# Patient Record
Sex: FEMALE | Race: BLACK OR AFRICAN AMERICAN | NOT HISPANIC OR LATINO
[De-identification: names, ages, dates, MRNs, and addresses within clinical notes are randomized per-mention and may not be internally consistent; named-entity substitution may affect disease eponyms.]

---

## 2017-01-05 ENCOUNTER — APPOINTMENT (OUTPATIENT)
Dept: GASTROENTEROLOGY | Facility: CLINIC | Age: 32
End: 2017-01-05

## 2017-01-05 VITALS
RESPIRATION RATE: 15 BRPM | BODY MASS INDEX: 25.49 KG/M2 | HEART RATE: 77 BPM | TEMPERATURE: 98 F | OXYGEN SATURATION: 98 % | WEIGHT: 135 LBS | HEIGHT: 61 IN | DIASTOLIC BLOOD PRESSURE: 68 MMHG | SYSTOLIC BLOOD PRESSURE: 110 MMHG

## 2017-01-05 DIAGNOSIS — Z80.0 FAMILY HISTORY OF MALIGNANT NEOPLASM OF DIGESTIVE ORGANS: ICD-10-CM

## 2017-01-08 PROBLEM — Z80.0 FAMILY HISTORY OF MALIGNANT NEOPLASM OF STOMACH: Status: ACTIVE | Noted: 2017-01-08

## 2017-02-08 ENCOUNTER — APPOINTMENT (OUTPATIENT)
Dept: GASTROENTEROLOGY | Facility: HOSPITAL | Age: 32
End: 2017-02-08

## 2017-03-09 ENCOUNTER — APPOINTMENT (OUTPATIENT)
Dept: FAMILY MEDICINE | Facility: CLINIC | Age: 32
End: 2017-03-09

## 2017-07-24 ENCOUNTER — APPOINTMENT (OUTPATIENT)
Dept: FAMILY MEDICINE | Facility: CLINIC | Age: 32
End: 2017-07-24

## 2017-09-11 ENCOUNTER — APPOINTMENT (OUTPATIENT)
Dept: ULTRASOUND IMAGING | Facility: HOSPITAL | Age: 32
End: 2017-09-11
Payer: COMMERCIAL

## 2017-09-11 ENCOUNTER — OUTPATIENT (OUTPATIENT)
Dept: OUTPATIENT SERVICES | Facility: HOSPITAL | Age: 32
LOS: 1 days | End: 2017-09-11
Payer: COMMERCIAL

## 2017-09-11 PROCEDURE — 76536 US EXAM OF HEAD AND NECK: CPT

## 2017-09-11 PROCEDURE — 76536 US EXAM OF HEAD AND NECK: CPT | Mod: 26

## 2017-09-11 PROCEDURE — 76700 US EXAM ABDOM COMPLETE: CPT | Mod: 26

## 2017-09-11 PROCEDURE — 76700 US EXAM ABDOM COMPLETE: CPT

## 2017-10-20 ENCOUNTER — EMERGENCY (EMERGENCY)
Facility: HOSPITAL | Age: 32
LOS: 1 days | Discharge: HOME | End: 2017-10-20
Admitting: EMERGENCY MEDICINE
Payer: COMMERCIAL

## 2017-10-20 PROCEDURE — 73562 X-RAY EXAM OF KNEE 3: CPT | Mod: 26,RT

## 2017-10-20 PROCEDURE — 99284 EMERGENCY DEPT VISIT MOD MDM: CPT

## 2017-10-21 PROCEDURE — 96375 TX/PRO/DX INJ NEW DRUG ADDON: CPT

## 2017-10-21 PROCEDURE — 81025 URINE PREGNANCY TEST: CPT

## 2017-10-21 PROCEDURE — 85027 COMPLETE CBC AUTOMATED: CPT

## 2017-10-21 PROCEDURE — 81003 URINALYSIS AUTO W/O SCOPE: CPT

## 2017-10-21 PROCEDURE — 96374 THER/PROPH/DIAG INJ IV PUSH: CPT

## 2017-10-21 PROCEDURE — 80053 COMPREHEN METABOLIC PANEL: CPT

## 2017-10-21 PROCEDURE — 99284 EMERGENCY DEPT VISIT MOD MDM: CPT | Mod: 25

## 2017-10-21 PROCEDURE — 83690 ASSAY OF LIPASE: CPT

## 2017-10-21 PROCEDURE — 73562 X-RAY EXAM OF KNEE 3: CPT

## 2017-11-17 ENCOUNTER — APPOINTMENT (OUTPATIENT)
Dept: FAMILY MEDICINE | Facility: CLINIC | Age: 32
End: 2017-11-17

## 2017-11-18 ENCOUNTER — EMERGENCY (EMERGENCY)
Facility: HOSPITAL | Age: 32
LOS: 1 days | Discharge: HOME | End: 2017-11-18
Admitting: EMERGENCY MEDICINE
Payer: COMMERCIAL

## 2017-11-18 PROCEDURE — 99283 EMERGENCY DEPT VISIT LOW MDM: CPT | Mod: 25

## 2017-11-18 PROCEDURE — 96372 THER/PROPH/DIAG INJ SC/IM: CPT | Mod: XU

## 2017-11-18 PROCEDURE — 56420 I&D BARTHOLINS GLAND ABSCESS: CPT

## 2017-11-18 PROCEDURE — 99284 EMERGENCY DEPT VISIT MOD MDM: CPT | Mod: 25

## 2017-11-18 PROCEDURE — 56420 I&D BARTHOLINS GLAND ABSCESS: CPT | Mod: RT

## 2017-12-20 ENCOUNTER — EMERGENCY (EMERGENCY)
Facility: HOSPITAL | Age: 32
LOS: 1 days | Discharge: HOME | End: 2017-12-20
Attending: EMERGENCY MEDICINE | Admitting: EMERGENCY MEDICINE
Payer: COMMERCIAL

## 2017-12-20 VITALS
DIASTOLIC BLOOD PRESSURE: 77 MMHG | RESPIRATION RATE: 16 BRPM | TEMPERATURE: 100 F | HEART RATE: 78 BPM | SYSTOLIC BLOOD PRESSURE: 113 MMHG | OXYGEN SATURATION: 97 %

## 2017-12-20 VITALS
OXYGEN SATURATION: 99 % | DIASTOLIC BLOOD PRESSURE: 42 MMHG | HEART RATE: 87 BPM | TEMPERATURE: 98 F | SYSTOLIC BLOOD PRESSURE: 126 MMHG | RESPIRATION RATE: 16 BRPM | HEIGHT: 61 IN | WEIGHT: 130.07 LBS

## 2017-12-20 DIAGNOSIS — N75.0 CYST OF BARTHOLIN'S GLAND: Chronic | ICD-10-CM

## 2017-12-20 PROCEDURE — 99283 EMERGENCY DEPT VISIT LOW MDM: CPT | Mod: 25

## 2017-12-20 PROCEDURE — 56420 I&D BARTHOLINS GLAND ABSCESS: CPT

## 2017-12-20 PROCEDURE — 87070 CULTURE OTHR SPECIMN AEROBIC: CPT

## 2017-12-20 PROCEDURE — 87205 SMEAR GRAM STAIN: CPT

## 2017-12-20 RX ORDER — CEPHALEXIN 500 MG
1 CAPSULE ORAL
Qty: 12 | Refills: 0 | OUTPATIENT
Start: 2017-12-20 | End: 2017-12-25

## 2017-12-20 RX ORDER — ACETAMINOPHEN 500 MG
650 TABLET ORAL ONCE
Qty: 0 | Refills: 0 | Status: COMPLETED | OUTPATIENT
Start: 2017-12-20 | End: 2017-12-20

## 2017-12-20 RX ORDER — OXYCODONE HYDROCHLORIDE 5 MG/1
5 TABLET ORAL ONCE
Qty: 0 | Refills: 0 | Status: DISCONTINUED | OUTPATIENT
Start: 2017-12-20 | End: 2017-12-20

## 2017-12-20 RX ORDER — CEPHALEXIN 500 MG
500 CAPSULE ORAL ONCE
Qty: 0 | Refills: 0 | Status: COMPLETED | OUTPATIENT
Start: 2017-12-20 | End: 2017-12-20

## 2017-12-20 RX ADMIN — OXYCODONE HYDROCHLORIDE 5 MILLIGRAM(S): 5 TABLET ORAL at 11:15

## 2017-12-20 RX ADMIN — Medication 650 MILLIGRAM(S): at 11:16

## 2017-12-20 RX ADMIN — Medication 650 MILLIGRAM(S): at 11:45

## 2017-12-20 RX ADMIN — Medication 500 MILLIGRAM(S): at 12:43

## 2017-12-20 RX ADMIN — OXYCODONE HYDROCHLORIDE 5 MILLIGRAM(S): 5 TABLET ORAL at 11:45

## 2017-12-20 NOTE — ED PROVIDER NOTE - PMH
Anxiety    PCOS (polycystic ovarian syndrome) Anxiety    H. pylori infection    PCOS (polycystic ovarian syndrome)

## 2017-12-20 NOTE — ED PROVIDER NOTE - CHPI ED SYMPTOMS NEG
no pain/no abdominal pain/no back pain/no vaginal discharge/no vomiting/no nausea/no fever/no chills/no discharge/no dysuria

## 2017-12-20 NOTE — ED PROVIDER NOTE - PROGRESS NOTE DETAILS
Copious purulent fluid expressed from cyst; pt with near-immediate relief of pain; packing placed, pt recommended to return to ED in 48 hr or see her OB in same time period.  Abx Rx provided.  Strict return precautions provided.  --BMM

## 2017-12-20 NOTE — ED PROVIDER NOTE - MEDICAL DECISION MAKING DETAILS
Patient with Bartholin cyst was drained in ED, no complications. needs to undergo marsupialisation bytaisha LARKIN. discharged on antibiotics. Patient with Bartholin cyst was drained in ED, no complications. needs to undergo outpt marsupialization by OBGYN for definitive mgmt. discharged on antibiotics.  Will return in 48 hr for packing removal/wound check, wound care instructions provided.  --BMM

## 2017-12-20 NOTE — ED ADULT NURSE REASSESSMENT NOTE - NS ED NURSE REASSESS COMMENT FT1
Pt reports minor relief of pain s/p bedside lancing of abscess. Female RN bedside for procedure performed by Julián GREENE.

## 2017-12-20 NOTE — ED PROVIDER NOTE - CHIEF COMPLAINT
The patient is a 32y Female complaining of The patient is a 32y Female complaining of right labial pain

## 2017-12-20 NOTE — ED ADULT NURSE NOTE - OBJECTIVE STATEMENT
33 y/o female A&Ox4, PMH anxiety, PCOS, presents to ED with c/o labial cyst accompanied by burning pain rated 10/10. As per pt, area was lanced last month. Pt had had tge cyst for 8 years but pain only recently began prior to first lacing last month. Pt states she took out packing and fluid reaccumulated immediately. Denies vaginal discharge/bleeding, denies burning with urination. Pt is otherwise alert, ambulatory, and well appearing. No other complaints at this time.

## 2017-12-20 NOTE — ED ADULT NURSE REASSESSMENT NOTE - NS ED NURSE REASSESS COMMENT FT1
Pt received from Timothy RN. Bedside vaginal exam performed by Julián GREENE, female RN bedside as chaperone. Pt pending lacing of abscess

## 2017-12-20 NOTE — ED PROVIDER NOTE - OBJECTIVE STATEMENT
32 y F h/o gastroesophageal reflux disease on triple therapy, seen 1 month ago at outside ED for R bartholin gland cyst 32 y F h/o gastroesophageal reflux disease on triple therapy, seen 1 month ago at outside ED for R bartholin gland cyst.  She has had a Bartholin cyst for the past few years, but never experienced pain/cyst infections 32 y F h/o gastroesophageal reflux disease on triple therapy, seen 1 month ago at outside ED for R bartholin gland cyst, s/p I&D at that time.  Was seen by outpt OB who recommended marsupialization.  Subjective f/c but has not taken her temperature.  No dysuria but pain with sitting.  No rectal pain.  No vaginal d/c.

## 2017-12-25 LAB
CULTURE RESULTS: SIGNIFICANT CHANGE UP
SPECIMEN SOURCE: SIGNIFICANT CHANGE UP

## 2018-01-16 ENCOUNTER — EMERGENCY (EMERGENCY)
Facility: HOSPITAL | Age: 33
LOS: 1 days | Discharge: HOME | End: 2018-01-16
Admitting: EMERGENCY MEDICINE
Payer: COMMERCIAL

## 2018-01-16 DIAGNOSIS — N75.0 CYST OF BARTHOLIN'S GLAND: Chronic | ICD-10-CM

## 2018-01-16 PROCEDURE — 99284 EMERGENCY DEPT VISIT MOD MDM: CPT | Mod: 25

## 2018-01-16 PROCEDURE — 81025 URINE PREGNANCY TEST: CPT

## 2018-01-16 PROCEDURE — 99284 EMERGENCY DEPT VISIT MOD MDM: CPT

## 2018-01-16 PROCEDURE — 87205 SMEAR GRAM STAIN: CPT

## 2018-01-16 PROCEDURE — 56420 I&D BARTHOLINS GLAND ABSCESS: CPT

## 2018-01-16 PROCEDURE — 56405 I&D VULVA/PERINEAL ABSCESS: CPT

## 2018-01-16 PROCEDURE — 87070 CULTURE OTHR SPECIMN AEROBIC: CPT

## 2018-03-18 ENCOUNTER — INPATIENT (INPATIENT)
Facility: HOSPITAL | Age: 33
LOS: 0 days | Discharge: HOME | DRG: 747 | End: 2018-03-19
Attending: OBSTETRICS & GYNECOLOGY | Admitting: OBSTETRICS & GYNECOLOGY
Payer: COMMERCIAL

## 2018-03-18 DIAGNOSIS — F17.210 NICOTINE DEPENDENCE, CIGARETTES, UNCOMPLICATED: ICD-10-CM

## 2018-03-18 DIAGNOSIS — N75.0 CYST OF BARTHOLIN'S GLAND: Chronic | ICD-10-CM

## 2018-03-18 DIAGNOSIS — N75.1 ABSCESS OF BARTHOLIN'S GLAND: ICD-10-CM

## 2018-03-18 DIAGNOSIS — Z91.040 LATEX ALLERGY STATUS: ICD-10-CM

## 2018-03-18 PROCEDURE — 56440 MRSPLZATN BRTHLNS GLND CST: CPT

## 2018-03-19 PROCEDURE — 96375 TX/PRO/DX INJ NEW DRUG ADDON: CPT

## 2018-03-19 PROCEDURE — C1889: CPT

## 2018-03-19 PROCEDURE — 99285 EMERGENCY DEPT VISIT HI MDM: CPT | Mod: 25

## 2018-03-19 PROCEDURE — 87075 CULTR BACTERIA EXCEPT BLOOD: CPT

## 2018-03-19 PROCEDURE — 87205 SMEAR GRAM STAIN: CPT

## 2018-03-19 PROCEDURE — 87070 CULTURE OTHR SPECIMN AEROBIC: CPT

## 2018-03-19 PROCEDURE — 85027 COMPLETE CBC AUTOMATED: CPT

## 2018-03-19 PROCEDURE — 85730 THROMBOPLASTIN TIME PARTIAL: CPT

## 2018-03-19 PROCEDURE — 85610 PROTHROMBIN TIME: CPT

## 2018-03-19 PROCEDURE — 96361 HYDRATE IV INFUSION ADD-ON: CPT

## 2018-03-19 PROCEDURE — 96365 THER/PROPH/DIAG IV INF INIT: CPT

## 2018-03-19 PROCEDURE — 80048 BASIC METABOLIC PNL TOTAL CA: CPT

## 2018-03-19 PROCEDURE — 86850 RBC ANTIBODY SCREEN: CPT

## 2018-03-19 PROCEDURE — 84702 CHORIONIC GONADOTROPIN TEST: CPT

## 2018-03-19 PROCEDURE — 86900 BLOOD TYPING SEROLOGIC ABO: CPT

## 2018-11-16 ENCOUNTER — EMERGENCY (EMERGENCY)
Facility: HOSPITAL | Age: 33
LOS: 1 days | Discharge: HOME | End: 2018-11-16
Attending: INTERNAL MEDICINE | Admitting: INTERNAL MEDICINE
Payer: COMMERCIAL

## 2018-11-16 VITALS
RESPIRATION RATE: 18 BRPM | DIASTOLIC BLOOD PRESSURE: 77 MMHG | HEART RATE: 84 BPM | TEMPERATURE: 99 F | OXYGEN SATURATION: 98 % | HEIGHT: 61 IN | SYSTOLIC BLOOD PRESSURE: 135 MMHG | WEIGHT: 158.73 LBS

## 2018-11-16 DIAGNOSIS — N75.0 CYST OF BARTHOLIN'S GLAND: Chronic | ICD-10-CM

## 2018-11-16 PROBLEM — A04.8 OTHER SPECIFIED BACTERIAL INTESTINAL INFECTIONS: Chronic | Status: ACTIVE | Noted: 2017-12-20

## 2018-11-16 PROCEDURE — 81025 URINE PREGNANCY TEST: CPT

## 2018-11-16 PROCEDURE — 99284 EMERGENCY DEPT VISIT MOD MDM: CPT | Mod: 25

## 2018-11-16 PROCEDURE — 72100 X-RAY EXAM L-S SPINE 2/3 VWS: CPT

## 2018-11-16 PROCEDURE — 72100 X-RAY EXAM L-S SPINE 2/3 VWS: CPT | Mod: 26

## 2018-11-16 PROCEDURE — 70450 CT HEAD/BRAIN W/O DYE: CPT | Mod: 26

## 2018-11-16 PROCEDURE — 99284 EMERGENCY DEPT VISIT MOD MDM: CPT

## 2018-11-16 PROCEDURE — 70450 CT HEAD/BRAIN W/O DYE: CPT

## 2018-11-16 RX ORDER — LIDOCAINE 4 G/100G
1 CREAM TOPICAL
Qty: 5 | Refills: 0
Start: 2018-11-16 | End: 2018-11-20

## 2018-11-16 RX ORDER — OXYCODONE AND ACETAMINOPHEN 5; 325 MG/1; MG/1
1 TABLET ORAL ONCE
Qty: 0 | Refills: 0 | Status: DISCONTINUED | OUTPATIENT
Start: 2018-11-16 | End: 2018-11-16

## 2018-11-16 RX ORDER — LIDOCAINE 4 G/100G
1 CREAM TOPICAL ONCE
Qty: 0 | Refills: 0 | Status: COMPLETED | OUTPATIENT
Start: 2018-11-16 | End: 2018-11-16

## 2018-11-16 RX ORDER — LIDOCAINE 4 G/100G
1 CREAM TOPICAL
Qty: 5 | Refills: 0 | OUTPATIENT
Start: 2018-11-16 | End: 2018-11-20

## 2018-11-16 RX ADMIN — OXYCODONE AND ACETAMINOPHEN 1 TABLET(S): 5; 325 TABLET ORAL at 13:05

## 2018-11-16 RX ADMIN — LIDOCAINE 1 PATCH: 4 CREAM TOPICAL at 13:04

## 2018-11-16 NOTE — ED PROVIDER NOTE - PROGRESS NOTE DETAILS
xray and ct reviewed. Pt feeling better. Pt ambulating. Pt neurovascular intact. Pt stable for discharge.

## 2018-11-16 NOTE — ED PROVIDER NOTE - PHYSICAL EXAMINATION
pt ambulating   spine- tenderness to lumbar spine and right sided paraspinal lumbar spine   cervical- no bony tenderness   head- positive hematoma to left sided of forehead
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
## 2018-11-16 NOTE — ED ADULT NURSE NOTE - CHPI ED NUR SYMPTOMS NEG
no seizure/no weakness/no chest wall tenderness/no abrasion/no chest pain/no loss of consciousness/no blurred vision
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
## 2018-11-16 NOTE — ED ADULT NURSE NOTE - NSIMPLEMENTINTERV_GEN_ALL_ED
Implemented All Universal Safety Interventions:  [place] to call system. Call bell, personal items and telephone within reach. Instruct patient to call for assistance. Room bathroom lighting operational. Non-slip footwear when patient is off stretcher. Physically safe environment: no spills, clutter or unnecessary equipment. Stretcher in lowest position, wheels locked, appropriate side rails in place.

## 2018-12-11 NOTE — ED ADULT NURSE NOTE - PATIENT PROVIDED WITH THE SEVEN POINTS OF INFORMATION ABOUT HIV REQUIRED BY THE PUBLIC HEALTH LAW
Statement Selected Island Pedicle Flap-Requiring Vessel Identification Text: The defect edges were debeveled with a #15 scalpel blade.  Given the location of the defect, shape of the defect and the proximity to free margins an island pedicle advancement flap was deemed most appropriate.  Using a sterile surgical marker, an appropriate advancement flap was drawn, based on the axial vessel mentioned above, incorporating the defect, outlining the appropriate donor tissue and placing the expected incisions within the relaxed skin tension lines where possible.    The area thus outlined was incised deep to adipose tissue with a #15 scalpel blade.  The skin margins were undermined to an appropriate distance in all directions around the primary defect and laterally outward around the island pedicle utilizing iris scissors.  There was minimal undermining beneath the pedicle flap.

## 2019-04-12 ENCOUNTER — EMERGENCY (EMERGENCY)
Facility: HOSPITAL | Age: 34
LOS: 1 days | Discharge: HOME | End: 2019-04-12
Attending: EMERGENCY MEDICINE
Payer: COMMERCIAL

## 2019-04-12 VITALS
OXYGEN SATURATION: 99 % | RESPIRATION RATE: 20 BRPM | TEMPERATURE: 98 F | HEART RATE: 82 BPM | DIASTOLIC BLOOD PRESSURE: 66 MMHG | SYSTOLIC BLOOD PRESSURE: 114 MMHG

## 2019-04-12 VITALS
WEIGHT: 149.91 LBS | SYSTOLIC BLOOD PRESSURE: 112 MMHG | TEMPERATURE: 98 F | RESPIRATION RATE: 18 BRPM | HEIGHT: 61 IN | OXYGEN SATURATION: 96 % | DIASTOLIC BLOOD PRESSURE: 64 MMHG | HEART RATE: 88 BPM

## 2019-04-12 DIAGNOSIS — N75.0 CYST OF BARTHOLIN'S GLAND: Chronic | ICD-10-CM

## 2019-04-12 LAB
ALBUMIN SERPL ELPH-MCNC: 4.4 G/DL — SIGNIFICANT CHANGE UP (ref 3.3–5)
ALP SERPL-CCNC: 63 U/L — SIGNIFICANT CHANGE UP (ref 40–120)
ALT FLD-CCNC: 18 U/L — SIGNIFICANT CHANGE UP (ref 10–45)
ANION GAP SERPL CALC-SCNC: 10 MMOL/L — SIGNIFICANT CHANGE UP (ref 5–17)
AST SERPL-CCNC: 18 U/L — SIGNIFICANT CHANGE UP (ref 10–40)
BILIRUB SERPL-MCNC: 0.2 MG/DL — SIGNIFICANT CHANGE UP (ref 0.2–1.2)
BUN SERPL-MCNC: 19 MG/DL — SIGNIFICANT CHANGE UP (ref 7–23)
CALCIUM SERPL-MCNC: 9.8 MG/DL — SIGNIFICANT CHANGE UP (ref 8.4–10.5)
CHLORIDE SERPL-SCNC: 105 MMOL/L — SIGNIFICANT CHANGE UP (ref 96–108)
CO2 SERPL-SCNC: 25 MMOL/L — SIGNIFICANT CHANGE UP (ref 22–31)
CREAT SERPL-MCNC: 0.9 MG/DL — SIGNIFICANT CHANGE UP (ref 0.5–1.3)
D DIMER BLD IA.RAPID-MCNC: <150 NG/ML DDU — SIGNIFICANT CHANGE UP
GLUCOSE SERPL-MCNC: 85 MG/DL — SIGNIFICANT CHANGE UP (ref 70–99)
HCT VFR BLD CALC: 36.4 % — SIGNIFICANT CHANGE UP (ref 34.5–45)
HGB BLD-MCNC: 12.3 G/DL — SIGNIFICANT CHANGE UP (ref 11.5–15.5)
MCHC RBC-ENTMCNC: 31.3 PG — SIGNIFICANT CHANGE UP (ref 27–34)
MCHC RBC-ENTMCNC: 33.8 GM/DL — SIGNIFICANT CHANGE UP (ref 32–36)
MCV RBC AUTO: 92.6 FL — SIGNIFICANT CHANGE UP (ref 80–100)
PLATELET # BLD AUTO: 263 K/UL — SIGNIFICANT CHANGE UP (ref 150–400)
POTASSIUM SERPL-MCNC: 4.3 MMOL/L — SIGNIFICANT CHANGE UP (ref 3.5–5.3)
POTASSIUM SERPL-SCNC: 4.3 MMOL/L — SIGNIFICANT CHANGE UP (ref 3.5–5.3)
PROT SERPL-MCNC: 7.4 G/DL — SIGNIFICANT CHANGE UP (ref 6–8.3)
RBC # BLD: 3.93 M/UL — SIGNIFICANT CHANGE UP (ref 3.8–5.2)
RBC # FLD: 12.5 % — SIGNIFICANT CHANGE UP (ref 10.3–14.5)
SODIUM SERPL-SCNC: 140 MMOL/L — SIGNIFICANT CHANGE UP (ref 135–145)
TROPONIN T, HIGH SENSITIVITY RESULT: <6 NG/L — SIGNIFICANT CHANGE UP (ref 0–51)
WBC # BLD: 10 K/UL — SIGNIFICANT CHANGE UP (ref 3.8–10.5)
WBC # FLD AUTO: 10 K/UL — SIGNIFICANT CHANGE UP (ref 3.8–10.5)

## 2019-04-12 PROCEDURE — 99285 EMERGENCY DEPT VISIT HI MDM: CPT | Mod: 25

## 2019-04-12 PROCEDURE — 85379 FIBRIN DEGRADATION QUANT: CPT

## 2019-04-12 PROCEDURE — 96374 THER/PROPH/DIAG INJ IV PUSH: CPT

## 2019-04-12 PROCEDURE — 93005 ELECTROCARDIOGRAM TRACING: CPT

## 2019-04-12 PROCEDURE — 80053 COMPREHEN METABOLIC PANEL: CPT

## 2019-04-12 PROCEDURE — 93010 ELECTROCARDIOGRAM REPORT: CPT

## 2019-04-12 PROCEDURE — 71046 X-RAY EXAM CHEST 2 VIEWS: CPT

## 2019-04-12 PROCEDURE — 84484 ASSAY OF TROPONIN QUANT: CPT

## 2019-04-12 PROCEDURE — 99284 EMERGENCY DEPT VISIT MOD MDM: CPT | Mod: 25

## 2019-04-12 PROCEDURE — 71046 X-RAY EXAM CHEST 2 VIEWS: CPT | Mod: 26

## 2019-04-12 PROCEDURE — 85027 COMPLETE CBC AUTOMATED: CPT

## 2019-04-12 RX ORDER — KETOROLAC TROMETHAMINE 30 MG/ML
30 SYRINGE (ML) INJECTION ONCE
Qty: 0 | Refills: 0 | Status: DISCONTINUED | OUTPATIENT
Start: 2019-04-12 | End: 2019-04-12

## 2019-04-12 RX ORDER — IBUPROFEN 200 MG
600 TABLET ORAL ONCE
Qty: 0 | Refills: 0 | Status: DISCONTINUED | OUTPATIENT
Start: 2019-04-12 | End: 2019-04-12

## 2019-04-12 RX ADMIN — Medication 30 MILLIGRAM(S): at 15:25

## 2019-04-12 NOTE — ED PROVIDER NOTE - OBJECTIVE STATEMENT
33F no pmh p/w 2 days of left sided chest pain and left back pain.  Pain is waxing and waning, dull to sharp not associated with shortness of breath, nausea/vomiting.  Pt is under increased stress at home and work.  Pt was sitting when pain started.  Pain is exacerbated by movement of the left arm and changing position.  Pt began coughing today, nonproductive.  No f/c, abd pain, nausea/vomiting/diarrhea.  No recent travel, no leg swelling.

## 2019-04-12 NOTE — ED PROVIDER NOTE - PROGRESS NOTE DETAILS
KRISH Davalos: Patient was endorsed to me by Dr. Sequeira at the usual hour of signout to follow up results of labs and dispo pending these results and re-evaluation. At this time the patient feeling ok. d dimer and trop neg. ekg was reportedly benign. personal medical doctor f/u. precautions Pt feeling better after toradol.  D-dimer and trop negative.  Pt will be d/c'd with pmd edgar noguera.  - Courtney Suazo DO

## 2019-04-12 NOTE — ED PROVIDER NOTE - NSFOLLOWUPINSTRUCTIONS_ED_ALL_ED_FT
- Take Motrin 400-600mg every 6 hrs as needed for pain. Take with food   - Take Tylenol 650mg every 6 hrs as needed for pain.   - Please follow up with your Primary doctor in 2-3 days.  Your results have been provided to share with your doctor.  - Please return with any new or worsening symptoms

## 2019-04-12 NOTE — ED PROVIDER NOTE - PHYSICAL EXAMINATION
Gen: NAD, AOx3  Head: NCAT  HEENT: PERRL, oral mucosa moist, normal conjunctiva  Lung: CTAB, no respiratory distress  CV: rrr, no murmurs, Normal perfusion  Abd: soft, NTND, no CVA tenderness  MSK: No edema, no visible deformities  Neuro: No focal neurologic deficits  Skin: No rash   Psych: normal affect Gen: NAD, AOx3  Head: NCAT  HEENT: PERRL, oral mucosa moist, normal conjunctiva  Lung: CTAB, no respiratory distress  CV: rrr, no murmurs, Normal perfusion.  Reproducible ttp over left anterior chest  Abd: soft, NTND, no CVA tenderness  MSK: No edema, no visible deformities  Neuro: No focal neurologic deficits  Skin: No rash   Psych: normal affect
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
## 2019-04-12 NOTE — ED ADULT NURSE NOTE - OBJECTIVE STATEMENT
Patient presents to Ed with c/o chest pain. Patient reports developing sharp left sided chest pain radiating to her   back since yesterday and has been constant. Patient denies sob cough fever chills headache dizziness nausea or   vomiting. LAC 20g iv lock placed, labs drawn and sent.

## 2019-04-12 NOTE — ED PROVIDER NOTE - CLINICAL SUMMARY MEDICAL DECISION MAKING FREE TEXT BOX
Dr. Sequeira Note: chest pain atypical 2 weeks, low risk for CAD/PE, check with biomarkers, normal EKG.

## 2019-05-17 ENCOUNTER — EMERGENCY (EMERGENCY)
Facility: HOSPITAL | Age: 34
LOS: 1 days | Discharge: HOME | End: 2019-05-17
Attending: EMERGENCY MEDICINE | Admitting: EMERGENCY MEDICINE
Payer: COMMERCIAL

## 2019-05-17 VITALS
RESPIRATION RATE: 18 BRPM | OXYGEN SATURATION: 99 % | HEART RATE: 81 BPM | DIASTOLIC BLOOD PRESSURE: 74 MMHG | SYSTOLIC BLOOD PRESSURE: 131 MMHG

## 2019-05-17 VITALS
HEIGHT: 62 IN | OXYGEN SATURATION: 97 % | TEMPERATURE: 98 F | DIASTOLIC BLOOD PRESSURE: 73 MMHG | WEIGHT: 145.95 LBS | RESPIRATION RATE: 18 BRPM | SYSTOLIC BLOOD PRESSURE: 133 MMHG | HEART RATE: 96 BPM

## 2019-05-17 DIAGNOSIS — N75.0 CYST OF BARTHOLIN'S GLAND: Chronic | ICD-10-CM

## 2019-05-17 DIAGNOSIS — M79.659 PAIN IN UNSPECIFIED THIGH: ICD-10-CM

## 2019-05-17 PROCEDURE — 99285 EMERGENCY DEPT VISIT HI MDM: CPT | Mod: 25

## 2019-05-17 PROCEDURE — 99283 EMERGENCY DEPT VISIT LOW MDM: CPT | Mod: 25

## 2019-05-17 PROCEDURE — 16020 DRESS/DEBRID P-THICK BURN S: CPT

## 2019-05-17 PROCEDURE — 16000 INITIAL TREATMENT OF BURN(S): CPT | Mod: XU

## 2019-05-17 PROCEDURE — 16000 INITIAL TREATMENT OF BURN(S): CPT | Mod: 59

## 2019-05-17 RX ORDER — OXYCODONE AND ACETAMINOPHEN 5; 325 MG/1; MG/1
1 TABLET ORAL ONCE
Refills: 0 | Status: DISCONTINUED | OUTPATIENT
Start: 2019-05-17 | End: 2019-05-17

## 2019-05-17 RX ORDER — IBUPROFEN 200 MG
1 TABLET ORAL
Qty: 28 | Refills: 0
Start: 2019-05-17 | End: 2019-05-23

## 2019-05-17 RX ADMIN — OXYCODONE AND ACETAMINOPHEN 1 TABLET(S): 5; 325 TABLET ORAL at 15:28

## 2019-05-17 RX ADMIN — Medication 1 APPLICATION(S): at 15:44

## 2019-05-17 RX ADMIN — OXYCODONE AND ACETAMINOPHEN 1 TABLET(S): 5; 325 TABLET ORAL at 15:58
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
## 2020-01-06 ENCOUNTER — RESULT CHARGE (OUTPATIENT)
Age: 35
End: 2020-01-06

## 2020-01-07 ENCOUNTER — APPOINTMENT (OUTPATIENT)
Dept: FAMILY MEDICINE | Facility: HOSPITAL | Age: 35
End: 2020-01-07
Payer: MEDICAID

## 2020-01-07 ENCOUNTER — OUTPATIENT (OUTPATIENT)
Dept: OUTPATIENT SERVICES | Facility: HOSPITAL | Age: 35
LOS: 1 days | End: 2020-01-07
Payer: MEDICAID

## 2020-01-07 VITALS
HEIGHT: 61 IN | DIASTOLIC BLOOD PRESSURE: 78 MMHG | BODY MASS INDEX: 31.34 KG/M2 | HEART RATE: 67 BPM | WEIGHT: 166 LBS | RESPIRATION RATE: 16 BRPM | OXYGEN SATURATION: 95 % | SYSTOLIC BLOOD PRESSURE: 120 MMHG | TEMPERATURE: 98.3 F

## 2020-01-07 DIAGNOSIS — Z86.59 PERSONAL HISTORY OF OTHER MENTAL AND BEHAVIORAL DISORDERS: ICD-10-CM

## 2020-01-07 DIAGNOSIS — Z00.00 ENCOUNTER FOR GENERAL ADULT MEDICAL EXAMINATION WITHOUT ABNORMAL FINDINGS: ICD-10-CM

## 2020-01-07 DIAGNOSIS — Z78.9 OTHER SPECIFIED HEALTH STATUS: ICD-10-CM

## 2020-01-07 DIAGNOSIS — Z81.8 FAMILY HISTORY OF OTHER MENTAL AND BEHAVIORAL DISORDERS: ICD-10-CM

## 2020-01-07 DIAGNOSIS — N75.0 CYST OF BARTHOLIN'S GLAND: Chronic | ICD-10-CM

## 2020-01-07 DIAGNOSIS — Z87.19 PERSONAL HISTORY OF OTHER DISEASES OF THE DIGESTIVE SYSTEM: ICD-10-CM

## 2020-01-07 PROCEDURE — 93010 ELECTROCARDIOGRAM REPORT: CPT

## 2020-01-07 PROCEDURE — 93005 ELECTROCARDIOGRAM TRACING: CPT

## 2020-01-07 PROCEDURE — G0463: CPT

## 2020-01-09 NOTE — COUNSELING
[Risk of tobacco use and health benefits of smoking cessation discussed] : Risk of tobacco use and health benefits of smoking cessation discussed [Cessation strategies including cessation program discussed] : Cessation strategies including cessation program discussed [Use of nicotine replacement therapies and other medications discussed] : Use of nicotine replacement therapies and other medications discussed [Encouraged to pick a quit date and identify support needed to quit] : Encouraged to pick a quit date and identify support needed to quit [Strategies to reduce or eliminate alcohol use discussed] : Strategies to reduce or eliminate alcohol use discussed [Potential consequences of obesity discussed] : Potential consequences of obesity discussed [Benefits of weight loss discussed] : Benefits of weight loss discussed [Encouraged to maintain food diary] : Encouraged to maintain food diary [Encouraged to increase physical activity] : Encouraged to increase physical activity [Encouraged to use exercise tracking device] : Encouraged to use exercise tracking device [Weigh Self Weekly] : weigh self weekly [Decrease Portions] : decrease portions [____ min/wk Activity] : [unfilled] min/wk activity [Keep Food Diary] : keep food diary [Needs reinforcement, provided] : Patient needs reinforcement on understanding of disease, goals and obesity follow-up plan; reinforcement was provided [Inadequate social support] : Inadequate social support [Financial issues] : Financial issues [Willing to Quit Smoking] : Not willing to quit smoking
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
## 2020-01-09 NOTE — REVIEW OF SYSTEMS
[Nasal Discharge] : nasal discharge [Postnasal Drip] : postnasal drip [Fever] : no fever [Chills] : no chills [Pain] : no pain [Fatigue] : no fatigue [Discharge] : no discharge [Hearing Loss] : no hearing loss [Redness] : no redness [Earache] : no earache [Sore Throat] : no sore throat [Nosebleed] : no nosebleeds [Hoarseness] : no hoarseness [Constipation] : no constipation [Nausea] : no nausea [Abdominal Pain] : no abdominal pain [Diarrhea] : diarrhea [Dysuria] : no dysuria [Incontinence] : no incontinence [Headache] : no headache [Nocturia] : no nocturia [Suicidal] : not suicidal [Frequency] : no frequency

## 2020-01-09 NOTE — PHYSICAL EXAM
[No Acute Distress] : no acute distress [Well Nourished] : well nourished [Well Developed] : well developed [Well-Appearing] : well-appearing [Normal Sclera/Conjunctiva] : normal sclera/conjunctiva [PERRL] : pupils equal round and reactive to light [EOMI] : extraocular movements intact [Normal Outer Ear/Nose] : the outer ears and nose were normal in appearance [Normal Oropharynx] : the oropharynx was normal [Normal TMs] : both tympanic membranes were normal [No Lymphadenopathy] : no lymphadenopathy [Supple] : supple [No Respiratory Distress] : no respiratory distress  [No Accessory Muscle Use] : no accessory muscle use [Clear to Auscultation] : lungs were clear to auscultation bilaterally [Normal Rate] : normal rate  [Regular Rhythm] : with a regular rhythm [Normal S1, S2] : normal S1 and S2 [Soft] : abdomen soft [Non Tender] : non-tender [Non-distended] : non-distended [No Masses] : no abdominal mass palpated [Normal Bowel Sounds] : normal bowel sounds [Pedal Pulses Present] : the pedal pulses are present [Normal Posterior Cervical Nodes] : no posterior cervical lymphadenopathy [Normal Anterior Cervical Nodes] : no anterior cervical lymphadenopathy [No CVA Tenderness] : no CVA  tenderness [No Joint Swelling] : no joint swelling [Coordination Grossly Intact] : coordination grossly intact [No Focal Deficits] : no focal deficits [Normal Gait] : normal gait [Normal Affect] : the affect was normal [Normal Insight/Judgement] : insight and judgment were intact [de-identified] : BL nasal turbinates with granulation tissue and surrounding erythema

## 2020-01-13 DIAGNOSIS — R07.89 OTHER CHEST PAIN: ICD-10-CM

## 2020-01-13 DIAGNOSIS — F32.9 MAJOR DEPRESSIVE DISORDER, SINGLE EPISODE, UNSPECIFIED: ICD-10-CM

## 2020-01-13 DIAGNOSIS — Z76.89 PERSONS ENCOUNTERING HEALTH SERVICES IN OTHER SPECIFIED CIRCUMSTANCES: ICD-10-CM

## 2020-01-13 DIAGNOSIS — Z29.9 ENCOUNTER FOR PROPHYLACTIC MEASURES, UNSPECIFIED: ICD-10-CM

## 2020-01-22 ENCOUNTER — OUTPATIENT (OUTPATIENT)
Dept: OUTPATIENT SERVICES | Facility: HOSPITAL | Age: 35
LOS: 1 days | End: 2020-01-22
Payer: MEDICAID

## 2020-01-22 ENCOUNTER — APPOINTMENT (OUTPATIENT)
Dept: FAMILY MEDICINE | Facility: HOSPITAL | Age: 35
End: 2020-01-22

## 2020-01-22 VITALS
DIASTOLIC BLOOD PRESSURE: 85 MMHG | SYSTOLIC BLOOD PRESSURE: 123 MMHG | HEART RATE: 71 BPM | BODY MASS INDEX: 31.37 KG/M2 | RESPIRATION RATE: 16 BRPM | OXYGEN SATURATION: 97 % | WEIGHT: 166 LBS | TEMPERATURE: 97.7 F

## 2020-01-22 DIAGNOSIS — Z00.00 ENCOUNTER FOR GENERAL ADULT MEDICAL EXAMINATION WITHOUT ABNORMAL FINDINGS: ICD-10-CM

## 2020-01-22 DIAGNOSIS — N75.0 CYST OF BARTHOLIN'S GLAND: Chronic | ICD-10-CM

## 2020-01-22 DIAGNOSIS — Z87.898 PERSONAL HISTORY OF OTHER SPECIFIED CONDITIONS: ICD-10-CM

## 2020-01-22 PROCEDURE — G0463: CPT

## 2020-01-22 PROCEDURE — 84443 ASSAY THYROID STIM HORMONE: CPT

## 2020-01-22 PROCEDURE — 86376 MICROSOMAL ANTIBODY EACH: CPT

## 2020-01-24 DIAGNOSIS — J06.9 ACUTE UPPER RESPIRATORY INFECTION, UNSPECIFIED: ICD-10-CM

## 2020-01-24 DIAGNOSIS — E04.9 NONTOXIC GOITER, UNSPECIFIED: ICD-10-CM

## 2020-01-27 NOTE — PHYSICAL EXAM
[No Acute Distress] : no acute distress [Well Nourished] : well nourished [Well Developed] : well developed [Well-Appearing] : well-appearing [Supple] : supple [No Respiratory Distress] : no respiratory distress  [Normal Rate] : normal rate  [No Accessory Muscle Use] : no accessory muscle use [Clear to Auscultation] : lungs were clear to auscultation bilaterally [Normal S1, S2] : normal S1 and S2 [Regular Rhythm] : with a regular rhythm [Normal Bowel Sounds] : normal bowel sounds [Normal Affect] : the affect was normal [Normal Gait] : normal gait [Normal Insight/Judgement] : insight and judgment were intact [No Lymphadenopathy] : no lymphadenopathy [Soft] : abdomen soft [Non-distended] : non-distended [Normal] : no posterior cervical lymphadenopathy and no anterior cervical lymphadenopathy [Non Tender] : non-tender [de-identified] : Erythema, scarring, and scabbing of the nares b/l. No holes. Oropharynx erythematous [de-identified] : Positive goiter, nontender

## 2020-01-27 NOTE — PHYSICAL EXAM
[Well Nourished] : well nourished [No Acute Distress] : no acute distress [Well-Appearing] : well-appearing [Well Developed] : well developed [Supple] : supple [No Respiratory Distress] : no respiratory distress  [No Accessory Muscle Use] : no accessory muscle use [Normal Rate] : normal rate  [Clear to Auscultation] : lungs were clear to auscultation bilaterally [Normal S1, S2] : normal S1 and S2 [Regular Rhythm] : with a regular rhythm [Normal Affect] : the affect was normal [Normal Gait] : normal gait [Normal Bowel Sounds] : normal bowel sounds [Normal Insight/Judgement] : insight and judgment were intact [No Lymphadenopathy] : no lymphadenopathy [Soft] : abdomen soft [Non-distended] : non-distended [Non Tender] : non-tender [Normal] : no posterior cervical lymphadenopathy and no anterior cervical lymphadenopathy [de-identified] : Erythema, scarring, and scabbing of the nares b/l. No holes. Oropharynx erythematous [de-identified] : Positive goiter, nontender
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
## 2020-01-27 NOTE — REVIEW OF SYSTEMS
[Negative] : Heme/Lymph [Fatigue] : fatigue [Recent Change In Weight] : ~T recent weight change [Chest Pain] : chest pain [Cough] : cough [Nausea] : nausea [Fever] : no fever [Chills] : no chills [Nasal Discharge] : no nasal discharge [Sore Throat] : no sore throat [Palpitations] : no palpitations [Shortness Of Breath] : no shortness of breath [Abdominal Pain] : no abdominal pain

## 2020-01-27 NOTE — REVIEW OF SYSTEMS
[Negative] : Psychiatric [Fatigue] : fatigue [Recent Change In Weight] : ~T recent weight change [Chest Pain] : chest pain [Cough] : cough [Nausea] : nausea [Fever] : no fever [Chills] : no chills [Nasal Discharge] : no nasal discharge [Sore Throat] : no sore throat [Palpitations] : no palpitations [Shortness Of Breath] : no shortness of breath [Abdominal Pain] : no abdominal pain
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
## 2020-01-28 LAB — TSH SERPL-ACNC: 1.74 UIU/ML

## 2020-01-29 LAB — THYROPEROXIDASE AB SERPL IA-ACNC: <10 IU/ML

## 2020-07-24 ENCOUNTER — APPOINTMENT (OUTPATIENT)
Dept: FAMILY MEDICINE | Facility: HOSPITAL | Age: 35
End: 2020-07-24

## 2020-07-31 ENCOUNTER — TRANSCRIPTION ENCOUNTER (OUTPATIENT)
Age: 35
End: 2020-07-31

## 2020-09-09 ENCOUNTER — APPOINTMENT (OUTPATIENT)
Dept: FAMILY MEDICINE | Facility: HOSPITAL | Age: 35
End: 2020-09-09

## 2020-12-01 ENCOUNTER — EMERGENCY (EMERGENCY)
Facility: HOSPITAL | Age: 35
LOS: 1 days | Discharge: HOME | End: 2020-12-01
Attending: EMERGENCY MEDICINE | Admitting: EMERGENCY MEDICINE
Payer: MEDICAID

## 2020-12-01 VITALS
TEMPERATURE: 98 F | RESPIRATION RATE: 18 BRPM | HEART RATE: 105 BPM | SYSTOLIC BLOOD PRESSURE: 126 MMHG | DIASTOLIC BLOOD PRESSURE: 72 MMHG | OXYGEN SATURATION: 98 %

## 2020-12-01 VITALS
SYSTOLIC BLOOD PRESSURE: 121 MMHG | OXYGEN SATURATION: 98 % | TEMPERATURE: 98 F | DIASTOLIC BLOOD PRESSURE: 72 MMHG | HEIGHT: 62 IN | RESPIRATION RATE: 18 BRPM | WEIGHT: 175.05 LBS | HEART RATE: 122 BPM

## 2020-12-01 DIAGNOSIS — N75.0 CYST OF BARTHOLIN'S GLAND: Chronic | ICD-10-CM

## 2020-12-01 PROCEDURE — 99284 EMERGENCY DEPT VISIT MOD MDM: CPT | Mod: 25

## 2020-12-01 PROCEDURE — 96372 THER/PROPH/DIAG INJ SC/IM: CPT

## 2020-12-01 PROCEDURE — 73562 X-RAY EXAM OF KNEE 3: CPT

## 2020-12-01 PROCEDURE — 73562 X-RAY EXAM OF KNEE 3: CPT | Mod: 26,RT

## 2020-12-01 PROCEDURE — 99284 EMERGENCY DEPT VISIT MOD MDM: CPT

## 2020-12-01 PROCEDURE — 90715 TDAP VACCINE 7 YRS/> IM: CPT

## 2020-12-01 RX ORDER — TETANUS TOXOID, REDUCED DIPHTHERIA TOXOID AND ACELLULAR PERTUSSIS VACCINE, ADSORBED 5; 2.5; 8; 8; 2.5 [IU]/.5ML; [IU]/.5ML; UG/.5ML; UG/.5ML; UG/.5ML
0.5 SUSPENSION INTRAMUSCULAR ONCE
Refills: 0 | Status: COMPLETED | OUTPATIENT
Start: 2020-12-01 | End: 2020-12-01

## 2020-12-01 RX ORDER — IBUPROFEN 200 MG
800 TABLET ORAL ONCE
Refills: 0 | Status: COMPLETED | OUTPATIENT
Start: 2020-12-01 | End: 2020-12-01

## 2020-12-01 RX ORDER — OXYCODONE AND ACETAMINOPHEN 5; 325 MG/1; MG/1
1 TABLET ORAL ONCE
Refills: 0 | Status: DISCONTINUED | OUTPATIENT
Start: 2020-12-01 | End: 2020-12-01

## 2020-12-01 RX ORDER — OXYCODONE HYDROCHLORIDE 5 MG/1
1 TABLET ORAL
Qty: 16 | Refills: 0
Start: 2020-12-01

## 2020-12-01 RX ORDER — IBUPROFEN 200 MG
1 TABLET ORAL
Qty: 30 | Refills: 0
Start: 2020-12-01

## 2020-12-01 RX ORDER — HYDROMORPHONE HYDROCHLORIDE 2 MG/ML
1 INJECTION INTRAMUSCULAR; INTRAVENOUS; SUBCUTANEOUS ONCE
Refills: 0 | Status: DISCONTINUED | OUTPATIENT
Start: 2020-12-01 | End: 2020-12-01

## 2020-12-01 RX ADMIN — HYDROMORPHONE HYDROCHLORIDE 1 MILLIGRAM(S): 2 INJECTION INTRAMUSCULAR; INTRAVENOUS; SUBCUTANEOUS at 22:04

## 2020-12-01 RX ADMIN — Medication 800 MILLIGRAM(S): at 22:34

## 2020-12-01 RX ADMIN — OXYCODONE AND ACETAMINOPHEN 1 TABLET(S): 5; 325 TABLET ORAL at 23:38

## 2020-12-01 RX ADMIN — Medication 800 MILLIGRAM(S): at 22:04

## 2020-12-01 RX ADMIN — TETANUS TOXOID, REDUCED DIPHTHERIA TOXOID AND ACELLULAR PERTUSSIS VACCINE, ADSORBED 0.5 MILLILITER(S): 5; 2.5; 8; 8; 2.5 SUSPENSION INTRAMUSCULAR at 23:04

## 2020-12-01 RX ADMIN — HYDROMORPHONE HYDROCHLORIDE 1 MILLIGRAM(S): 2 INJECTION INTRAMUSCULAR; INTRAVENOUS; SUBCUTANEOUS at 22:19

## 2020-12-01 NOTE — ED PROVIDER NOTE - PSH
Prescription approved per [facility].     Bartholin cyst  in november, had drainage at [facility]

## 2020-12-01 NOTE — ED PROVIDER NOTE - NSFOLLOWUPINSTRUCTIONS_ED_ALL_ED_FT
-take ibuprofen for pain as directed  -take percocet in addition for stronger pain  -keep knee in knee immobilizer brace. use crutches.  -see orthopedist this week      Knee Pain    WHAT YOU NEED TO KNOW:    Knee pain may start suddenly, or it may be a long-term problem. You may have pain on the side, front, or back of your knee. You may have knee stiffness and swelling. You may hear popping sounds or feel like your knee is giving way or locking up as you walk. You may feel pain when you sit, stand, walk, or climb up and down stairs. Knee pain can be caused by conditions such as obesity, inflammation, or strains or tears in ligaments or tendons.     DISCHARGE INSTRUCTIONS:    Return to the emergency department if:   •Your pain is worse, even after treatment.       •You cannot bend or straighten your leg completely.       •The swelling around your knee does not go down even with treatment.      •Your knee is painful and hot to the touch.       Contact your healthcare provider if:   •You have questions or concerns about your condition or care.           Medicines: You may need any of the following:   •NSAIDs help decrease swelling and pain or fever. This medicine is available with or without a doctor's order. NSAIDs can cause stomach bleeding or kidney problems in certain people. If you take blood thinner medicine, always ask your healthcare provider if NSAIDs are safe for you. Always read the medicine label and follow directions.      •Acetaminophen decreases pain and fever. It is available without a doctor's order. Ask how much to take and how often to take it. Follow directions. Read the labels of all other medicines you are using to see if they also contain acetaminophen, or ask your doctor or pharmacist. Acetaminophen can cause liver damage if not taken correctly. Do not use more than 4 grams (4,000 milligrams) total of acetaminophen in one day.       •Prescription pain medicine may be given. Ask your healthcare provider how to take this medicine safely. Some prescription pain medicines contain acetaminophen. Do not take other medicines that contain acetaminophen without talking to your healthcare provider. Too much acetaminophen may cause liver damage. Prescription pain medicine may cause constipation. Ask your healthcare provider how to prevent or treat constipation.       •Take your medicine as directed. Contact your healthcare provider if you think your medicine is not helping or if you have side effects. Tell him or her if you are allergic to any medicine. Keep a list of the medicines, vitamins, and herbs you take. Include the amounts, and when and why you take them. Bring the list or the pill bottles to follow-up visits. Carry your medicine list with you in case of an emergency.      What you can do to manage your symptoms:   •Rest your knee so it can heal. Limit activities that increase your pain. Do low-impact exercises, such as walking or swimming.       •Apply ice to help reduce swelling and pain. Use an ice pack, or put crushed ice in a plastic bag. Cover it with a towel before you apply it to your knee. Apply ice for 15 to 20 minutes every hour, or as directed.      •Apply compression to help reduce swelling. Use a brace or bandage only as directed.      •Elevate your knee to help decrease pain and swelling. Elevate your knee while you are sitting or lying down. Prop your leg on pillows to keep your knee above the level of your heart.      •Prevent your knee from moving as directed. Your healthcare provider may put on a cast or splint. You may need to wear a leg brace to stabilize your knee. A leg brace can be adjusted to increase your range of motion as your knee heals.  Hinged Knee Braces            What you can do to prevent knee pain:   •Maintain a healthy weight. Extra weight increases your risk for knee pain. Ask your healthcare provider how much you should weigh. He or she can help you create a safe weight loss plan if you need to lose weight.      •Exercise or train properly. Use the correct equipment for sports. Wear shoes that provide good support. Check your posture often as you exercise, play sports, or train for an event. This can help prevent stress and strain on your knees. Rest between sessions so you do not overwork your knees.      Follow up with your healthcare provider within 24 hours or as directed: You may need follow-up treatments, such as steroid injections to decrease pain. Write down your questions so you remember to ask them during your visits.         Crutch Instructions    WHAT YOU NEED TO KNOW:    Crutches are tools that provide support and balance when you walk. You may need 1 or 2 crutches to help support your body weight. You may need crutches if you had surgery or an injury that affects your ability to walk.    DISCHARGE INSTRUCTIONS:    How to use crutches safely:   •Support your weight with your arms and hands. Do not support your weight with your armpits. This could hurt the nerves that are in your underarms. Keep your elbow bent when the crutch is in place under your arm.      •Walk slowly and carefully with crutches. Go up and down stairs and ramps slowly, and stop to rest when you feel tired. Get up slowly to a sitting or standing position. This will help prevent dizziness and fainting. Use your crutches only on firm ground. Use caution when you walk on ice or snow. Wet or waxed floors and smooth cement floors can be slippery. Watch out for small rugs or cords.       How to walk with crutches:   •Place both crutches under your arms, and place your hands on the hand  of the crutches. Place your crutches slightly in front of you.       •The top of the crutches should be about 2 fingers [id number] (about 1½ inches) below your armpits. Place your weight on your hands. The top of the crutches should not press into your armpits.      •If you have one leg that is injured, keep it off the floor by bending your knee.      •Lift the crutches and move them a step ahead of you. Put the rubber ends of the crutches firmly on the ground. Move the foot that is not injured between the crutches. Place that heel down first.      •If you are using your crutches for balance, move your right foot and left crutch forward. Then move your left foot and right crutch forward. Keep walking this way.  Walking with Crutches           How to go up stairs with crutches:   •Face the stairs. Put the crutches close to the first step.      •Push onto the crutches and put your uninjured leg on the first step.      •Put your weight on your uninjured leg that is on the first step. Bring both crutches and the injured leg onto the step at the same time.      •When you hold onto a railing with one arm, put both crutches under the other arm. Use the railing to help you go up stairs.   Crutches Going Upstairs With Crutches           How to go down stairs with crutches:   •Stand with the toes of your uninjured leg close to the edge of the step.      •Bend the knee of your uninjured leg. Slowly lower both crutches along with the injured leg onto the next step.       •Lean on your crutches. Slowly lower your uninjured leg onto the same step.      •Place both crutches under one arm while you hold onto the railing with the other arm.  Crutches Going Downstairs with Crutches           How to sit in a chair with crutches:   •Turn and back up to the chair until you feel the edge of it against the back of your legs. Keep your injured leg forward.      •Take your crutches out from under your arms. Sit while bending your uninjured knee.  Crutches Sitting Down with Crutches           How to get up from a chair with crutches:   •Sit on the edge of your chair.       •Push up with your hands using the crutches or arms of the chair. Put your weight on your uninjured foot as you get up.      •Keep your injured leg bent at the knee and off the floor.  Crutches Standing Up with Crutches           Contact your healthcare provider if:   •Your crutches do not fit.      •One crutch is longer than the other.      •Your crutches break or get lost.      •The rubber tips of your crutches are split or loose.      •You get blisters or painful calluses on your hands or armpits.      •Your armpit is red, sore, or has bumps or pimples.       •Your arm muscles get weaker the longer you use the crutches.      •You have questions or concerns about your condition or care.      Return to the emergency department if:   •You have sudden numbness in a hand or arm.      •Your fingers feel cold or have cramping pain.

## 2020-12-01 NOTE — ED PROVIDER NOTE - CARE PROVIDER_API CALL
Patrice Casey  ORTHOPAEDIC SURGERY  [address], [address]  [place] [address]  Phone: [phone]  Fax: [phone]  Follow Up Time: 1-3 Days

## 2020-12-01 NOTE — ED ADULT NURSE NOTE - NSIMPLEMENTINTERV_GEN_ALL_ED
Implemented All Universal Safety Interventions:  [place] to call system. Call bell, personal items and telephone within reach. Instruct patient to call for assistance. Room bathroom lighting operational. Non-slip footwear when patient is off stretcher. Physically safe environment: no spills, clutter or unnecessary equipment. Stretcher in lowest position, wheels locked, appropriate side rails in place.

## 2020-12-01 NOTE — ED PROVIDER NOTE - CLINICAL SUMMARY MEDICAL DECISION MAKING FREE TEXT BOX
R knee pain p fall. partial ddx: knee fx, sprain, ligament injury.  no signs of dislocation. good distal circulation.  dilaudid for pain. check xr. R knee pain p fall. partial ddx: knee fx, sprain, ligament injury.  no signs of dislocation. good distal circulation.  dilaudid for pain. check xr.    update: xr neg. pain better p meds. pulses and distal sensatino, motor still normal.  knee immobilizer placed by rn. r/u ortho. crutches given

## 2020-12-01 NOTE — ED PROVIDER NOTE - PATIENT PORTAL LINK FT
You can access the FollowMyHealth Patient Portal offered by [facility] by registering at the following website: [email or web address]. By joining [employer]’s FollowMyHealth portal, you will also be able to view your health information using other applications (apps) compatible with our system.

## 2020-12-01 NOTE — ED PROVIDER NOTE - MUSCULOSKELETAL, MLM
no c/t/L spine ttp.  pelvis stable nontender, hips nontender.  RLE: no gross deformity. no swelling. R knee mild diffuse ttp, no swelling. limited ROM due to pain. held about 30deg flexed. nontender thigh, lower leg.  strong 2+ DP bilat.  + gross sensation distally.  normal color and warmth R foot no c/t/L spine ttp.  pelvis stable nontender, hips nontender.  RLE: no gross deformity. no swelling. R knee mild diffuse ttp, no swelling. limited ROM due to pain. held about 30deg flexed. nontender thigh, lower leg.  strong 2+ DP bilat.  + gross sensation distally.  normal color and warmth R foot/  L knee with abrasion. nontender, no swelling no laxity, FROM s pain

## 2020-12-01 NOTE — ED PROVIDER NOTE - OBJECTIVE STATEMENT
pt c/o R knee pain, sharp, severe, about 20min pta tonight. pt was walking and states knee gave out and pt twisted knee, fell.  did not hit head. no LOC or other injury reported. assoc with numbness in R leg. pt had 3 alcoholic drinks tonight

## 2020-12-02 ENCOUNTER — NON-APPOINTMENT (OUTPATIENT)
Age: 35
End: 2020-12-02

## 2020-12-07 ENCOUNTER — APPOINTMENT (OUTPATIENT)
Dept: ORTHOPEDIC SURGERY | Facility: CLINIC | Age: 35
End: 2020-12-07
Payer: MEDICAID

## 2020-12-07 VITALS — WEIGHT: 150 LBS | HEIGHT: 61 IN | BODY MASS INDEX: 28.32 KG/M2

## 2020-12-07 PROCEDURE — 99072 ADDL SUPL MATRL&STAF TM PHE: CPT

## 2020-12-07 PROCEDURE — 99203 OFFICE O/P NEW LOW 30 MIN: CPT | Mod: 25

## 2020-12-07 PROCEDURE — 20610 DRAIN/INJ JOINT/BURSA W/O US: CPT | Mod: RT

## 2020-12-08 NOTE — DISCUSSION/SUMMARY
[de-identified] : The underlying pathophysiology was reviewed in great detail with the patient as well as the various treatment options, including ice, analgesics, NSAIDs, Physical therapy, steroid injections.\par \par A prescription was provided for a MRI of the right knee to rule out ACL tear. \par \par Patient received an aspiration of the right knee today. \par \par A hinged knee brace was provided and fit in clinic today. Recommend use of crutches for ambulation. Patient states she has a pair at home. \par \par Activity modifications and restrictions were discussed. I advised avoiding deep bending, squatting and high intensity activity. \par \par FU once MRI results are obtained. \par \par All questions were answered, all alternatives discussed and the patient is in complete agreement with that plan. Follow-up appointment as instructed. Any issues and the patient will call or come in sooner.

## 2020-12-08 NOTE — PROCEDURE
[de-identified] : At this point I recommended aspiration and under sterile precautions an injection of 2 cc 1% lidocaine -was placed into the joint of the right knee without complication and bloody fluid was aspirated: 35 cc total fluid removed.\par \par

## 2020-12-08 NOTE — PHYSICAL EXAM
[de-identified] : Right Lower Extremity\par o Knee :\par ¦ Inspection/Palpation : diffuse tenderness to palpation,2+ effusion,  no deformity\par ¦ Range of Motion : 5 - 45 degrees, no crepitus\par ¦ Stability : no valgus or varus instability present on provocative testing, Lachman’s Test (2+) \par ¦ Strength : flexion and extension- not assessed today due to pain. \par o Muscle Bulk : normal muscle bulk present\par o Skin : no erythema, no ecchymosis\par o Sensation : sensation to pin intact\par o Vascular Exam : no edema, no cyanosis, dorsalis pedis artery pulse 2+, posterior tibial artery pulse 2+\par \par Left Lower Extremity\par o Knee :\par ¦ Inspection/Palpation : no tenderness to palpation, no swelling, no deformity\par ¦ Range of Motion : 0 -120 degrees, no crepitus\par ¦ Stability : no valgus or varus instability present on provocative testing, Lachman’s Test (-)\par ¦ Strength : flexion and extension 5/5\par o Muscle Bulk : normal muscle bulk present\par o Skin : no erythema, no ecchymosis\par o Sensation : sensation to pin intact\par o Vascular Exam : no edema, no cyanosis, dorsalis pedis artery pulse 2+, posterior tibial artery pulse 2+\par \par \par \par   [de-identified] : o Xray of the right knee performed on 12/01/2020 at [facility]: Impression: \par ¦ There is no evidence for acute fracture, dislocation, joint space narrowing, soft tissue swelling or retained radiodense foreign body. No significant suprapatellar joint effusion is identified. A stable sclerotic focus is identified within the proximal right tibia, likely bone island.\par \par \par \par
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
## 2020-12-15 ENCOUNTER — OUTPATIENT (OUTPATIENT)
Dept: OUTPATIENT SERVICES | Facility: HOSPITAL | Age: 35
LOS: 1 days | End: 2020-12-15
Payer: MEDICAID

## 2020-12-15 ENCOUNTER — APPOINTMENT (OUTPATIENT)
Dept: ORTHOPEDIC SURGERY | Facility: CLINIC | Age: 35
End: 2020-12-15

## 2020-12-15 ENCOUNTER — APPOINTMENT (OUTPATIENT)
Dept: MRI IMAGING | Facility: HOSPITAL | Age: 35
End: 2020-12-15
Payer: MEDICAID

## 2020-12-15 DIAGNOSIS — S83.511A SPRAIN OF ANTERIOR CRUCIATE LIGAMENT OF RIGHT KNEE, INITIAL ENCOUNTER: ICD-10-CM
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
## 2020-12-15 DIAGNOSIS — N75.0 CYST OF BARTHOLIN'S GLAND: Chronic | ICD-10-CM
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
## 2020-12-15 DIAGNOSIS — M25.561 PAIN IN RIGHT KNEE: ICD-10-CM
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
## 2020-12-15 PROCEDURE — 73721 MRI JNT OF LWR EXTRE W/O DYE: CPT | Mod: 26,RT

## 2020-12-15 PROCEDURE — 73721 MRI JNT OF LWR EXTRE W/O DYE: CPT

## 2020-12-17 ENCOUNTER — NON-APPOINTMENT (OUTPATIENT)
Age: 35
End: 2020-12-17

## 2020-12-21 ENCOUNTER — APPOINTMENT (OUTPATIENT)
Dept: ORTHOPEDIC SURGERY | Facility: CLINIC | Age: 35
End: 2020-12-21

## 2020-12-22 ENCOUNTER — APPOINTMENT (OUTPATIENT)
Dept: ORTHOPEDIC SURGERY | Facility: CLINIC | Age: 35
End: 2020-12-22

## 2020-12-24 ENCOUNTER — APPOINTMENT (OUTPATIENT)
Dept: ORTHOPEDIC SURGERY | Facility: CLINIC | Age: 35
End: 2020-12-24
Payer: MEDICAID

## 2020-12-24 PROCEDURE — 99072 ADDL SUPL MATRL&STAF TM PHE: CPT

## 2020-12-24 PROCEDURE — 99214 OFFICE O/P EST MOD 30 MIN: CPT

## 2020-12-26 NOTE — PHYSICAL EXAM
[de-identified] : Right Lower Extremity\par o Knee :\par ¦ Inspection/Palpation : diffuse tenderness to palpation, no effusion,  no deformity\par ¦ Range of Motion : 10 - 95 degrees, no crepitus\par ¦ Stability : no valgus or varus instability present on provocative testing, Lachman’s Test (2+) \par ¦ Strength : flexion and extension- not assessed today due to pain. \par o Muscle Bulk : normal muscle bulk present\par o Skin : no erythema, no ecchymosis\par o Sensation : sensation to pin intact\par o Vascular Exam : no edema, no cyanosis, dorsalis pedis artery pulse 2+, posterior tibial artery pulse 2+\par \par Left Lower Extremity\par o Knee :\par ¦ Inspection/Palpation : no tenderness to palpation, no swelling, no deformity\par ¦ Range of Motion : 0 -120 degrees, no crepitus\par ¦ Stability : no valgus or varus instability present on provocative testing, Lachman’s Test (-)\par ¦ Strength : flexion and extension 5/5\par o Muscle Bulk : normal muscle bulk present\par o Skin : no erythema, no ecchymosis\par o Sensation : sensation to pin intact\par o Vascular Exam : no edema, no cyanosis, dorsalis pedis artery pulse 2+, posterior tibial artery pulse 2+\par \par \par \par   [de-identified] : o MRI of the right knee performed on 12/15/2020 at [facility]: impression \par ¦ Complete tear of the anterior cruciate ligament.\par ¦ Attenuation and increased signal at the posterior root insertion of the medial meniscus, suggestive of at least a partial tear.\par ¦ Impaction fracture/contusions within the lateral femoral condyle and posterior aspect of the medial and lateral tibial plateaus.\par \par \par
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
## 2020-12-26 NOTE — DISCUSSION/SUMMARY
[de-identified] : The underlying pathophysiology was reviewed in great detail with the patient as well as the various treatment options, including ice, analgesics, NSAIDs, Physical therapy, steroid injections and surgery.\par \par MRI of the right knee was reviewed and discussed in great detail today. \par \par A hinged knee brace was provided and fit in clinic at her last visit.  Recommend use of crutches for ambulation. Patient states she has a pair at home. \par \par Activity modifications and restrictions were discussed.\par \par The patient wishes to proceed with SURGICAL INTERVENTION at this time. The risks and benefits of a ARTHROSCOPIC ACL RECONSTRUCTION and possible meniscal root repair  were discussed in great detail today, including but not limited to bleeding, infection, nerve injury, DVT, allergy to the anesthetic or to the implants, re-tear, persistent pain, stiffness, scarring, swelling or deformity.\par \par A prescription for Physical Therapy was provided. Discussed importance of regaining range of motion prior to surgical intervention. \par \par FU in 2 weeks. \par \par All questions were answered, all alternatives discussed and the patient is in complete agreement with that plan. Follow-up appointment as instructed. Any issues and the patient will call or come in sooner.

## 2021-01-21 ENCOUNTER — APPOINTMENT (OUTPATIENT)
Dept: ORTHOPEDIC SURGERY | Facility: CLINIC | Age: 36
End: 2021-01-21

## 2021-03-11 ENCOUNTER — APPOINTMENT (OUTPATIENT)
Dept: ORTHOPEDIC SURGERY | Facility: CLINIC | Age: 36
End: 2021-03-11
Payer: MEDICAID

## 2021-03-11 PROCEDURE — 73560 X-RAY EXAM OF KNEE 1 OR 2: CPT | Mod: RT

## 2021-03-11 PROCEDURE — 99072 ADDL SUPL MATRL&STAF TM PHE: CPT

## 2021-03-11 PROCEDURE — 99214 OFFICE O/P EST MOD 30 MIN: CPT

## 2021-03-11 NOTE — PHYSICAL EXAM
[de-identified] : Right Lower Extremity\par o Knee :\par ¦ Inspection/Palpation : diffuse tenderness to palpation, no effusion,  no deformity\par ¦ Range of Motion : 10 - 95 degrees, no crepitus\par ¦ Stability : no valgus or varus instability present on provocative testing, Lachman’s Test (2+) \par ¦ Strength : flexion and extension- not assessed today due to pain. \par o Muscle Bulk : normal muscle bulk present\par o Skin : no erythema, no ecchymosis\par o Sensation : sensation to pin intact\par o Vascular Exam : no edema, no cyanosis, dorsalis pedis artery pulse 2+, posterior tibial artery pulse 2+\par \par Left Lower Extremity\par o Knee :\par ¦ Inspection/Palpation : no tenderness to palpation, no swelling, no deformity\par ¦ Range of Motion : 0 -120 degrees, no crepitus\par ¦ Stability : no valgus or varus instability present on provocative testing, Lachman’s Test (-)\par ¦ Strength : flexion and extension 5/5\par o Muscle Bulk : normal muscle bulk present\par o Skin : no erythema, no ecchymosis\par o Sensation : sensation to pin intact\par o Vascular Exam : no edema, no cyanosis, dorsalis pedis artery pulse 2+, posterior tibial artery pulse 2+\par \par \par \par   [de-identified] : o Right Knee : AP, and lateral views of the knee were obtained, there are no soft tissue abnormalities, no fractures, alignment is normal, normal appearing joint spaces, normal bone density, no bony lesions.\par \par
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
## 2021-03-11 NOTE — DISCUSSION/SUMMARY
[de-identified] : The underlying pathophysiology was reviewed in great detail with the patient as well as the various treatment options, including ice, analgesics, NSAIDs, Physical therapy, steroid injections and surgery.\par \par MRI of the right knee was reviewed and discussed again in great detail today. \par \par A prescription was provided for a new MRI of the right knee  to rule out further ligamentous injury s/p new injury. \par \par A hinged knee brace was provided and fit in clinic at her last visit.  Recommend use of crutches for ambulation. Patient states she has a pair at home. \par \par Activity modifications and restrictions were discussed.\par \par The risks and benefits of a ARTHROSCOPIC ACL RECONSTRUCTION and possible meniscal root repair  were discussed in great detail today, including but not limited to bleeding, infection, nerve injury, DVT, allergy to the anesthetic or to the implants, re-tear, persistent pain, stiffness, scarring, swelling or deformity. Will wait for new MRI before further discussing survival intervention. \par \par FU once MRI results are obtained. \par \par All questions were answered, all alternatives discussed and the patient is in complete agreement with that plan. Follow-up appointment as instructed. Any issues and the patient will call or come in sooner.

## 2021-03-30 ENCOUNTER — APPOINTMENT (OUTPATIENT)
Dept: MRI IMAGING | Facility: HOSPITAL | Age: 36
End: 2021-03-30

## 2021-03-31 ENCOUNTER — OUTPATIENT (OUTPATIENT)
Dept: OUTPATIENT SERVICES | Facility: HOSPITAL | Age: 36
LOS: 1 days | End: 2021-03-31
Payer: MEDICAID

## 2021-03-31 ENCOUNTER — APPOINTMENT (OUTPATIENT)
Dept: FAMILY MEDICINE | Facility: HOSPITAL | Age: 36
End: 2021-03-31

## 2021-03-31 ENCOUNTER — RESULT CHARGE (OUTPATIENT)
Age: 36
End: 2021-03-31

## 2021-03-31 VITALS
TEMPERATURE: 97.9 F | RESPIRATION RATE: 16 BRPM | HEART RATE: 80 BPM | HEIGHT: 61 IN | DIASTOLIC BLOOD PRESSURE: 83 MMHG | SYSTOLIC BLOOD PRESSURE: 138 MMHG | WEIGHT: 161 LBS | OXYGEN SATURATION: 97 % | BODY MASS INDEX: 30.4 KG/M2

## 2021-03-31 VITALS — RESPIRATION RATE: 16 BRPM

## 2021-03-31 DIAGNOSIS — Z00.00 ENCOUNTER FOR GENERAL ADULT MEDICAL EXAMINATION WITHOUT ABNORMAL FINDINGS: ICD-10-CM

## 2021-03-31 DIAGNOSIS — Z76.89 PERSONS ENCOUNTERING HEALTH SERVICES IN OTHER SPECIFIED CIRCUMSTANCES: ICD-10-CM

## 2021-03-31 DIAGNOSIS — M25.561 PAIN IN RIGHT KNEE: ICD-10-CM

## 2021-03-31 DIAGNOSIS — R07.89 OTHER CHEST PAIN: ICD-10-CM

## 2021-03-31 DIAGNOSIS — Z87.898 PERSONAL HISTORY OF OTHER SPECIFIED CONDITIONS: ICD-10-CM

## 2021-03-31 DIAGNOSIS — Z29.9 ENCOUNTER FOR PROPHYLACTIC MEASURES, UNSPECIFIED: ICD-10-CM

## 2021-03-31 DIAGNOSIS — N75.0 CYST OF BARTHOLIN'S GLAND: Chronic | ICD-10-CM

## 2021-03-31 DIAGNOSIS — Z87.19 PERSONAL HISTORY OF OTHER DISEASES OF THE DIGESTIVE SYSTEM: ICD-10-CM

## 2021-03-31 LAB
BILIRUB UR QL STRIP: NORMAL
CLARITY UR: CLEAR
COLLECTION METHOD: NORMAL
GLUCOSE UR-MCNC: NORMAL
HCG UR QL: 0.2 EU/DL
HGB UR QL STRIP.AUTO: NORMAL
KETONES UR-MCNC: NORMAL
LEUKOCYTE ESTERASE UR QL STRIP: NORMAL
NITRITE UR QL STRIP: NORMAL
PH UR STRIP: 5
PROT UR STRIP-MCNC: NORMAL
SP GR UR STRIP: 1.03

## 2021-03-31 PROCEDURE — 83036 HEMOGLOBIN GLYCOSYLATED A1C: CPT

## 2021-03-31 PROCEDURE — G0463: CPT

## 2021-03-31 PROCEDURE — 80053 COMPREHEN METABOLIC PANEL: CPT

## 2021-03-31 PROCEDURE — 80061 LIPID PANEL: CPT

## 2021-03-31 PROCEDURE — 84443 ASSAY THYROID STIM HORMONE: CPT

## 2021-03-31 PROCEDURE — 87086 URINE CULTURE/COLONY COUNT: CPT

## 2021-03-31 PROCEDURE — 85025 COMPLETE CBC W/AUTO DIFF WBC: CPT

## 2021-03-31 RX ORDER — SERTRALINE 25 MG/1
25 TABLET, FILM COATED ORAL DAILY
Qty: 90 | Refills: 1 | Status: DISCONTINUED | COMMUNITY
Start: 2021-03-31 | End: 2021-03-31

## 2021-04-01 DIAGNOSIS — F32.9 MAJOR DEPRESSIVE DISORDER, SINGLE EPISODE, UNSPECIFIED: ICD-10-CM

## 2021-04-01 DIAGNOSIS — R30.0 DYSURIA: ICD-10-CM

## 2021-04-01 DIAGNOSIS — E04.9 NONTOXIC GOITER, UNSPECIFIED: ICD-10-CM

## 2021-04-01 DIAGNOSIS — F41.1 GENERALIZED ANXIETY DISORDER: ICD-10-CM

## 2021-04-01 NOTE — HISTORY OF PRESENT ILLNESS
[FreeTextEntry8] : 35F wit PMH of Depression, Anxiety, Goiter presents with complaints of left flank pain and LUQ and epigastric pain x 2 days. States a month ago, she developed dysuria which was not relived by taking monostat. States pain eventually resolved. 2 days ago she started to have left flank pain with urinating. Denies fever, chills, nausea, vomiting. \par Reports LUQ and epigastric pain as well. States pain is non radiating, intermittent, worse with urinating and drinking alcohol. Pt has been drinking more than 2 drinks every night due to anxiety and stress to be able to sleep. \par Reports severe anxiety due lots of family stressors as she lost her job and she also got custody of her sisters kids including a 1 year old. She also has been dealing with her sister who is a drug and alcohol addict. States she is in court everyday the custody battles. She drinks alcohol daily and smokes often to help relieve the anxiety and calm her down. States she smokes 3-4 cigarettes daily when she drinks alcohol.

## 2021-04-01 NOTE — PHYSICAL EXAM
[No Acute Distress] : no acute distress [Well Nourished] : well nourished [Well Developed] : well developed [Well-Appearing] : well-appearing [Normal Sclera/Conjunctiva] : normal sclera/conjunctiva [PERRL] : pupils equal round and reactive to light [EOMI] : extraocular movements intact [No Respiratory Distress] : no respiratory distress  [No Accessory Muscle Use] : no accessory muscle use [Clear to Auscultation] : lungs were clear to auscultation bilaterally [Normal Rate] : normal rate  [Regular Rhythm] : with a regular rhythm [Normal S1, S2] : normal S1 and S2 [No Murmur] : no murmur heard [Soft] : abdomen soft [Non Tender] : non-tender [Non-distended] : non-distended [Normal Bowel Sounds] : normal bowel sounds [Normal Affect] : the affect was normal [No CVA Tenderness] : no CVA  tenderness [Alert and Oriented x3] : oriented to person, place, and time [Normal Insight/Judgement] : insight and judgment were intact [de-identified] : Neck fullness [de-identified] : No CVA tenderness

## 2021-04-01 NOTE — REVIEW OF SYSTEMS
[Abdominal Pain] : abdominal pain [Dysuria] : dysuria [Anxiety] : anxiety [Depression] : depression [Negative] : Genitourinary [Suicidal] : not suicidal [Insomnia] : no insomnia

## 2021-04-05 ENCOUNTER — APPOINTMENT (OUTPATIENT)
Dept: MRI IMAGING | Facility: HOSPITAL | Age: 36
End: 2021-04-05

## 2021-04-05 LAB
ALBUMIN SERPL ELPH-MCNC: 4.5 G/DL
ALP BLD-CCNC: 71 U/L
ALT SERPL-CCNC: 15 U/L
ANION GAP SERPL CALC-SCNC: 11 MMOL/L
AST SERPL-CCNC: 16 U/L
BACTERIA UR CULT: NORMAL
BASOPHILS # BLD AUTO: 0.05 K/UL
BASOPHILS NFR BLD AUTO: 0.5 %
BILIRUB SERPL-MCNC: 0.2 MG/DL
BUN SERPL-MCNC: 14 MG/DL
CALCIUM SERPL-MCNC: 9.5 MG/DL
CHLORIDE SERPL-SCNC: 102 MMOL/L
CHOLEST SERPL-MCNC: 248 MG/DL
CO2 SERPL-SCNC: 24 MMOL/L
CREAT SERPL-MCNC: 0.84 MG/DL
EOSINOPHIL # BLD AUTO: 0.21 K/UL
EOSINOPHIL NFR BLD AUTO: 2.1 %
ESTIMATED AVERAGE GLUCOSE: 100 MG/DL
GLUCOSE SERPL-MCNC: 91 MG/DL
HBA1C MFR BLD HPLC: 5.1 %
HCT VFR BLD CALC: 36.9 %
HDLC SERPL-MCNC: 81 MG/DL
HGB BLD-MCNC: 12.1 G/DL
IMM GRANULOCYTES NFR BLD AUTO: 0.4 %
LDLC SERPL CALC-MCNC: 128 MG/DL
LYMPHOCYTES # BLD AUTO: 3.34 K/UL
LYMPHOCYTES NFR BLD AUTO: 33.1 %
MAN DIFF?: NORMAL
MCHC RBC-ENTMCNC: 31.3 PG
MCHC RBC-ENTMCNC: 32.8 GM/DL
MCV RBC AUTO: 95.3 FL
MONOCYTES # BLD AUTO: 0.84 K/UL
MONOCYTES NFR BLD AUTO: 8.3 %
NEUTROPHILS # BLD AUTO: 5.61 K/UL
NEUTROPHILS NFR BLD AUTO: 55.6 %
NONHDLC SERPL-MCNC: 167 MG/DL
PLATELET # BLD AUTO: 241 K/UL
POTASSIUM SERPL-SCNC: 4.2 MMOL/L
PROT SERPL-MCNC: 7.4 G/DL
RBC # BLD: 3.87 M/UL
RBC # FLD: 12.7 %
SODIUM SERPL-SCNC: 138 MMOL/L
TRIGL SERPL-MCNC: 197 MG/DL
TSH SERPL-ACNC: 0.93 UIU/ML
WBC # FLD AUTO: 10.09 K/UL

## 2021-04-08 ENCOUNTER — APPOINTMENT (OUTPATIENT)
Dept: FAMILY MEDICINE | Facility: HOSPITAL | Age: 36
End: 2021-04-08

## 2021-04-22 ENCOUNTER — APPOINTMENT (OUTPATIENT)
Dept: MRI IMAGING | Facility: HOSPITAL | Age: 36
End: 2021-04-22
Payer: MEDICAID

## 2021-04-22 ENCOUNTER — OUTPATIENT (OUTPATIENT)
Dept: OUTPATIENT SERVICES | Facility: HOSPITAL | Age: 36
LOS: 1 days | End: 2021-04-22
Payer: MEDICAID

## 2021-04-22 ENCOUNTER — RESULT REVIEW (OUTPATIENT)
Age: 36
End: 2021-04-22

## 2021-04-22 DIAGNOSIS — S83.511A SPRAIN OF ANTERIOR CRUCIATE LIGAMENT OF RIGHT KNEE, INITIAL ENCOUNTER: ICD-10-CM

## 2021-04-22 DIAGNOSIS — N75.0 CYST OF BARTHOLIN'S GLAND: Chronic | ICD-10-CM

## 2021-04-22 PROCEDURE — 73721 MRI JNT OF LWR EXTRE W/O DYE: CPT

## 2021-04-22 PROCEDURE — 73721 MRI JNT OF LWR EXTRE W/O DYE: CPT | Mod: 26,RT

## 2021-04-23 ENCOUNTER — APPOINTMENT (OUTPATIENT)
Dept: FAMILY MEDICINE | Facility: HOSPITAL | Age: 36
End: 2021-04-23

## 2021-04-26 ENCOUNTER — NON-APPOINTMENT (OUTPATIENT)
Age: 36
End: 2021-04-26

## 2021-04-29 ENCOUNTER — APPOINTMENT (OUTPATIENT)
Dept: ORTHOPEDIC SURGERY | Facility: CLINIC | Age: 36
End: 2021-04-29
Payer: MEDICAID

## 2021-04-29 PROCEDURE — 99214 OFFICE O/P EST MOD 30 MIN: CPT

## 2021-04-29 PROCEDURE — 99072 ADDL SUPL MATRL&STAF TM PHE: CPT

## 2021-04-29 NOTE — DISCUSSION/SUMMARY
[de-identified] : The underlying pathophysiology was reviewed in great detail with the patient as well as the various treatment options, including ice, analgesics, NSAIDs, Physical therapy, steroid injections and surgery.\par \par I advised Lidoderm patches or Voltaren gel to the anterior shin\par \par MRI of the right knee was reviewed and discussed in great detail today. \par \par F/U 6 weeks\par \par All questions were answered, all alternatives discussed and the patient is in complete agreement with that plan. Follow-up appointment as instructed. Any issues and the patient will call or come in sooner.
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
## 2021-04-29 NOTE — PHYSICAL EXAM
[de-identified] : Right Lower Extremity\par o Knee :\par ¦ Inspection/Palpation : diffuse tenderness to palpation along the anterior shin, mild lateral joint line tenderness, no effusion,  no deformity\par ¦ Range of Motion : 0 - 110 degrees, no crepitus\par ¦ Stability : no valgus or varus instability present on provocative testing, Lachman’s Test (2+) \par ¦ Strength : flexion and extension 4/5\par o Muscle Bulk : normal muscle bulk present\par o Skin : no erythema, no ecchymosis\par o Sensation : sensation to pin intact\par o Vascular Exam : no edema, no cyanosis, dorsalis pedis artery pulse 2+, posterior tibial artery pulse 2+\par \par Left Lower Extremity\par o Knee :\par ¦ Inspection/Palpation : no tenderness to palpation, no swelling, no deformity\par ¦ Range of Motion : 0 -120 degrees, no crepitus\par ¦ Stability : no valgus or varus instability present on provocative testing, Lachman’s Test (-)\par ¦ Strength : flexion and extension 5/5\par o Muscle Bulk : normal muscle bulk present\par o Skin : no erythema, no ecchymosis\par o Sensation : sensation to pin intact\par o Vascular Exam : no edema, no cyanosis, dorsalis pedis artery pulse 2+, posterior tibial artery pulse 2+\par \par \par \par   [de-identified] : Patient comes to today's visit with outside imaging already performed. I reviewed the images in detail with the patient and discussed the findings as highlighted below. \par \par EXAM: MR KNEE RT\par \par \par PROCEDURE DATE: 04/22/2021\par \par \par \par INTERPRETATION: EXAMINATION: MRI of the right knee\par \par HISTORY: Right knee pain\par \par TECHNIQUE: Multiplanar, multisequential MR imaging was performed.\par \par Comparison is made to a prior right knee MRI from 12/15/2020\par \par FINDINGS:\par \par Fluid: There is a small joint effusion. There is a very small Baker's cyst with leakage.\par \par Ligaments: There is redemonstrated complete tear of the ACL. The PCL and collateral ligaments are intact.\par \par Medial Compartment: There is redemonstrated marked diminution/attenuation of the inner aspect of the posterior horn of the medial meniscus adjacent to its root attachment. The remainder of the posterior horn and the body of the meniscus is also slightly diminutive. Findings are similar to prior. Articular cartilage is preserved.\par \par Lateral Compartment: No lateral meniscal tear. Preserved articular cartilage.\par \par Patellofemoral Compartment: Preserved articular cartilage.\par \par Extensor Mechanism: No tear of the quadriceps or patellar tendons.\par \par Bones: Previously described areas of marrow edema signal at the lateral femoral condyle and posterior aspect of the tibial plateau have nearly completely resolved.\par \par IMPRESSION: Redemonstrated complete tear of the ACL.\par Redemonstrated diminution of the body and posterior horn of the medial meniscus, similar to prior.\par Small joint effusion.\par \par \par

## 2021-05-20 ENCOUNTER — RX RENEWAL (OUTPATIENT)
Age: 36
End: 2021-05-20

## 2021-05-29 ENCOUNTER — EMERGENCY (EMERGENCY)
Facility: HOSPITAL | Age: 36
LOS: 1 days | Discharge: HOME | End: 2021-05-29
Attending: EMERGENCY MEDICINE | Admitting: EMERGENCY MEDICINE
Payer: MEDICAID

## 2021-05-29 VITALS
RESPIRATION RATE: 18 BRPM | DIASTOLIC BLOOD PRESSURE: 62 MMHG | HEART RATE: 104 BPM | OXYGEN SATURATION: 95 % | SYSTOLIC BLOOD PRESSURE: 109 MMHG

## 2021-05-29 VITALS
RESPIRATION RATE: 18 BRPM | OXYGEN SATURATION: 99 % | WEIGHT: 166.89 LBS | HEART RATE: 104 BPM | SYSTOLIC BLOOD PRESSURE: 122 MMHG | DIASTOLIC BLOOD PRESSURE: 72 MMHG | HEIGHT: 62 IN | TEMPERATURE: 97 F

## 2021-05-29 DIAGNOSIS — N75.0 CYST OF BARTHOLIN'S GLAND: Chronic | ICD-10-CM

## 2021-05-29 DIAGNOSIS — Z98.890 OTHER SPECIFIED POSTPROCEDURAL STATES: Chronic | ICD-10-CM

## 2021-05-29 PROCEDURE — 99284 EMERGENCY DEPT VISIT MOD MDM: CPT

## 2021-05-29 PROCEDURE — 99284 EMERGENCY DEPT VISIT MOD MDM: CPT | Mod: 25

## 2021-05-29 PROCEDURE — 96374 THER/PROPH/DIAG INJ IV PUSH: CPT

## 2021-05-29 RX ADMIN — Medication 1 MILLIGRAM(S): at 03:48

## 2021-05-29 NOTE — ED PROVIDER NOTE - CLINICAL SUMMARY MEDICAL DECISION MAKING FREE TEXT BOX
pt c h/o panic attacks and asthma p/w anxiety, sob after emotional upset. most likely panic attack. possibly triggering asthma.  given duoneb already and decadron.  lungs clear. no hypoxia. will give ativan iv. reassess pt c h/o panic attacks and asthma p/w anxiety, sob after emotional upset. most likely panic attack. possibly triggering asthma.  given duoneb already and decadron.  lungs clear. no hypoxia. will give ativan iv. reassess    0430: pt fell asleep.  feels much better now. denies SOB. calm. clear lungs.

## 2021-05-29 NOTE — ED ADULT TRIAGE NOTE - CHIEF COMPLAINT QUOTE
Pt BIBA from home with c/o panic attack. Received 10mg decadron IV and 2 duonebs. Pt states she feels better at this time.
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
## 2021-05-29 NOTE — ED PROVIDER NOTE - NSFOLLOWUPCLINICS_GEN_ALL_ED_FT
[facility]  Psychiatry  [address] [place] [address]  Phone: [phone]  Fax:   Follow Up Time: 4-6 Days

## 2021-05-29 NOTE — ED ADULT NURSE NOTE - NSFALLRSKASSESSTYPE_ED_ALL_ED
After Visit Summary   7/30/2018    Rian Malik    MRN: [id number]           Patient Information     Date Of Birth          1961        Visit Information        Provider Department      7/30/2018 2:00 PM Herminio Oswald MD [facility] Colon and Rectal Surgery        Today's Diagnoses     Cancer of sigmoid colon (H)    -  1       Follow-ups after your visit        Additional Services     GASTROENTEROLOGY ADULT REF PROCEDURE ONLY           PAC Visit Referral (For [facility] Only)                 Your next 10 appointments already scheduled     Aug 03, 2018  2:00 PM CDT   MR ABDOMEN W/O & W CONTRAST with MGMR1   [facility] ([facility])    [id number] [place] [address] [phone]           Take your medicines as usual, unless your doctor tells you not to. Bring a list of your current medicines to your exam (including vitamins, minerals and over-the-counter drugs). Also bring the results of similar scans you may have had.    You may or may not receive IV contrast for this exam pending the discretion of the Radiologist.   Do not eat or drink for 6 hours prior to exam.  The MRI machine uses a strong magnet. Please wear clothes without metal (snaps, zippers). A sweatsuit works well, or we may give you a hospital gown.  Please remove any body piercings and hair extensions before you arrive. You will also remove watches, jewelry, hairpins, wallets, dentures, partial dental plates and hearing aids. You may wear contact lenses, and you may be able to wear your rings. We have a safe place to keep your personal items, but it is safer to leave them at home.  **IMPORTANT** THE INSTRUCTIONS BELOW ARE ONLY FOR THOSE PATIENTS WHO HAVE BEEN PRESCRIBED SEDATION OR GENERAL ANESTHESIA DURING THEIR MRI PROCEDURE:  IF YOUR DOCTOR PRESCRIBED ORAL SEDATION (take medicine to help you relax during your exam):   You must get the medicine from your doctor (oral  medication) before you arrive. Bring the medicine to the exam. Do not take it at home. You ll be told when to take it upon arriving for your exam.   Arrive one hour early. Bring someone who can take you home after the test. Your medicine will make you sleepy. After the exam, you may not drive, take a bus or take a taxi by yourself.  IF YOUR DOCTOR PRESCRIBED IV SEDATION:   Arrive one hour early. Bring someone who can take you home after the test. Your medicine will make you sleepy. After the exam, you may not drive, take a bus or take a taxi by yourself.   No eating 6 hours before your exam. You may have clear liquids up until 4 hours before your exam. (Clear liquids include water, clear tea, black coffee and fruit juice without pulp.)  IF YOUR DOCTOR PRESCRIBED ANESTHESIA (be asleep for your exam):   Arrive 1 1/2 hours early. Bring someone who can take you home after the test. You may not drive, take a bus or take a taxi by yourself.   No eating 8 hours before your exam. You may have clear liquids up until 4 hours before your exam. (Clear liquids include water, clear tea, black coffee and fruit juice without pulp.)   You will spend four to five hours in the recovery room.  If you have any questions, please contact your Imaging Department exam site.            Aug 03, 2018  2:00 PM CDT   IMAGING NURSE OFFICE VISIT <15MIN with MG IMAGING NURSE   [facility])    [id number] [place] [address] [phone]            Aug 03, 2018  2:45 PM CDT   CT CHEST W CONTRAST with MGCT1   [facility])    [id number] [place] [address] [phone]           Please bring any scans or X-rays taken at other hospitals, if similar tests were done. Also bring a list of your medicines, including vitamins, minerals and over-the-counter drugs. It is safest to leave personal items at home.  Be sure to tell your  doctor:   If you have any allergies.   If there s any chance you are pregnant.   If you are breastfeeding.    If you have diabetes as your medication may need to be adjusted for this exam.  You will have contrast for this exam. To prepare:   Do not eat or drink for 2 hours before your exam. If you need to take medicine, you may take it with small sips of water. (We may ask you to take liquid medicine as well.)   The day before your exam, drink extra fluids at least six 8-ounce glasses (unless your doctor tells you to restrict your fluids).  Patients over 70 or patients with diabetes or kidney problems:   If you haven t had a blood test (creatinine test) within the last 30 days, the Cardiologist/Radiologist may require you to get this test prior to your exam.  Please wear loose clothing, such as a sweat suit or jogging clothes. Avoid snaps, zippers and other metal. We may ask you to undress and put on a hospital gown.  If you have any questions, please call the Imaging Department where you will have your exam.            Aug 24, 2018  1:00 PM CDT   Colonoscopy with Herminio Oswald MD   [facility] ([facility])    [address] [place] [address] [phone]            Aug 27, 2018  1:45 PM CDT   LAB with  LAB   [facility] ([facility])    [address] [place] [address] [phone]           Please do not eat 10-12 hours before your appointment if you are coming in fasting for labs on lipids, cholesterol, or glucose (sugar). This does not apply to pregnant women. Water, hot tea and black coffee (with nothing added) are okay. Do not drink other fluids, diet soda or chew gum.            Aug 27, 2018  2:00 PM CDT   CT CHEST ABDOMEN PELVIS W/O & W CONTRAST with UCCT1   [facility] ([facility])    [address] [place] [address]    [phone]           Please bring any scans or X-rays taken at other hospitals, if similar tests were done. Also bring a list of your medicines, including vitamins, minerals and over-the-counter drugs. It is safest to leave personal items at home.  Be sure to tell your doctor:   If you have any allergies.   If there s any chance you are pregnant.   If you are breastfeeding.  How to prepare:   Do not eat or drink for 2 hours before your exam. If you need to take medicine, you may take it with small sips of water. (We may ask you to take liquid medicine as well.)   Please wear loose clothing, such as a sweat suit or jogging clothes. Avoid snaps, zippers and other metal. We may ask you to undress and put on a hospital gown.  Please arrive 30 minutes early for your CT. Once in the department you might be asked to drink water 15-20 minutes prior to your exam.  If indicated you may be asked to drink an oral contrast in advance of your CT.  If this is the case, the imaging team will let you know or be in contact with you prior to your appointment  Patients over 70 or patients with diabetes or kidney problems:   If you haven t had a blood test (creatinine test) within the last 30 days, the Cardiologist/Radiologist may require you to get this test prior to your exam.  If you have diabetes:   Continue to take your metformin medication on the day of your exam  If you have any questions, please call the Imaging Department where you will have your exam.            Aug 27, 2018  2:30 PM CDT   (Arrive by 2:15 PM)   PAC EVALUATION with SAVANNA James CNP   [facility] [place] ([facility])    [address] [place] [id number] [phone]            Aug 27, 2018  3:30 PM CDT   (Arrive by 3:15 PM)   PAC RN ASSESSMENT with Dex Pac Rn   [facility] ([facility])    [address] [place] [address]  [facility] [address] [phone]            Aug 27, 2018  4:00 PM CDT   (Arrive by 3:45 PM)   PAC Anesthesia Consult with  Pac Anesthesiologist   [facility] ([facility])    [address] [facility] [address] [phone]            Oct 29, 2018  1:45 PM CDT   Return Visit with Padilla Last MD   [facility] ([facility])    [address] [place] [id number] [phone]              Future tests that were ordered for you today     Open Future Orders        Priority Expected Expires Ordered    CT Chest Abdomen Pelvis w/o & w Contrast Routine  7/30/2019 7/30/2018    Comprehensive metabolic panel Routine  10/28/2018 7/30/2018    CEA Routine  10/28/2018 7/30/2018    Prealbumin Routine  10/28/2018 7/30/2018    MRI Abdomen w & w/o contrast Routine  10/28/2018 7/30/2018    CT Chest w Contrast Routine  7/30/2019 7/30/2018            Who to contact     Please call your clinic at [phone] to:    Ask questions about your health    Make or cancel appointments    Discuss your medicines    Learn about your test results    Speak to your doctor            Additional Information About Your Visit        [employer] Information     [employer] gives you secure access to your electronic health record. If you see a primary care provider, you can also send messages to your care team and make appointments. If you have questions, please call your primary care clinic.  If you do not have a primary care provider, please call [phone] and they will assist you.      [employer] is an electronic gateway that provides easy, online access to your medical records. With [employer], you can request a clinic appointment, read your test results, renew a prescription or communicate with your care team.     To access your existing account, please contact your [facility] or call  "[phone] for assistance.        Care EveryWhere ID     This is your Care EveryWhere ID. This could be used by other organizations to access your [facility] medical records  [id number]        Your Vitals Were     Pulse Temperature Height Pulse Oximetry BMI (Body Mass Index)       82 98.5  F (36.9  C) (Oral) 5' 8\" 95% 25.18 kg/m2        Blood Pressure from Last 3 Encounters:   07/30/18 128/77   07/30/18 136/79   07/02/18 115/74    Weight from Last 3 Encounters:   07/30/18 165 lb 9.6 oz   07/30/18 166 lb   07/02/18 167 lb              We Performed the Following     ABO/Rh type and screen     CBC with Diff/Plt ([facility])     GASTROENTEROLOGY ADULT REF PROCEDURE ONLY     PAC Visit Referral (For [facility] Only)     Yulia-Operative Worksheet          Today's Medication Changes          These changes are accurate as of 7/30/18  3:25 PM.  If you have any questions, ask your nurse or doctor.               Start taking these medicines.        Dose/Directions    * magnesium citrate solution   Used for:  Cancer of sigmoid colon (H)   Started by:  Herminio Oswald MD        Dose:  296 mL   Take 296 mLs by mouth See Admin Instructions Refer to surgery handout.Refer to \"Getting ready for Surgery\" packet for instructions.   Quantity:  296 mL   Refills:  0       * magnesium citrate solution   Used for:  Cancer of sigmoid colon (H)   Started by:  Herminio Oswald MD        Dose:  296 mL   Take 296 mLs by mouth See Admin Instructions Refer to \"Getting ready for Colonoscopy\" packet for instructions.   Quantity:  296 mL   Refills:  0       metroNIDAZOLE 500 MG tablet   Commonly known as:  FLAGYL   Used for:  Cancer of sigmoid colon (H)   Started by:  Herminio Oswald MD        Dose:  500 mg   Take 1 tablet (500 mg) by mouth every 8 hours for 1 day prior to surgery.  Take in conjunction with Neomycin Sulfate.   Quantity:  3 tablet   Refills:  0       neomycin 500 MG tablet   Commonly known as:  MYCIFRADIN   Used " "for:  Cancer of sigmoid colon (H)   Started by:  Herminio Oswald MD        Dose:  1000 mg   Take 2 tablets (1,000 mg) by mouth every 8 hours for 1 day prior to surgery.  Take in conjunction with Flagyl.   Quantity:  6 tablet   Refills:  0       * polyethylene glycol powder   Commonly known as:  MIRALAX/GLYCOLAX   Used for:  Cancer of sigmoid colon (H)   Started by:  Herminio Oswald MD        Refer to \"Getting ready for Surgery\" packet for instructions.   Quantity:  238 g   Refills:  0       * polyethylene glycol powder   Commonly known as:  MIRALAX/GLYCOLAX   Used for:  Cancer of sigmoid colon (H)   Started by:  Herminio Oswald MD        Refer to \"Getting ready for Colonoscopy\" packet for instructions.   Quantity:  238 g   Refills:  0       * Notice:  This list has 4 medication(s) that are the same as other medications prescribed for you. Read the directions carefully, and ask your doctor or other care provider to review them with you.      These medicines have changed or have updated prescriptions.        Dose/Directions    * ondansetron 8 MG tablet   Commonly known as:  ZOFRAN   This may have changed:  Another medication with the same name was added. Make sure you understand how and when to take each.   Used for:  Adenocarcinoma of sigmoid colon (H)   Changed by:  Herminio Oswald MD        Dose:  8 mg   Take 1 tablet (8 mg) by mouth every 8 hours as needed (Nausea/Vomiting)   Quantity:  30 tablet   Refills:  1       * ondansetron 4 MG tablet   Commonly known as:  ZOFRAN   This may have changed:  You were already taking a medication with the same name, and this prescription was added. Make sure you understand how and when to take each.   Used for:  Cancer of sigmoid colon (H)   Changed by:  Herminio Oswald MD        Dose:  4 mg   Take 1 tablet (4 mg) by mouth every 6 hours as needed for nausea While taking neomycin and flagyl.   Quantity:  3 tablet   Refills:  0    "    * Notice:  This list has 2 medication(s) that are the same as other medications prescribed for you. Read the directions carefully, and ask your doctor or other care provider to review them with you.         Where to get your medicines      These medications were sent to [facility] [place] - [place], [place] - [address]  [address], [place] [address]     Phone:  [phone]     magnesium citrate solution    magnesium citrate solution    metroNIDAZOLE 500 MG tablet    neomycin 500 MG tablet    ondansetron 4 MG tablet    polyethylene glycol powder    polyethylene glycol powder                Primary Care Provider Office Phone # Fax #    Moreno Harris [phone]       [address]  [place] [address]        Equal Access to Services     GUSTAVO FLANAGAN : Hadii aad ku hadasho Sokaleyali, waaxda luqadaha, qaybta kaalmada adeegyada, waxay rosey leon . So [facility] [place] [facility] [phone].    ATENCIÓN: Si habla español, tiene a matrino disposición servicios gratuitos de asistencia lingüística. [facility] [phone].    We comply with applicable federal civil rights laws and [place] laws. We do not discriminate on the basis of race, color, national origin, age, disability, sex, sexual orientation, or gender identity.            Thank you!     Thank you for choosing [facility] COLON AND RECTAL SURGERY  for your care. Our goal is always to provide you with excellent care. Hearing back from our patients is one way we can continue to improve our services. Please take a few minutes to complete the written survey that you may receive in the mail after your visit with us. Thank you!             Your Updated Medication List - Protect others around you: Learn how to safely use, store and throw away your medicines at www.disposemymeds.org.          This list is accurate as of 7/30/18  3:25 PM.  Always use your most recent med list.                   Brand Name Dispense  "Instructions for use Diagnosis    docusate sodium 100 MG tablet    COLACE    60 tablet    Take 100 mg by mouth daily    Special screening for malignant neoplasms, colon       LORazepam 0.5 MG tablet    ATIVAN    30 tablet    Take 1 tablet (0.5 mg) by mouth every 4 hours as needed (Anxiety, Nausea/Vomiting or Sleep)    Adenocarcinoma of sigmoid colon (H)       * magnesium citrate solution     296 mL    Take 296 mLs by mouth See Admin Instructions Refer to surgery handout.Refer to \"Getting ready for Surgery\" packet for instructions.    Cancer of sigmoid colon (H)       * magnesium citrate solution     296 mL    Take 296 mLs by mouth See Admin Instructions Refer to \"Getting ready for Colonoscopy\" packet for instructions.    Cancer of sigmoid colon (H)       metroNIDAZOLE 500 MG tablet    FLAGYL    3 tablet    Take 1 tablet (500 mg) by mouth every 8 hours for 1 day prior to surgery.  Take in conjunction with Neomycin Sulfate.    Cancer of sigmoid colon (H)       neomycin 500 MG tablet    MYCIFRADIN    6 tablet    Take 2 tablets (1,000 mg) by mouth every 8 hours for 1 day prior to surgery.  Take in conjunction with Flagyl.    Cancer of sigmoid colon (H)       * ondansetron 8 MG tablet    ZOFRAN    30 tablet    Take 1 tablet (8 mg) by mouth every 8 hours as needed (Nausea/Vomiting)    Adenocarcinoma of sigmoid colon (H)       * ondansetron 4 MG tablet    ZOFRAN    3 tablet    Take 1 tablet (4 mg) by mouth every 6 hours as needed for nausea While taking neomycin and flagyl.    Cancer of sigmoid colon (H)       * polyethylene glycol powder    MIRALAX/GLYCOLAX    238 g    Refer to \"Getting ready for Surgery\" packet for instructions.    Cancer of sigmoid colon (H)       * polyethylene glycol powder    MIRALAX/GLYCOLAX    238 g    Refer to \"Getting ready for Colonoscopy\" packet for instructions.    Cancer of sigmoid colon (H)       prochlorperazine 10 MG tablet    COMPAZINE    30 tablet    Take 1 tablet (10 mg) by mouth every " 6 hours as needed (Nausea/Vomiting)    Adenocarcinoma of sigmoid colon (H)       * Notice:  This list has 6 medication(s) that are the same as other medications prescribed for you. Read the directions carefully, and ask your doctor or other care provider to review them with you.       Initial (On Arrival)

## 2021-05-29 NOTE — ED PROVIDER NOTE - PATIENT PORTAL LINK FT
You can access the FollowMyHealth Patient Portal offered by [facility] by registering at the following website: [email or web address]. By joining [employer]’s FollowMyHealth portal, you will also be able to view your health information using other applications (apps) compatible with our system.

## 2021-05-29 NOTE — ED ADULT NURSE NOTE - PSH
Bartholin cyst  in november, had drainage at [facility]  History of repair of ACL  right

## 2021-05-29 NOTE — ED PROVIDER NOTE - NSFOLLOWUPINSTRUCTIONS_ED_ALL_ED_FT
Follow Up in 1-3 Days with your own doctor or with  [address] [address]  Phone: [phone]      Panic Attack    WHAT YOU NEED TO KNOW:    A panic attack usually lasts 10 to 20 minutes and feels more serious than it is. Remind yourself that you are not in danger and that the attack will stop soon. Deep breathing can help stop a panic attack or keep it from becoming severe.    Heart Attack vs Panic Attack         DISCHARGE INSTRUCTIONS:    Call your local emergency number (911 in the ) if:   •You have any of the following signs of a heart attack: ?Squeezing, pressure, or pain in your chest      ?You may also have any of the following: ?Discomfort or pain in your back, neck, jaw, stomach, or arm      ?Shortness of breath      ?Nausea or vomiting      ?Lightheadedness or a sudden cold sweat            Call your doctor or therapist if:   •You have new or worsening panic attacks after treatment.      •You have questions or concerns about your condition or care.      Medicines:   •Medicines may be given to make you feel more relaxed or to reduce anxiety that causes a panic attack. Some medicines are taken only when you are having a panic attack. Other medicines can be taken to prevent panic attacks.      •Take your medicine as directed. Contact your healthcare provider if you think your medicine is not helping or if you have side effects. Tell him of her if you are allergic to any medicine. Keep a list of the medicines, vitamins, and herbs you take. Include the amounts, and when and why you take them. Bring the list or the pill bottles to follow-up visits. Carry your medicine list with you in case of an emergency.      Manage or prevent a panic attack:   •Manage stress. Stress can trigger a panic attack. Ways to lower your stress level include yoga, meditation, and talking to someone about the stress in your life.      •Exercise as directed. Exercise can reduce stress and help you sleep better. Try to get at least 30 minutes of physical activity on most days of the week. Your healthcare provider can help you create an exercise plan.  Walking for Exercise           •Set a sleep schedule. Too little sleep can increase anxiety. Go to bed at the same time each night and wake up at the same time each morning. Keep your room quiet and free from distractions, such as a television or computer.      •Eat a variety of healthy foods. Healthy foods include fruits, vegetables, low-fat dairy products, lean meats, fish, and beans. Limit sugar. Sugar can increase your symptoms.  Healthy Foods           •Do not have foods or drinks that contain caffeine. These include coffee, tea, soda, energy drinks, and chocolate. Caffeine can make anxiety worse or trigger a panic attack.      •Limit alcohol. You may think alcohol makes you calmer, but it is not a safe or effective way to control anxiety. Alcohol can increase anxiety if you drink large amounts or drink often. Ask your healthcare provider how much alcohol is safe for you to drink. A drink of alcohol is 12 ounces of beer, 5 ounces of wine, or 1½ ounces of liquor.      •Do not smoke. Nicotine and other chemicals in cigarettes and cigars can increase anxiety. Ask your healthcare provider for information if you currently smoke and need help to quit. E-cigarettes or smokeless tobacco still contain nicotine. Talk to your healthcare provider before you use these products.      Follow up with your doctor or therapist as directed: Write down your questions so you remember to ask them during your visits.

## 2021-05-29 NOTE — ED PROVIDER NOTE - PSH
Bartholin cyst  in november, had drainage at [facility]  History of repair of ACL  right

## 2021-06-06 ENCOUNTER — EMERGENCY (EMERGENCY)
Facility: HOSPITAL | Age: 36
LOS: 1 days | Discharge: HOME | End: 2021-06-06
Attending: EMERGENCY MEDICINE | Admitting: EMERGENCY MEDICINE
Payer: MEDICAID

## 2021-06-06 VITALS
OXYGEN SATURATION: 98 % | DIASTOLIC BLOOD PRESSURE: 76 MMHG | RESPIRATION RATE: 19 BRPM | HEART RATE: 94 BPM | SYSTOLIC BLOOD PRESSURE: 127 MMHG

## 2021-06-06 VITALS
HEART RATE: 102 BPM | OXYGEN SATURATION: 96 % | TEMPERATURE: 98 F | RESPIRATION RATE: 17 BRPM | WEIGHT: 165.35 LBS | HEIGHT: 62 IN | DIASTOLIC BLOOD PRESSURE: 75 MMHG | SYSTOLIC BLOOD PRESSURE: 151 MMHG

## 2021-06-06 DIAGNOSIS — N75.0 CYST OF BARTHOLIN'S GLAND: Chronic | ICD-10-CM

## 2021-06-06 DIAGNOSIS — Z98.890 OTHER SPECIFIED POSTPROCEDURAL STATES: Chronic | ICD-10-CM

## 2021-06-06 LAB
ALBUMIN SERPL ELPH-MCNC: 3.9 G/DL — SIGNIFICANT CHANGE UP (ref 3.3–5)
ALP SERPL-CCNC: 57 U/L — SIGNIFICANT CHANGE UP (ref 40–120)
ALT FLD-CCNC: 32 U/L — SIGNIFICANT CHANGE UP (ref 10–45)
ANION GAP SERPL CALC-SCNC: 16 MMOL/L — SIGNIFICANT CHANGE UP (ref 5–17)
APAP SERPL-MCNC: <1 UG/ML — LOW (ref 10–30)
APTT BLD: 30 SEC — SIGNIFICANT CHANGE UP (ref 27.5–35.5)
AST SERPL-CCNC: 27 U/L — SIGNIFICANT CHANGE UP (ref 10–40)
BASOPHILS # BLD AUTO: 0.04 K/UL — SIGNIFICANT CHANGE UP (ref 0–0.2)
BASOPHILS NFR BLD AUTO: 0.4 % — SIGNIFICANT CHANGE UP (ref 0–2)
BILIRUB SERPL-MCNC: 0.3 MG/DL — SIGNIFICANT CHANGE UP (ref 0.2–1.2)
BUN SERPL-MCNC: 12 MG/DL — SIGNIFICANT CHANGE UP (ref 7–23)
CALCIUM SERPL-MCNC: 9 MG/DL — SIGNIFICANT CHANGE UP (ref 8.4–10.5)
CHLORIDE SERPL-SCNC: 105 MMOL/L — SIGNIFICANT CHANGE UP (ref 96–108)
CO2 BLDV-SCNC: 21 MMOL/L — SIGNIFICANT CHANGE UP (ref 21–29)
CO2 SERPL-SCNC: 21 MMOL/L — LOW (ref 22–31)
CREAT SERPL-MCNC: 0.95 MG/DL — SIGNIFICANT CHANGE UP (ref 0.5–1.3)
D DIMER BLD IA.RAPID-MCNC: 163 NG/ML DDU — SIGNIFICANT CHANGE UP
EOSINOPHIL # BLD AUTO: 0.09 K/UL — SIGNIFICANT CHANGE UP (ref 0–0.5)
EOSINOPHIL NFR BLD AUTO: 0.9 % — SIGNIFICANT CHANGE UP (ref 0–6)
ETHANOL SERPL-MCNC: 71 MG/DL — HIGH (ref 0–3)
GLUCOSE SERPL-MCNC: 91 MG/DL — SIGNIFICANT CHANGE UP (ref 70–99)
HCG SERPL-ACNC: <1 MIU/ML — SIGNIFICANT CHANGE UP
HCT VFR BLD CALC: 35.7 % — SIGNIFICANT CHANGE UP (ref 34.5–45)
HGB BLD-MCNC: 11.9 G/DL — SIGNIFICANT CHANGE UP (ref 11.5–15.5)
IMM GRANULOCYTES NFR BLD AUTO: 0.3 % — SIGNIFICANT CHANGE UP (ref 0–1.5)
INR BLD: 1.03 RATIO — SIGNIFICANT CHANGE UP (ref 0.88–1.16)
LYMPHOCYTES # BLD AUTO: 3.64 K/UL — HIGH (ref 1–3.3)
LYMPHOCYTES # BLD AUTO: 36.8 % — SIGNIFICANT CHANGE UP (ref 13–44)
MCHC RBC-ENTMCNC: 31 PG — SIGNIFICANT CHANGE UP (ref 27–34)
MCHC RBC-ENTMCNC: 33.3 GM/DL — SIGNIFICANT CHANGE UP (ref 32–36)
MCV RBC AUTO: 93 FL — SIGNIFICANT CHANGE UP (ref 80–100)
MONOCYTES # BLD AUTO: 0.97 K/UL — HIGH (ref 0–0.9)
MONOCYTES NFR BLD AUTO: 9.8 % — SIGNIFICANT CHANGE UP (ref 2–14)
NEUTROPHILS # BLD AUTO: 5.13 K/UL — SIGNIFICANT CHANGE UP (ref 1.8–7.4)
NEUTROPHILS NFR BLD AUTO: 51.8 % — SIGNIFICANT CHANGE UP (ref 43–77)
NRBC # BLD: 0 /100 WBCS — SIGNIFICANT CHANGE UP (ref 0–0)
PCO2 BLDV: 34 MMHG — LOW (ref 35–50)
PH BLDV: 7.38 — SIGNIFICANT CHANGE UP (ref 7.35–7.45)
PLATELET # BLD AUTO: 222 K/UL — SIGNIFICANT CHANGE UP (ref 150–400)
PO2 BLDV: 85 MMHG — HIGH (ref 25–45)
POTASSIUM SERPL-MCNC: 3.5 MMOL/L — SIGNIFICANT CHANGE UP (ref 3.5–5.3)
POTASSIUM SERPL-SCNC: 3.5 MMOL/L — SIGNIFICANT CHANGE UP (ref 3.5–5.3)
PROT SERPL-MCNC: 8 G/DL — SIGNIFICANT CHANGE UP (ref 6–8.3)
PROTHROM AB SERPL-ACNC: 12.4 SEC — SIGNIFICANT CHANGE UP (ref 10.6–13.6)
RBC # BLD: 3.84 M/UL — SIGNIFICANT CHANGE UP (ref 3.8–5.2)
RBC # FLD: 12.8 % — SIGNIFICANT CHANGE UP (ref 10.3–14.5)
SALICYLATES SERPL-MCNC: 2.1 MG/DL — LOW (ref 3–30)
SAO2 % BLDV: 95 % — HIGH (ref 67–88)
SODIUM SERPL-SCNC: 142 MMOL/L — SIGNIFICANT CHANGE UP (ref 135–145)
TROPONIN I SERPL-MCNC: <.017 NG/ML — LOW (ref 0.02–0.06)
WBC # BLD: 9.9 K/UL — SIGNIFICANT CHANGE UP (ref 3.8–10.5)
WBC # FLD AUTO: 9.9 K/UL — SIGNIFICANT CHANGE UP (ref 3.8–10.5)

## 2021-06-06 PROCEDURE — 90471 IMMUNIZATION ADMIN: CPT

## 2021-06-06 PROCEDURE — 70498 CT ANGIOGRAPHY NECK: CPT | Mod: 26,MA

## 2021-06-06 PROCEDURE — 36415 COLL VENOUS BLD VENIPUNCTURE: CPT

## 2021-06-06 PROCEDURE — 93010 ELECTROCARDIOGRAM REPORT: CPT

## 2021-06-06 PROCEDURE — 85025 COMPLETE CBC W/AUTO DIFF WBC: CPT

## 2021-06-06 PROCEDURE — 90715 TDAP VACCINE 7 YRS/> IM: CPT

## 2021-06-06 PROCEDURE — 84484 ASSAY OF TROPONIN QUANT: CPT

## 2021-06-06 PROCEDURE — 96374 THER/PROPH/DIAG INJ IV PUSH: CPT

## 2021-06-06 PROCEDURE — 85730 THROMBOPLASTIN TIME PARTIAL: CPT

## 2021-06-06 PROCEDURE — 84702 CHORIONIC GONADOTROPIN TEST: CPT

## 2021-06-06 PROCEDURE — 80053 COMPREHEN METABOLIC PANEL: CPT

## 2021-06-06 PROCEDURE — 99291 CRITICAL CARE FIRST HOUR: CPT

## 2021-06-06 PROCEDURE — 70496 CT ANGIOGRAPHY HEAD: CPT

## 2021-06-06 PROCEDURE — 99284 EMERGENCY DEPT VISIT MOD MDM: CPT | Mod: 25

## 2021-06-06 PROCEDURE — 80307 DRUG TEST PRSMV CHEM ANLYZR: CPT

## 2021-06-06 PROCEDURE — 71045 X-RAY EXAM CHEST 1 VIEW: CPT

## 2021-06-06 PROCEDURE — 70498 CT ANGIOGRAPHY NECK: CPT

## 2021-06-06 PROCEDURE — 96361 HYDRATE IV INFUSION ADD-ON: CPT

## 2021-06-06 PROCEDURE — 85379 FIBRIN DEGRADATION QUANT: CPT

## 2021-06-06 PROCEDURE — 93005 ELECTROCARDIOGRAM TRACING: CPT

## 2021-06-06 PROCEDURE — 70450 CT HEAD/BRAIN W/O DYE: CPT | Mod: 26,59,MA

## 2021-06-06 PROCEDURE — 71045 X-RAY EXAM CHEST 1 VIEW: CPT | Mod: 26

## 2021-06-06 PROCEDURE — 70450 CT HEAD/BRAIN W/O DYE: CPT

## 2021-06-06 PROCEDURE — 85610 PROTHROMBIN TIME: CPT

## 2021-06-06 PROCEDURE — 82803 BLOOD GASES ANY COMBINATION: CPT

## 2021-06-06 PROCEDURE — 70496 CT ANGIOGRAPHY HEAD: CPT | Mod: 26,MA

## 2021-06-06 RX ORDER — KETOROLAC TROMETHAMINE 30 MG/ML
30 SYRINGE (ML) INJECTION ONCE
Refills: 0 | Status: DISCONTINUED | OUTPATIENT
Start: 2021-06-06 | End: 2021-06-06

## 2021-06-06 RX ORDER — TETANUS TOXOID, REDUCED DIPHTHERIA TOXOID AND ACELLULAR PERTUSSIS VACCINE, ADSORBED 5; 2.5; 8; 8; 2.5 [IU]/.5ML; [IU]/.5ML; UG/.5ML; UG/.5ML; UG/.5ML
0.5 SUSPENSION INTRAMUSCULAR ONCE
Refills: 0 | Status: COMPLETED | OUTPATIENT
Start: 2021-06-06 | End: 2021-06-06

## 2021-06-06 RX ORDER — SODIUM CHLORIDE 9 MG/ML
1000 INJECTION INTRAMUSCULAR; INTRAVENOUS; SUBCUTANEOUS ONCE
Refills: 0 | Status: COMPLETED | OUTPATIENT
Start: 2021-06-06 | End: 2021-06-06

## 2021-06-06 RX ORDER — ACETAMINOPHEN 500 MG
975 TABLET ORAL ONCE
Refills: 0 | Status: COMPLETED | OUTPATIENT
Start: 2021-06-06 | End: 2021-06-06

## 2021-06-06 RX ORDER — ONDANSETRON 8 MG/1
4 TABLET, FILM COATED ORAL ONCE
Refills: 0 | Status: COMPLETED | OUTPATIENT
Start: 2021-06-06 | End: 2021-06-06

## 2021-06-06 RX ADMIN — SODIUM CHLORIDE 1000 MILLILITER(S): 9 INJECTION INTRAMUSCULAR; INTRAVENOUS; SUBCUTANEOUS at 19:00

## 2021-06-06 RX ADMIN — SODIUM CHLORIDE 1000 MILLILITER(S): 9 INJECTION INTRAMUSCULAR; INTRAVENOUS; SUBCUTANEOUS at 20:00

## 2021-06-06 RX ADMIN — Medication 975 MILLIGRAM(S): at 20:15

## 2021-06-06 RX ADMIN — TETANUS TOXOID, REDUCED DIPHTHERIA TOXOID AND ACELLULAR PERTUSSIS VACCINE, ADSORBED 0.5 MILLILITER(S): 5; 2.5; 8; 8; 2.5 SUSPENSION INTRAMUSCULAR at 19:08

## 2021-06-06 RX ADMIN — ONDANSETRON 4 MILLIGRAM(S): 8 TABLET, FILM COATED ORAL at 19:08

## 2021-06-06 RX ADMIN — Medication 975 MILLIGRAM(S): at 19:39

## 2021-06-06 NOTE — ED PROVIDER NOTE - PATIENT PORTAL LINK FT
You can access the FollowMyHealth Patient Portal offered by [facility] by registering at the following website: [email or web address]. By joining [employer]’s FollowMyHealth portal, you will also be able to view your health information using other applications (apps) compatible with our system.

## 2021-06-06 NOTE — ED PROVIDER NOTE - PHYSICAL EXAMINATION
pt initially drowsy but arousable and not following any commands but after CT scans pt reexamined and exam documented below.

## 2021-06-06 NOTE — ED ADULT TRIAGE NOTE - AS TEMP SITE
Date & Time: 2/18/2025, 6:32 PM  Patient: Dominic Wahl  Encounter Provider(s):    Beatriz Parham APRN       To Whom It May Concern:    Dominic Wahl was seen and treated in our department on 2/18/2025. He can return to school with these limitations: Please allow alternative assignments in shop class till March 3, 2025 unless otherwise specified by orthopedics. .    If you have any questions or concerns, please do not hesitate to call.        _____________________________  Physician/APC Signature           
  Date & Time: 2/18/2025, 6:32 PM  Patient: Dominic Wahl  Encounter Provider(s):    Beatriz Parham APRN       To Whom It May Concern:    Dominic Wahl was seen and treated in our department on 2/18/2025. 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 .    If you have any questions or concerns, please do not hesitate to call.        _____________________________  Physician/APC Signature           
forehead

## 2021-06-06 NOTE — ED ADULT NURSE NOTE - PSH
Bartholin cyst  in november, had drainage at [facility]  History of repair of ACL  right
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
## 2021-06-06 NOTE — ED ADULT NURSE NOTE - NSIMPLEMENTINTERV_GEN_ALL_ED
Implemented All Fall Risk Interventions:  [place] to call system. Call bell, personal items and telephone within reach. Instruct patient to call for assistance. Room bathroom lighting operational. Non-slip footwear when patient is off stretcher. Physically safe environment: no spills, clutter or unnecessary equipment. Stretcher in lowest position, wheels locked, appropriate side rails in place. Provide visual cue, wrist band, yellow gown, etc. Monitor gait and stability. Monitor for mental status changes and reorient to person, place, and time. Review medications for side effects contributing to fall risk. Reinforce activity limits and safety measures with patient and family.
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
## 2021-06-06 NOTE — ED PROVIDER NOTE - DOMESTIC TRAVEL HIGH RISK QUESTION
Effective handoff received from Fabrizio Reid RN in [employer]. Will continue to observe and monitor. No

## 2021-06-06 NOTE — ED PROVIDER NOTE - CRITICAL CARE ATTENDING CONTRIBUTION TO CARE
Patient was critically ill with a high probability of imminent or life threatening deterioration.  I have performed direct patient care (not related to procedure), additional history taking, interpretation of diagnostic studies, documentation, consultation with other physicians, telephone consultation with the patient's family  I have personally and independently provided the amount of critical care time documented below excluding time spent on separate procedures.  40 mins    Dr. Britton: I performed a face to face bedside interview with patient regarding history of present illness, review of symptoms and past medical history. I completed an independent physical exam.  I have discussed patient's plan of care with PA.   I agree with note as stated above, having amended the EMR as needed to reflect my findings.   This includes HISTORY OF PRESENT ILLNESS, HIV, PAST MEDICAL/SURGICAL/FAMILY/SOCIAL HISTORY, ALLERGIES AND HOME MEDICATIONS, REVIEW OF SYSTEMS, PHYSICAL EXAM, and any PROGRESS NOTES during the time I functioned as the attending physician for this patient.

## 2021-06-06 NOTE — ED PROVIDER NOTE - OBJECTIVE STATEMENT
pt 35y f brought in by her friend for syncopal episode PTA. pts friend states that she was standing over the bbq while she was outside in the sun right before she syncopized. pts friend states that she drank wine and was c/o headache all day. has a hx HTN. was able to stand to walk to to the wheel-chair from the car but not following commands in ED  as per previous sunrise note pt was seen 1 week ago in ed for panic attack

## 2021-06-06 NOTE — ED ADULT NURSE NOTE - OBJECTIVE STATEMENT
pt brought in by her friend from home for syncopal episode PTA. pts friend states that she was standing over the bbq while she was outside in the sun right before she syncopized. pts friend states that she drank wine and was c/o headache all day. pt friend reports when pt had syncopal episodes, she fell and hit her head on the corner of the bed frame. pt presents with laceration and hematoma to forehead on arrival. Bleeding controlled. pt has a hx HTN. was able to stand to walk to to the wheel-chair from the car and now following all commands. no neuro deficits noted. pt brought in by her friend from home for syncopal episode PTA. pts friend states that she was standing over the bbq while she was outside in the sun right before she syncopized. pts friend states that she drank wine and was c/o headache all day. pt friend reports when pt had syncopal episodes, she fell and hit her head on the corner of the bed frame. pt presents with laceration and hematoma to forehead on arrival. Bleeding controlled. pt has a hx HTN. was able to stand to walk to to the wheel-chair from the car and now following all commands. no neuro deficits noted. pt denies taking any blood thinners. pt brought in by her friend from home for syncopal episode PTA. pt presents as lethargic, not following commands. pts friend states that she was standing over the bbq while she was outside in the sun right before she syncopized. pts friend states that she drank wine and was c/o headache all day. pt friend reports when pt had syncopal episodes, she fell and hit her head on the corner of the bed frame. pt presents with laceration and hematoma to forehead on arrival. Bleeding controlled. pt has a hx HTN. was able to stand to walk to the wheel-chair from the car. pt then about 15 minutes after arriving became more alert, following all commands. no neuro deficits noted. pt denies taking any blood thinners.

## 2021-06-07 ENCOUNTER — APPOINTMENT (OUTPATIENT)
Dept: FAMILY MEDICINE | Facility: HOSPITAL | Age: 36
End: 2021-06-07

## 2021-06-08 ENCOUNTER — APPOINTMENT (OUTPATIENT)
Dept: FAMILY MEDICINE | Facility: CLINIC | Age: 36
End: 2021-06-08

## 2021-06-11 LAB — DRUG SCREEN, SERUM: SIGNIFICANT CHANGE UP

## 2021-07-05 ENCOUNTER — TRANSCRIPTION ENCOUNTER (OUTPATIENT)
Age: 36
End: 2021-07-05

## 2021-07-07 ENCOUNTER — NON-APPOINTMENT (OUTPATIENT)
Age: 36
End: 2021-07-07

## 2021-07-12 ENCOUNTER — NON-APPOINTMENT (OUTPATIENT)
Age: 36
End: 2021-07-12

## 2021-07-20 ENCOUNTER — NON-APPOINTMENT (OUTPATIENT)
Age: 36
End: 2021-07-20

## 2021-07-20 PROBLEM — I10 ESSENTIAL (PRIMARY) HYPERTENSION: Chronic | Status: ACTIVE | Noted: 2021-06-06

## 2021-07-21 ENCOUNTER — APPOINTMENT (OUTPATIENT)
Dept: FAMILY MEDICINE | Facility: HOSPITAL | Age: 36
End: 2021-07-21

## 2021-07-22 ENCOUNTER — NON-APPOINTMENT (OUTPATIENT)
Age: 36
End: 2021-07-22

## 2021-09-10 ENCOUNTER — APPOINTMENT (OUTPATIENT)
Dept: ORTHOPEDIC SURGERY | Facility: CLINIC | Age: 36
End: 2021-09-10

## 2021-09-15 ENCOUNTER — NON-APPOINTMENT (OUTPATIENT)
Age: 36
End: 2021-09-15

## 2021-09-23 ENCOUNTER — OUTPATIENT (OUTPATIENT)
Dept: OUTPATIENT SERVICES | Facility: HOSPITAL | Age: 36
LOS: 1 days | End: 2021-09-23
Payer: MEDICAID

## 2021-09-23 ENCOUNTER — APPOINTMENT (OUTPATIENT)
Dept: FAMILY MEDICINE | Facility: HOSPITAL | Age: 36
End: 2021-09-23

## 2021-09-23 VITALS
RESPIRATION RATE: 16 BRPM | HEART RATE: 92 BPM | TEMPERATURE: 97.6 F | HEIGHT: 61 IN | DIASTOLIC BLOOD PRESSURE: 85 MMHG | SYSTOLIC BLOOD PRESSURE: 127 MMHG | BODY MASS INDEX: 32.1 KG/M2 | OXYGEN SATURATION: 95 % | WEIGHT: 170 LBS

## 2021-09-23 DIAGNOSIS — Z98.890 OTHER SPECIFIED POSTPROCEDURAL STATES: Chronic | ICD-10-CM

## 2021-09-23 DIAGNOSIS — N75.0 CYST OF BARTHOLIN'S GLAND: Chronic | ICD-10-CM

## 2021-09-23 DIAGNOSIS — Z00.00 ENCOUNTER FOR GENERAL ADULT MEDICAL EXAMINATION WITHOUT ABNORMAL FINDINGS: ICD-10-CM

## 2021-09-23 DIAGNOSIS — F41.1 GENERALIZED ANXIETY DISORDER: ICD-10-CM

## 2021-09-23 PROCEDURE — 87800 DETECT AGNT MULT DNA DIREC: CPT

## 2021-09-23 PROCEDURE — 83036 HEMOGLOBIN GLYCOSYLATED A1C: CPT

## 2021-09-23 PROCEDURE — 36415 COLL VENOUS BLD VENIPUNCTURE: CPT

## 2021-09-23 PROCEDURE — 87624 HPV HI-RISK TYP POOLED RSLT: CPT

## 2021-09-23 PROCEDURE — G0463: CPT

## 2021-09-23 PROCEDURE — 85025 COMPLETE CBC W/AUTO DIFF WBC: CPT

## 2021-09-23 PROCEDURE — 80053 COMPREHEN METABOLIC PANEL: CPT

## 2021-09-23 PROCEDURE — 80061 LIPID PANEL: CPT

## 2021-09-23 RX ORDER — CEPHALEXIN 500 MG/1
500 TABLET ORAL
Qty: 28 | Refills: 0 | Status: DISCONTINUED | COMMUNITY
Start: 2021-03-31 | End: 2021-09-23

## 2021-09-23 RX ORDER — NAPROXEN 500 MG/1
500 TABLET ORAL
Qty: 60 | Refills: 0 | Status: DISCONTINUED | COMMUNITY
Start: 2021-04-27 | End: 2021-09-23

## 2021-09-23 NOTE — REVIEW OF SYSTEMS
[Vaginal Discharge] : vaginal discharge [Negative] : Heme/Lymph [Dysuria] : no dysuria [Incontinence] : no incontinence [Nocturia] : no nocturia [Hematuria] : no hematuria [Frequency] : no frequency [FreeTextEntry8] : +Vaginal itchiness

## 2021-09-23 NOTE — HISTORY OF PRESENT ILLNESS
[FreeTextEntry1] : CPE and well woman exam [de-identified] : 36 yo Female with PMHx of anxiety and depression presents today to the clinic for a CPE and well woman exam.  Patient mentions she has been having some vaginal itchiness and will like to get tested.  She mentions her Aunt and cousin passed away of Cervical cancer and she is worried she might have something similar.  Patient denies any headaches, SOB, chest pain, dizziness, lightheadedness or urinary symptoms.

## 2021-09-23 NOTE — PHYSICAL EXAM
[Normal] : no rash [FreeTextEntry1] : Normal appearing external female genitalia, normal vaginal epithelium, +Abnormal discharge, normal appearing cervix.  Bimanual: Non tender, no palpable adnexa

## 2021-09-23 NOTE — PLAN
[FreeTextEntry1] : 34 yo Female with PMHx of anxiety and depression presents today to the clinic for a CPE and well woman exam.  Patient mentions she has been having some vaginal itchiness and will like to get tested.  She mentions her Aunt and cousin passed away of Cervical cancer and she is worried she might have something similar.  \par \par #CPE\par - Patient with no complaints today\par - f/u CBC, CMP, A1c, Vit D, lipid profile\par - Will call the patient with the results\par - Patient refuses Flu shot\par - She had her 2nd covid shot last week\par \par #Well Woman Exam\par - Patient with itchiness and abdominal bloating. \par - Patient mentions she has history of recurrent bacterial infections.\par - f/u BV, HPV and pap smear\par \par *Case discussed with Dr. Davies

## 2021-09-24 ENCOUNTER — NON-APPOINTMENT (OUTPATIENT)
Age: 36
End: 2021-09-24

## 2021-09-26 DIAGNOSIS — Z01.419 ENCOUNTER FOR GYNECOLOGICAL EXAMINATION (GENERAL) (ROUTINE) WITHOUT ABNORMAL FINDINGS: ICD-10-CM

## 2021-09-27 ENCOUNTER — APPOINTMENT (OUTPATIENT)
Dept: FAMILY MEDICINE | Facility: HOSPITAL | Age: 36
End: 2021-09-27

## 2021-09-27 ENCOUNTER — OUTPATIENT (OUTPATIENT)
Dept: OUTPATIENT SERVICES | Facility: HOSPITAL | Age: 36
LOS: 1 days | End: 2021-09-27
Payer: MEDICAID

## 2021-09-27 ENCOUNTER — NON-APPOINTMENT (OUTPATIENT)
Age: 36
End: 2021-09-27

## 2021-09-27 ENCOUNTER — MED ADMIN CHARGE (OUTPATIENT)
Age: 36
End: 2021-09-27

## 2021-09-27 VITALS
OXYGEN SATURATION: 97 % | WEIGHT: 171 LBS | SYSTOLIC BLOOD PRESSURE: 110 MMHG | DIASTOLIC BLOOD PRESSURE: 65 MMHG | BODY MASS INDEX: 32.31 KG/M2 | RESPIRATION RATE: 16 BRPM | TEMPERATURE: 97 F | HEART RATE: 84 BPM

## 2021-09-27 DIAGNOSIS — Z98.890 OTHER SPECIFIED POSTPROCEDURAL STATES: Chronic | ICD-10-CM

## 2021-09-27 DIAGNOSIS — Z00.00 ENCOUNTER FOR GENERAL ADULT MEDICAL EXAMINATION WITHOUT ABNORMAL FINDINGS: ICD-10-CM

## 2021-09-27 DIAGNOSIS — N76.0 ACUTE VAGINITIS: ICD-10-CM

## 2021-09-27 DIAGNOSIS — N75.0 CYST OF BARTHOLIN'S GLAND: Chronic | ICD-10-CM

## 2021-09-27 DIAGNOSIS — B96.89 ACUTE VAGINITIS: ICD-10-CM

## 2021-09-27 PROCEDURE — G0463: CPT

## 2021-09-27 PROCEDURE — G0008: CPT

## 2021-09-28 DIAGNOSIS — A04.8 OTHER SPECIFIED BACTERIAL INTESTINAL INFECTIONS: ICD-10-CM

## 2021-09-28 DIAGNOSIS — Z01.419 ENCOUNTER FOR GYNECOLOGICAL EXAMINATION (GENERAL) (ROUTINE) WITHOUT ABNORMAL FINDINGS: ICD-10-CM

## 2021-09-28 DIAGNOSIS — N76.0 ACUTE VAGINITIS: ICD-10-CM

## 2021-09-28 DIAGNOSIS — Z23 ENCOUNTER FOR IMMUNIZATION: ICD-10-CM

## 2021-09-28 NOTE — HISTORY OF PRESENT ILLNESS
[FreeTextEntry1] : Follow up treatement of BV, Metronidizole  [de-identified] : 34 YO F presents for fullness in neck and takes time for food to go down into stomach. Patient states that she was treated for H. Pylori years ago, but has no other concerns. She was seen one week ago for CPE. Patient states that she is still gaining weight and has no nausea, vomiting, shortness of breath or chest pain. She has been given a PAP and will start on Metronidizole which was sent by the pharmacy.

## 2021-09-28 NOTE — REVIEW OF SYSTEMS
[Heartburn] : heartburn [Negative] : Integumentary [FreeTextEntry7] : Trouble getting food down esophagus,.

## 2021-09-29 ENCOUNTER — NON-APPOINTMENT (OUTPATIENT)
Age: 36
End: 2021-09-29

## 2021-09-29 LAB
ALBUMIN SERPL ELPH-MCNC: 4.7 G/DL
ALP BLD-CCNC: 55 U/L
ALT SERPL-CCNC: 18 U/L
ANION GAP SERPL CALC-SCNC: 14 MMOL/L
AST SERPL-CCNC: 22 U/L
BASOPHILS # BLD AUTO: 0.04 K/UL
BASOPHILS NFR BLD AUTO: 0.5 %
BILIRUB SERPL-MCNC: 0.3 MG/DL
BUN SERPL-MCNC: 16 MG/DL
CALCIUM SERPL-MCNC: 9.9 MG/DL
CANDIDA VAG CYTO: NOT DETECTED
CHLORIDE SERPL-SCNC: 105 MMOL/L
CHOLEST SERPL-MCNC: 229 MG/DL
CO2 SERPL-SCNC: 21 MMOL/L
CREAT SERPL-MCNC: 0.94 MG/DL
CYTOLOGY CVX/VAG DOC THIN PREP: ABNORMAL
EOSINOPHIL # BLD AUTO: 0.1 K/UL
EOSINOPHIL NFR BLD AUTO: 1.2 %
ESTIMATED AVERAGE GLUCOSE: 103 MG/DL
G VAGINALIS+PREV SP MTYP VAG QL MICRO: DETECTED
GLUCOSE SERPL-MCNC: 91 MG/DL
HBA1C MFR BLD HPLC: 5.2 %
HCT VFR BLD CALC: 39.2 %
HDLC SERPL-MCNC: 77 MG/DL
HGB BLD-MCNC: 12.2 G/DL
HPV HIGH+LOW RISK DNA PNL CVX: NOT DETECTED
IMM GRANULOCYTES NFR BLD AUTO: 0.2 %
LDLC SERPL CALC-MCNC: 114 MG/DL
LYMPHOCYTES # BLD AUTO: 2.88 K/UL
LYMPHOCYTES NFR BLD AUTO: 34 %
MAN DIFF?: NORMAL
MCHC RBC-ENTMCNC: 30 PG
MCHC RBC-ENTMCNC: 31.1 GM/DL
MCV RBC AUTO: 96.3 FL
MONOCYTES # BLD AUTO: 0.93 K/UL
MONOCYTES NFR BLD AUTO: 11 %
NEUTROPHILS # BLD AUTO: 4.49 K/UL
NEUTROPHILS NFR BLD AUTO: 53.1 %
NONHDLC SERPL-MCNC: 152 MG/DL
PLATELET # BLD AUTO: 255 K/UL
POTASSIUM SERPL-SCNC: 3.9 MMOL/L
PROT SERPL-MCNC: 7.5 G/DL
RBC # BLD: 4.07 M/UL
RBC # FLD: 13.1 %
SODIUM SERPL-SCNC: 140 MMOL/L
T VAGINALIS VAG QL WET PREP: NOT DETECTED
TRIGL SERPL-MCNC: 191 MG/DL
WBC # FLD AUTO: 8.46 K/UL

## 2021-10-09 ENCOUNTER — NON-APPOINTMENT (OUTPATIENT)
Age: 36
End: 2021-10-09

## 2021-10-15 ENCOUNTER — APPOINTMENT (OUTPATIENT)
Dept: FAMILY MEDICINE | Facility: HOSPITAL | Age: 36
End: 2021-10-15

## 2021-10-19 ENCOUNTER — NON-APPOINTMENT (OUTPATIENT)
Age: 36
End: 2021-10-19

## 2021-10-20 ENCOUNTER — OUTPATIENT (OUTPATIENT)
Dept: OUTPATIENT SERVICES | Facility: HOSPITAL | Age: 36
LOS: 1 days | End: 2021-10-20
Payer: MEDICAID

## 2021-10-20 ENCOUNTER — APPOINTMENT (OUTPATIENT)
Dept: FAMILY MEDICINE | Facility: HOSPITAL | Age: 36
End: 2021-10-20

## 2021-10-20 VITALS
RESPIRATION RATE: 16 BRPM | SYSTOLIC BLOOD PRESSURE: 130 MMHG | WEIGHT: 171 LBS | OXYGEN SATURATION: 95 % | TEMPERATURE: 96.6 F | DIASTOLIC BLOOD PRESSURE: 75 MMHG | HEIGHT: 61 IN | BODY MASS INDEX: 32.28 KG/M2 | HEART RATE: 100 BPM

## 2021-10-20 DIAGNOSIS — N75.0 CYST OF BARTHOLIN'S GLAND: Chronic | ICD-10-CM

## 2021-10-20 DIAGNOSIS — Z98.890 OTHER SPECIFIED POSTPROCEDURAL STATES: Chronic | ICD-10-CM

## 2021-10-20 DIAGNOSIS — Z00.00 ENCOUNTER FOR GENERAL ADULT MEDICAL EXAMINATION WITHOUT ABNORMAL FINDINGS: ICD-10-CM

## 2021-10-20 PROCEDURE — G0463: CPT

## 2021-10-20 RX ORDER — METRONIDAZOLE 500 MG/1
500 TABLET ORAL TWICE DAILY
Qty: 14 | Refills: 0 | Status: DISCONTINUED | COMMUNITY
Start: 2021-09-26 | End: 2021-10-20

## 2021-10-20 NOTE — PHYSICAL EXAM
[No Acute Distress] : no acute distress [Well Nourished] : well nourished [Well Developed] : well developed [No Respiratory Distress] : no respiratory distress  [No Accessory Muscle Use] : no accessory muscle use [Clear to Auscultation] : lungs were clear to auscultation bilaterally [Normal Rate] : normal rate  [Regular Rhythm] : with a regular rhythm [Normal S1, S2] : normal S1 and S2 [No Joint Swelling] : no joint swelling [Grossly Normal Strength/Tone] : grossly normal strength/tone [Normal Affect] : the affect was normal [Normal Insight/Judgement] : insight and judgment were intact

## 2021-10-20 NOTE — PLAN
[FreeTextEntry1] : \par \par \par #PPD Testing\par -Administered PPD in Left Forearm today. Return to clinic for PPD reading 10/22/21 and work forms.\par \par #ACL complete tear\par -Advised to return to Ortho\par -Ordered Ortho refferal \par \par #Dyspepsia\par -Ordered Stool h. pylori antigen\par -Follow up results and start appropriate PPI treatment\par -Ordered GI refferal\par \par RTC 1 month for Dyspepsia resolution

## 2021-10-20 NOTE — REVIEW OF SYSTEMS
[Abdominal Pain] : abdominal pain [Nausea] : nausea [Heartburn] : heartburn [Joint Pain] : joint pain [Joint Stiffness] : joint stiffness [Fever] : no fever [Chills] : no chills [Recent Change In Weight] : ~T no recent weight change [Earache] : no earache [Chest Pain] : no chest pain [Palpitations] : no palpitations [Orthopnea] : no orthopnea [Constipation] : no constipation [Diarrhea] : diarrhea [Vomiting] : no vomiting [Melena] : no melena [Joint Swelling] : no joint swelling [Muscle Weakness] : no muscle weakness [Muscle Pain] : no muscle pain [Back Pain] : no back pain [Suicidal] : not suicidal [Insomnia] : no insomnia

## 2021-10-20 NOTE — HISTORY OF PRESENT ILLNESS
[FreeTextEntry8] : Patient is a 34 y/o F with a hx of dyspepsia, ruptured ACL, deppression/anxiety who presents for PPD test for work. \par \par Patient reports persistent dypepsia that has been a chronic problem for the past 8 years.  Occurs on a daily basis. Associated with nausea,bloating, occasional vomiting, and epigastric burning postpandrial.  Patient currently not on medication. Last seen by GI specialist in 2017 s/p EGD and colonoscopy. Patient self reports being positive for H. Pylori but does not recall undergoing 2 week treatment. \par Patient denies melena, hematochezia. \par \par Patient reports right knee instability that has improved slowly over time.  Patient last seen by Ortho 4/29/21 and underwent PT at that time. Patient not currently using mobility assisting device to ambulate.  \par

## 2021-10-21 DIAGNOSIS — S83.8X1A SPRAIN OF OTHER SPECIFIED PARTS OF RIGHT KNEE, INITIAL ENCOUNTER: ICD-10-CM

## 2021-10-21 DIAGNOSIS — S83.511D SPRAIN OF ANTERIOR CRUCIATE LIGAMENT OF RIGHT KNEE, SUBSEQUENT ENCOUNTER: ICD-10-CM

## 2021-10-21 DIAGNOSIS — R10.13 EPIGASTRIC PAIN: ICD-10-CM

## 2021-11-05 ENCOUNTER — NON-APPOINTMENT (OUTPATIENT)
Age: 36
End: 2021-11-05

## 2021-11-09 ENCOUNTER — NON-APPOINTMENT (OUTPATIENT)
Age: 36
End: 2021-11-09

## 2021-11-17 ENCOUNTER — APPOINTMENT (OUTPATIENT)
Dept: FAMILY MEDICINE | Facility: HOSPITAL | Age: 36
End: 2021-11-17

## 2021-11-19 ENCOUNTER — OUTPATIENT (OUTPATIENT)
Dept: OUTPATIENT SERVICES | Facility: HOSPITAL | Age: 36
LOS: 1 days | End: 2021-11-19
Payer: MEDICAID

## 2021-11-19 ENCOUNTER — RESULT CHARGE (OUTPATIENT)
Age: 36
End: 2021-11-19

## 2021-11-19 ENCOUNTER — APPOINTMENT (OUTPATIENT)
Dept: FAMILY MEDICINE | Facility: HOSPITAL | Age: 36
End: 2021-11-19

## 2021-11-19 VITALS
WEIGHT: 170 LBS | BODY MASS INDEX: 32.12 KG/M2 | TEMPERATURE: 96.4 F | SYSTOLIC BLOOD PRESSURE: 129 MMHG | OXYGEN SATURATION: 95 % | HEART RATE: 95 BPM | RESPIRATION RATE: 16 BRPM | DIASTOLIC BLOOD PRESSURE: 88 MMHG

## 2021-11-19 DIAGNOSIS — Z98.890 OTHER SPECIFIED POSTPROCEDURAL STATES: Chronic | ICD-10-CM

## 2021-11-19 DIAGNOSIS — Z00.00 ENCOUNTER FOR GENERAL ADULT MEDICAL EXAMINATION WITHOUT ABNORMAL FINDINGS: ICD-10-CM

## 2021-11-19 DIAGNOSIS — Z91.89 OTHER SPECIFIED PERSONAL RISK FACTORS, NOT ELSEWHERE CLASSIFIED: ICD-10-CM

## 2021-11-19 DIAGNOSIS — N75.0 CYST OF BARTHOLIN'S GLAND: Chronic | ICD-10-CM

## 2021-11-19 PROCEDURE — 36415 COLL VENOUS BLD VENIPUNCTURE: CPT

## 2021-11-19 PROCEDURE — 87800 DETECT AGNT MULT DNA DIREC: CPT

## 2021-11-19 PROCEDURE — 87389 HIV-1 AG W/HIV-1&-2 AB AG IA: CPT

## 2021-11-19 PROCEDURE — G0463: CPT

## 2021-11-19 PROCEDURE — 86780 TREPONEMA PALLIDUM: CPT

## 2021-11-19 PROCEDURE — 80074 ACUTE HEPATITIS PANEL: CPT

## 2021-11-20 PROBLEM — Z91.89 HAS MULTIPLE SEXUAL PARTNERS: Status: ACTIVE | Noted: 2021-11-20

## 2021-11-21 LAB
BILIRUB UR QL STRIP: NEGATIVE
C TRACH RRNA SPEC QL NAA+PROBE: NOT DETECTED
CANDIDA VAG CYTO: DETECTED
G VAGINALIS+PREV SP MTYP VAG QL MICRO: NOT DETECTED
GLUCOSE UR-MCNC: NEGATIVE
HAV IGM SER QL: NONREACTIVE
HBV CORE IGM SER QL: NONREACTIVE
HBV SURFACE AG SER QL: NONREACTIVE
HCG UR QL: 0.2 EU/DL
HCV AB SER QL: NONREACTIVE
HCV S/CO RATIO: 0.1 S/CO
HGB UR QL STRIP.AUTO: NEGATIVE
HIV1+2 AB SPEC QL IA.RAPID: NONREACTIVE
KETONES UR-MCNC: NEGATIVE
LEUKOCYTE ESTERASE UR QL STRIP: NEGATIVE
N GONORRHOEA RRNA SPEC QL NAA+PROBE: NOT DETECTED
NITRITE UR QL STRIP: NEGATIVE
PH UR STRIP: 5.5
PROT UR STRIP-MCNC: NEGATIVE
SOURCE AMPLIFICATION: NORMAL
SP GR UR STRIP: >= 1.03
T PALLIDUM AB SER QL IA: NEGATIVE
T VAGINALIS VAG QL WET PREP: NOT DETECTED

## 2021-11-21 NOTE — REVIEW OF SYSTEMS
[Abdominal Pain] : abdominal pain [Dysuria] : dysuria [Vaginal Discharge] : vaginal discharge [Fever] : no fever [Chills] : no chills [Night Sweats] : no night sweats [Discharge] : no discharge [Itching] : no itching [Nosebleed] : no nosebleeds [Nasal Discharge] : no nasal discharge [Sore Throat] : no sore throat [Chest Pain] : no chest pain [Orthopnea] : no orthopnea [Shortness Of Breath] : no shortness of breath [Cough] : no cough [Wheezing] : no wheezing [Nausea] : no nausea [Diarrhea] : diarrhea [Vomiting] : no vomiting [Hematuria] : no hematuria [Joint Swelling] : no joint swelling [Muscle Weakness] : no muscle weakness [Muscle Pain] : no muscle pain [Headache] : no headache [Confusion] : no confusion [Memory Loss] : no memory loss

## 2021-11-21 NOTE — PHYSICAL EXAM
[No Acute Distress] : no acute distress [Well Nourished] : well nourished [Well-Appearing] : well-appearing [Normal Sclera/Conjunctiva] : normal sclera/conjunctiva [PERRL] : pupils equal round and reactive to light [EOMI] : extraocular movements intact [Normal Outer Ear/Nose] : the outer ears and nose were normal in appearance [No Respiratory Distress] : no respiratory distress  [No Accessory Muscle Use] : no accessory muscle use [Clear to Auscultation] : lungs were clear to auscultation bilaterally [Normal Rate] : normal rate  [Regular Rhythm] : with a regular rhythm [Normal S1, S2] : normal S1 and S2 [Soft] : abdomen soft [Non Tender] : non-tender [Non-distended] : non-distended [Urethral Meatus] : normal urethra [Urinary Bladder Findings] : the bladder was normal on palpation [External Female Genitalia] : normal external genitalia [Vagina] : normal vaginal exam [Cervix] : normal cervix [FreeTextEntry1] : Minimal physiologic discharge noted
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
## 2021-11-21 NOTE — PLAN
[FreeTextEntry1] : Pt is a 36 yo F, PMH significant for EDOUARD and MDD, presenting for immunization. She is being evaluated for:\par \par #Health Maintenance\par - MMR provided for new employment\par - POCT pregnancy test\par - Pt endorses that CPE is not needed at this time\par \par #Dysuria\par - Burning pain on urination, persistent LLQ however minimal TTP on exam\par - POCT pregnancy test\par - Urinalysis \par - Urine Culture\par \par #Vaginal discharge\par #STD testing\par - Sxs of clumpy white discharge; multiple sex partners, pt requests STD and HIV testing\par - Only physiologic discharge noted on PE\par - GC/chlamydia\par - Syphillis Screen\par - Hepatitis panel\par - HIV test\par \par Case discussed w/ Dr. Rivas\par

## 2021-11-22 ENCOUNTER — EMERGENCY (EMERGENCY)
Facility: HOSPITAL | Age: 36
LOS: 1 days | Discharge: HOME | End: 2021-11-22
Attending: INTERNAL MEDICINE | Admitting: INTERNAL MEDICINE
Payer: SELF-PAY

## 2021-11-22 VITALS
TEMPERATURE: 98 F | HEART RATE: 79 BPM | HEIGHT: 62 IN | WEIGHT: 169.98 LBS | OXYGEN SATURATION: 96 % | RESPIRATION RATE: 18 BRPM | DIASTOLIC BLOOD PRESSURE: 120 MMHG | SYSTOLIC BLOOD PRESSURE: 156 MMHG

## 2021-11-22 DIAGNOSIS — Z98.890 OTHER SPECIFIED POSTPROCEDURAL STATES: Chronic | ICD-10-CM

## 2021-11-22 DIAGNOSIS — N75.0 CYST OF BARTHOLIN'S GLAND: Chronic | ICD-10-CM

## 2021-11-22 DIAGNOSIS — N89.8 OTHER SPECIFIED NONINFLAMMATORY DISORDERS OF VAGINA: ICD-10-CM

## 2021-11-22 PROCEDURE — 73030 X-RAY EXAM OF SHOULDER: CPT

## 2021-11-22 PROCEDURE — 73030 X-RAY EXAM OF SHOULDER: CPT | Mod: 26,RT

## 2021-11-22 PROCEDURE — 71045 X-RAY EXAM CHEST 1 VIEW: CPT

## 2021-11-22 PROCEDURE — 99284 EMERGENCY DEPT VISIT MOD MDM: CPT | Mod: 25

## 2021-11-22 PROCEDURE — 99285 EMERGENCY DEPT VISIT HI MDM: CPT

## 2021-11-22 PROCEDURE — 72125 CT NECK SPINE W/O DYE: CPT | Mod: MA

## 2021-11-22 PROCEDURE — 72125 CT NECK SPINE W/O DYE: CPT | Mod: 26,MA

## 2021-11-22 PROCEDURE — 71045 X-RAY EXAM CHEST 1 VIEW: CPT | Mod: 26

## 2021-11-22 RX ORDER — LIDOCAINE 4 G/100G
1 CREAM TOPICAL ONCE
Refills: 0 | Status: COMPLETED | OUTPATIENT
Start: 2021-11-22 | End: 2021-11-22

## 2021-11-22 RX ORDER — ACETAMINOPHEN 500 MG
975 TABLET ORAL ONCE
Refills: 0 | Status: COMPLETED | OUTPATIENT
Start: 2021-11-22 | End: 2021-11-22

## 2021-11-22 RX ORDER — IBUPROFEN 200 MG
600 TABLET ORAL ONCE
Refills: 0 | Status: COMPLETED | OUTPATIENT
Start: 2021-11-22 | End: 2021-11-22

## 2021-11-22 RX ADMIN — Medication 600 MILLIGRAM(S): at 18:16

## 2021-11-22 RX ADMIN — Medication 975 MILLIGRAM(S): at 18:16

## 2021-11-22 RX ADMIN — LIDOCAINE 1 PATCH: 4 CREAM TOPICAL at 18:17
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
## 2021-11-22 NOTE — ED PROVIDER NOTE - PHYSICAL EXAMINATION
msk: +right para-cervical and trapezius muscle TTP, +mild midline cervical spine TTP. No lumbar or thoracic midline TTP. Pt in a c-collar  +right shoulder TTP. FROM of the shoulder. pelvis stable FROM of hips b/l   2+radial pulses   no neurovasc compromise on exam    no chest wall or rib TTP.   pt ambulatory

## 2021-11-22 NOTE — ED PROVIDER NOTE - PROGRESS NOTE DETAILS
AMANDA Emanuel: Pt signed out to me. Plan to follow up ct cervical spine. AMANDA Emanuel: Ct cervical spine showing no acute findings. Pt stable for dc and fu with pmd. AMANDA Emanuel: Ct cervical spine showing no acute findings. c collar cleared. pt moving all extremities. Pt stable for dc and fu with pmd.
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
## 2021-11-22 NOTE — ED PROVIDER NOTE - NSICDXPASTMEDICALHX_GEN_ALL_CORE_FT
PAST MEDICAL HISTORY:  Anxiety     H. pylori infection     Hypertension     PCOS (polycystic ovarian syndrome)

## 2021-11-22 NOTE — ED PROVIDER NOTE - NSFOLLOWUPINSTRUCTIONS_ED_ALL_ED_FT
Rest, apply heat packs   Take motrin 600mg every 6 hours as needed for pain   Return to the ED if any worsening or persistent symptoms           Cervical Sprain    WHAT YOU NEED TO KNOW:    A cervical sprain is a stretched or torn muscle or ligament in your neck. Ligaments are strong tissues that connect bones.    DISCHARGE INSTRUCTIONS:    Return to the emergency department if:   •You have pain or numbness from your shoulder down to your hand.      •You have problems with your vision, hearing, or balance.      •You feel confused or cannot concentrate.      •You have problems with movement and strength.      Call your doctor if:   •You have increased swelling or pain in your neck.       •You have questions or concerns about your condition or care.      Medicines: You may need any of the following:   •Acetaminophen decreases pain and fever. It is available without a doctor's order. Ask how much to take and how often to take it. Follow directions. Read the labels of all other medicines you are using to see if they also contain acetaminophen, or ask your doctor or pharmacist. Acetaminophen can cause liver damage if not taken correctly. Do not use more than 4 grams (4,000 milligrams) total of acetaminophen in one day.       •NSAIDs, such as ibuprofen, help decrease swelling, pain, and fever. This medicine is available with or without a doctor's order. NSAIDs can cause stomach bleeding or kidney problems in certain people. If you take blood thinner medicine, always ask your healthcare provider if NSAIDs are safe for you. Always read the medicine label and follow directions.      •Muscle relaxers help decrease pain and muscle spasms.      •Prescription pain medicine may be given. Ask your healthcare provider how to take this medicine safely. Some prescription pain medicines contain acetaminophen. Do not take other medicines that contain acetaminophen without talking to your healthcare provider. Too much acetaminophen may cause liver damage. Prescription pain medicine may cause constipation. Ask your healthcare provider how to prevent or treat constipation.       •Take your medicine as directed. Contact your healthcare provider if you think your medicine is not helping or if you have side effects. Tell him or her if you are allergic to any medicine. Keep a list of the medicines, vitamins, and herbs you take. Include the amounts, and when and why you take them. Bring the list or the pill bottles to follow-up visits. Carry your medicine list with you in case of an emergency.      Manage your symptoms:   •Apply heat on your neck for 15 to 20 minutes, 4 to 6 times a day or as directed. Heat helps decrease pain, stiffness, and muscle spasms.      •Begin gentle neck exercises as soon as you can move your neck without pain. Exercises will help decrease stiffness and improve the strength and movement of your neck. Ask your healthcare provider what kind of exercises you should do.      •Gradually return to your usual activities as directed. Stop if you have pain. Avoid activities that can cause more damage to your neck, such as heavy lifting or strenuous exercise.      •Sleep without a pillow to help decrease pain. Instead, roll a small towel tightly and place it under your neck.       •Go to physical therapy as directed. A physical therapist teaches you exercises to help improve movement and strength, and to decrease pain.       Prevent another neck injury:   •Drive safely. Make sure everyone in your car wears a seatbelt. A seatbelt can save your life if you are in an accident. Do not use your cell phone when you are driving. This could distract you and cause an accident. Pull over if you need to make a call or send a text message.       •Wear helmets, lifejackets, and protective gear. Always wear a helmet when you ride a bike or motorcycle, go skiing, or play sports that could cause a head injury. Wear protective equipment when you play sports. Wear a lifejacket when you are on a boat or doing water sports.       Follow up with your doctor as directed: You may be referred to an orthopedist or a physical therapist. Write down your questions so you remember to ask them during your visits.        © Copyright [employer] 2021           back to top                          © Copyright [employer] 2021
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
## 2021-11-22 NOTE — ED ADULT NURSE NOTE - CAS TRG GENERAL NORM CIRC DET
Date of service: 



12/04/2018



HISTORY:

 Sepsis Patient 



COMPARISON:

No prior. 



FINDINGS:



LUNGS:

No active pulmonary disease.



PLEURA:

No significant pleural effusion identified, no pneumothorax apparent.



CARDIOVASCULAR:

No atherosclerotic calcification present



Normal.



OSSEOUS STRUCTURES:

No significant abnormalities.



VISUALIZED UPPER ABDOMEN:

Normal.



OTHER FINDINGS:

None.



IMPRESSION:

No active disease. 



___________________________________________________________



Concordant results with the preliminary interpretation rendered by 

the emergency department physician

procedure.
Strong peripheral pulses

## 2021-11-22 NOTE — ED PROVIDER NOTE - NSICDXPASTSURGICALHX_GEN_ALL_CORE_FT
PAST SURGICAL HISTORY:  Bartholin cyst in november, had drainage at [facility]    History of repair of ACL right

## 2021-11-22 NOTE — ED PROVIDER NOTE - PATIENT PORTAL LINK FT
You can access the FollowMyHealth Patient Portal offered by [facility] by registering at the following website: [email or web address]. By joining [facility]’s FollowMyHealth portal, you will also be able to view your health information using other applications (apps) compatible with our system.
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
## 2021-11-23 ENCOUNTER — NON-APPOINTMENT (OUTPATIENT)
Age: 36
End: 2021-11-23

## 2021-11-29 ENCOUNTER — APPOINTMENT (OUTPATIENT)
Dept: FAMILY MEDICINE | Facility: HOSPITAL | Age: 36
End: 2021-11-29

## 2021-11-29 ENCOUNTER — OUTPATIENT (OUTPATIENT)
Dept: OUTPATIENT SERVICES | Facility: HOSPITAL | Age: 36
LOS: 1 days | End: 2021-11-29
Payer: MEDICAID

## 2021-11-29 DIAGNOSIS — N75.0 CYST OF BARTHOLIN'S GLAND: Chronic | ICD-10-CM

## 2021-11-29 DIAGNOSIS — J02.9 ACUTE PHARYNGITIS, UNSPECIFIED: ICD-10-CM

## 2021-11-29 DIAGNOSIS — Z98.890 OTHER SPECIFIED POSTPROCEDURAL STATES: Chronic | ICD-10-CM

## 2021-11-29 DIAGNOSIS — Z00.00 ENCOUNTER FOR GENERAL ADULT MEDICAL EXAMINATION WITHOUT ABNORMAL FINDINGS: ICD-10-CM

## 2021-11-29 PROCEDURE — U0003: CPT

## 2021-11-29 PROCEDURE — U0005: CPT

## 2021-11-29 PROCEDURE — G0463: CPT

## 2021-11-29 NOTE — PLAN
[FreeTextEntry1] : \par #High Suspicion for COVID-19 \par -F/u COVID-19 PCR \par -Pt instructed to Purchase a Pulse Oximeter from [employer] as they offer Prime Delivery guaranteeing delivery within 24-48hrs \par -Continue to Self Quarantine until contacted with Negatvie Swab Results\par -Practice Social Distancing \par -Practice hand hygiene \par -Continue with surgical Mask PPE \par -Continue to monitor For signs of Respiratory distress or saturations below 92%\par -Contact the clinic or retrun to the ED with Sxs concerning for Progressive COVID Viral Syndrome or difficulty breathing \par

## 2021-11-29 NOTE — HISTORY OF PRESENT ILLNESS
[Other Location: e.g. School (Enter Location, City,State)___] : at [unfilled], at the time of the visit. [Medical Office: ([place] [facility])___] : at the medical office located in  [Verbal consent obtained from patient] : the patient, [unfilled] [Time Spent: ___ minutes] : I have spent [unfilled] minutes with the patient on the telephone [FreeTextEntry1] : \par \par ***************COVID Initial Visit **************************\par \par \par 35F with no signifigant PMH presenting for telephonic evaluation for sxs concerning for COVID Viral Syndrome with no known (+) COVID Contact. Pt states her sxs began 4 days prior, with sore thorat, she denies Fevers, pleuritic chest pain. On further ROS She denies  SOB/VIEIRA, abdominal pain, diarrhea or LE calf pain with remaining ROS unremarkable.\par \par Pt has been advised to continue to self quarantine for a total of 10 days from the onset of their initial sxs and 3days from their last documented fever and also to contact the clinic or return to the ED with concerning sxs for progressive disease or return to the ED for further evaluation. Pt amenable to the plan above.\par \par \par \par

## 2021-12-02 ENCOUNTER — APPOINTMENT (OUTPATIENT)
Dept: FAMILY MEDICINE | Facility: HOSPITAL | Age: 36
End: 2021-12-02

## 2021-12-02 DIAGNOSIS — J02.9 ACUTE PHARYNGITIS, UNSPECIFIED: ICD-10-CM

## 2021-12-06 ENCOUNTER — NON-APPOINTMENT (OUTPATIENT)
Age: 36
End: 2021-12-06

## 2021-12-14 LAB — SARS-COV-2 N GENE NPH QL NAA+PROBE: NOT DETECTED

## 2021-12-15 ENCOUNTER — NON-APPOINTMENT (OUTPATIENT)
Age: 36
End: 2021-12-15

## 2021-12-23 ENCOUNTER — NON-APPOINTMENT (OUTPATIENT)
Age: 36
End: 2021-12-23

## 2021-12-27 ENCOUNTER — OUTPATIENT (OUTPATIENT)
Dept: OUTPATIENT SERVICES | Facility: HOSPITAL | Age: 36
LOS: 1 days | End: 2021-12-27
Payer: MEDICAID

## 2021-12-27 ENCOUNTER — APPOINTMENT (OUTPATIENT)
Dept: FAMILY MEDICINE | Facility: HOSPITAL | Age: 36
End: 2021-12-27

## 2021-12-27 DIAGNOSIS — N75.0 CYST OF BARTHOLIN'S GLAND: Chronic | ICD-10-CM

## 2021-12-27 DIAGNOSIS — Z98.890 OTHER SPECIFIED POSTPROCEDURAL STATES: Chronic | ICD-10-CM

## 2021-12-27 DIAGNOSIS — Z00.00 ENCOUNTER FOR GENERAL ADULT MEDICAL EXAMINATION WITHOUT ABNORMAL FINDINGS: ICD-10-CM

## 2021-12-27 PROCEDURE — U0005: CPT

## 2021-12-27 PROCEDURE — U0003: CPT

## 2021-12-27 PROCEDURE — G0463: CPT

## 2022-01-03 RX ORDER — FLUCONAZOLE 150 MG/1
150 TABLET ORAL
Qty: 1 | Refills: 0 | Status: COMPLETED | COMMUNITY
Start: 2021-11-21 | End: 2022-01-03

## 2022-01-03 NOTE — HISTORY OF PRESENT ILLNESS
[Other Location: e.g. School (Enter Location, City,State)___] : at [unfilled], at the time of the visit. [Medical Office: ([facility])___] : at the medical office located in  [Verbal consent obtained from patient] : the patient, [unfilled] [Time Spent: ___ minutes] : I have spent [unfilled] minutes with the patient on the telephone [FreeTextEntry1] : 37 y/o F presenting for covid testing. On 12/16/21 pt was near covid positive person but did not find out on [place] day. Pt is asymptomatic. Denies fever, chills, HA, SOB, cough, CP, n/v/d, abd pain, anosmia/ageusia.\par Niece and nephew have mild cough and will plan on getting tested w/ pediatrician.

## 2022-01-03 NOTE — PLAN
[FreeTextEntry1] : \par #COVID 19 Testing, COVID19 exposure\par -Pt exposed 12/16 but asymptomatic\par -Quarantine pending results\par -ED precautions given

## 2022-01-07 ENCOUNTER — NON-APPOINTMENT (OUTPATIENT)
Age: 37
End: 2022-01-07

## 2022-01-19 ENCOUNTER — APPOINTMENT (OUTPATIENT)
Dept: FAMILY MEDICINE | Facility: HOSPITAL | Age: 37
End: 2022-01-19

## 2022-01-19 ENCOUNTER — NON-APPOINTMENT (OUTPATIENT)
Age: 37
End: 2022-01-19

## 2022-01-19 ENCOUNTER — OUTPATIENT (OUTPATIENT)
Dept: OUTPATIENT SERVICES | Facility: HOSPITAL | Age: 37
LOS: 1 days | End: 2022-01-19
Payer: MEDICAID

## 2022-01-19 VITALS
TEMPERATURE: 97.5 F | BODY MASS INDEX: 31.37 KG/M2 | RESPIRATION RATE: 14 BRPM | HEART RATE: 80 BPM | WEIGHT: 166 LBS | SYSTOLIC BLOOD PRESSURE: 131 MMHG | OXYGEN SATURATION: 99 % | DIASTOLIC BLOOD PRESSURE: 88 MMHG

## 2022-01-19 DIAGNOSIS — N75.0 CYST OF BARTHOLIN'S GLAND: Chronic | ICD-10-CM

## 2022-01-19 DIAGNOSIS — Z00.00 ENCOUNTER FOR GENERAL ADULT MEDICAL EXAMINATION WITHOUT ABNORMAL FINDINGS: ICD-10-CM

## 2022-01-19 DIAGNOSIS — Z98.890 OTHER SPECIFIED POSTPROCEDURAL STATES: Chronic | ICD-10-CM

## 2022-01-19 DIAGNOSIS — G47.00 INSOMNIA, UNSPECIFIED: ICD-10-CM

## 2022-01-19 LAB — SARS-COV-2 N GENE NPH QL NAA+PROBE: NOT DETECTED

## 2022-01-19 PROCEDURE — G0463: CPT

## 2022-01-19 NOTE — PHYSICAL EXAM
[No Respiratory Distress] : no respiratory distress  [No Accessory Muscle Use] : no accessory muscle use [Clear to Auscultation] : lungs were clear to auscultation bilaterally [Normal Rate] : normal rate  [Regular Rhythm] : with a regular rhythm [Normal S1, S2] : normal S1 and S2 [Alert and Oriented x3] : oriented to person, place, and time [Normal Insight/Judgement] : insight and judgment were intact [de-identified] : teary eyed throughout encounter

## 2022-01-19 NOTE — REVIEW OF SYSTEMS
[Insomnia] : insomnia [Depression] : depression [Chest Pain] : no chest pain [Palpitations] : no palpitations [Shortness Of Breath] : no shortness of breath [Wheezing] : no wheezing [Cough] : no cough [Suicidal] : not suicidal [Anxiety] : no anxiety

## 2022-01-19 NOTE — HISTORY OF PRESENT ILLNESS
[FreeTextEntry1] : grief counseling  [de-identified] : Patient is a 35 y/o F with a PMH of depression coming in after her sister passed away. Her older sister passed away yesterday from a brain aneurysm. She has custody of her sister's two children and one child of her own. She is concerned because she can't sleep and she is responsible for three children. She reports feeling like a "zombie" and would like some medication to temporarily assist her with sleeping. She follows closely with [facility]. Has never been on depression medication in the past.

## 2022-01-20 ENCOUNTER — NON-APPOINTMENT (OUTPATIENT)
Age: 37
End: 2022-01-20

## 2022-01-21 ENCOUNTER — NON-APPOINTMENT (OUTPATIENT)
Age: 37
End: 2022-01-21

## 2022-01-23 DIAGNOSIS — F32.A DEPRESSION, UNSPECIFIED: ICD-10-CM

## 2022-01-23 DIAGNOSIS — Z71.89 OTHER SPECIFIED COUNSELING: ICD-10-CM

## 2022-01-25 ENCOUNTER — NON-APPOINTMENT (OUTPATIENT)
Age: 37
End: 2022-01-25

## 2022-01-26 ENCOUNTER — APPOINTMENT (OUTPATIENT)
Dept: FAMILY MEDICINE | Facility: HOSPITAL | Age: 37
End: 2022-01-26

## 2022-01-27 ENCOUNTER — NON-APPOINTMENT (OUTPATIENT)
Age: 37
End: 2022-01-27

## 2022-02-03 ENCOUNTER — NON-APPOINTMENT (OUTPATIENT)
Age: 37
End: 2022-02-03

## 2022-02-07 ENCOUNTER — NON-APPOINTMENT (OUTPATIENT)
Age: 37
End: 2022-02-07

## 2022-03-11 ENCOUNTER — NON-APPOINTMENT (OUTPATIENT)
Age: 37
End: 2022-03-11

## 2022-03-30 ENCOUNTER — NON-APPOINTMENT (OUTPATIENT)
Age: 37
End: 2022-03-30

## 2022-04-29 ENCOUNTER — EMERGENCY (EMERGENCY)
Facility: HOSPITAL | Age: 37
LOS: 1 days | Discharge: HOME | End: 2022-04-29
Attending: EMERGENCY MEDICINE | Admitting: EMERGENCY MEDICINE
Payer: MEDICAID

## 2022-04-29 VITALS
HEART RATE: 120 BPM | HEIGHT: 62 IN | OXYGEN SATURATION: 97 % | WEIGHT: 160.94 LBS | TEMPERATURE: 97 F | DIASTOLIC BLOOD PRESSURE: 97 MMHG | SYSTOLIC BLOOD PRESSURE: 139 MMHG | RESPIRATION RATE: 18 BRPM

## 2022-04-29 DIAGNOSIS — N75.0 CYST OF BARTHOLIN'S GLAND: Chronic | ICD-10-CM

## 2022-04-29 DIAGNOSIS — Z98.890 OTHER SPECIFIED POSTPROCEDURAL STATES: Chronic | ICD-10-CM

## 2022-04-29 LAB
ANION GAP SERPL CALC-SCNC: 12 MMOL/L — SIGNIFICANT CHANGE UP (ref 5–17)
APPEARANCE UR: CLEAR — SIGNIFICANT CHANGE UP
APTT BLD: 30.6 SEC — SIGNIFICANT CHANGE UP (ref 27.5–35.5)
BASOPHILS # BLD AUTO: 0.06 K/UL — SIGNIFICANT CHANGE UP (ref 0–0.2)
BASOPHILS NFR BLD AUTO: 0.6 % — SIGNIFICANT CHANGE UP (ref 0–2)
BILIRUB UR-MCNC: NEGATIVE — SIGNIFICANT CHANGE UP
BUN SERPL-MCNC: 11 MG/DL — SIGNIFICANT CHANGE UP (ref 7–23)
CALCIUM SERPL-MCNC: 9.3 MG/DL — SIGNIFICANT CHANGE UP (ref 8.4–10.5)
CHLORIDE SERPL-SCNC: 107 MMOL/L — SIGNIFICANT CHANGE UP (ref 96–108)
CO2 SERPL-SCNC: 23 MMOL/L — SIGNIFICANT CHANGE UP (ref 22–31)
COLOR SPEC: YELLOW — SIGNIFICANT CHANGE UP
CREAT SERPL-MCNC: 1.01 MG/DL — SIGNIFICANT CHANGE UP (ref 0.5–1.3)
DIFF PNL FLD: ABNORMAL
EGFR: 73 ML/MIN/1.73M2 — SIGNIFICANT CHANGE UP
EOSINOPHIL # BLD AUTO: 0.27 K/UL — SIGNIFICANT CHANGE UP (ref 0–0.5)
EOSINOPHIL NFR BLD AUTO: 2.9 % — SIGNIFICANT CHANGE UP (ref 0–6)
GLUCOSE SERPL-MCNC: 94 MG/DL — SIGNIFICANT CHANGE UP (ref 70–99)
GLUCOSE UR QL: NEGATIVE — SIGNIFICANT CHANGE UP
HCT VFR BLD CALC: 37.6 % — SIGNIFICANT CHANGE UP (ref 34.5–45)
HGB BLD-MCNC: 12.3 G/DL — SIGNIFICANT CHANGE UP (ref 11.5–15.5)
IMM GRANULOCYTES NFR BLD AUTO: 0.5 % — SIGNIFICANT CHANGE UP (ref 0–1.5)
INR BLD: 0.94 RATIO — SIGNIFICANT CHANGE UP (ref 0.88–1.16)
KETONES UR-MCNC: NEGATIVE — SIGNIFICANT CHANGE UP
LEUKOCYTE ESTERASE UR-ACNC: ABNORMAL
LYMPHOCYTES # BLD AUTO: 2.95 K/UL — SIGNIFICANT CHANGE UP (ref 1–3.3)
LYMPHOCYTES # BLD AUTO: 31.6 % — SIGNIFICANT CHANGE UP (ref 13–44)
MCHC RBC-ENTMCNC: 31.9 PG — SIGNIFICANT CHANGE UP (ref 27–34)
MCHC RBC-ENTMCNC: 32.7 GM/DL — SIGNIFICANT CHANGE UP (ref 32–36)
MCV RBC AUTO: 97.7 FL — SIGNIFICANT CHANGE UP (ref 80–100)
MONOCYTES # BLD AUTO: 0.81 K/UL — SIGNIFICANT CHANGE UP (ref 0–0.9)
MONOCYTES NFR BLD AUTO: 8.7 % — SIGNIFICANT CHANGE UP (ref 2–14)
NEUTROPHILS # BLD AUTO: 5.21 K/UL — SIGNIFICANT CHANGE UP (ref 1.8–7.4)
NEUTROPHILS NFR BLD AUTO: 55.7 % — SIGNIFICANT CHANGE UP (ref 43–77)
NITRITE UR-MCNC: NEGATIVE — SIGNIFICANT CHANGE UP
NRBC # BLD: 0 /100 WBCS — SIGNIFICANT CHANGE UP (ref 0–0)
PH UR: 7 — SIGNIFICANT CHANGE UP (ref 5–8)
PLATELET # BLD AUTO: 260 K/UL — SIGNIFICANT CHANGE UP (ref 150–400)
POTASSIUM SERPL-MCNC: 3.3 MMOL/L — LOW (ref 3.5–5.3)
POTASSIUM SERPL-SCNC: 3.3 MMOL/L — LOW (ref 3.5–5.3)
PROT UR-MCNC: NEGATIVE — SIGNIFICANT CHANGE UP
PROTHROM AB SERPL-ACNC: 10.9 SEC — SIGNIFICANT CHANGE UP (ref 10.5–13.4)
RBC # BLD: 3.85 M/UL — SIGNIFICANT CHANGE UP (ref 3.8–5.2)
RBC # FLD: 13 % — SIGNIFICANT CHANGE UP (ref 10.3–14.5)
SODIUM SERPL-SCNC: 142 MMOL/L — SIGNIFICANT CHANGE UP (ref 135–145)
SP GR SPEC: 1 — LOW (ref 1.01–1.02)
UROBILINOGEN FLD QL: NEGATIVE — SIGNIFICANT CHANGE UP
WBC # BLD: 9.35 K/UL — SIGNIFICANT CHANGE UP (ref 3.8–10.5)
WBC # FLD AUTO: 9.35 K/UL — SIGNIFICANT CHANGE UP (ref 3.8–10.5)

## 2022-04-29 PROCEDURE — 99285 EMERGENCY DEPT VISIT HI MDM: CPT

## 2022-04-29 RX ORDER — SODIUM CHLORIDE 9 MG/ML
1000 INJECTION INTRAMUSCULAR; INTRAVENOUS; SUBCUTANEOUS ONCE
Refills: 0 | Status: COMPLETED | OUTPATIENT
Start: 2022-04-29 | End: 2022-04-29

## 2022-04-29 RX ADMIN — SODIUM CHLORIDE 1000 MILLILITER(S): 9 INJECTION INTRAMUSCULAR; INTRAVENOUS; SUBCUTANEOUS at 23:50

## 2022-04-29 NOTE — ED ADULT NURSE NOTE - OBJECTIVE STATEMENT
patient c/o of abdominal pain and bleeding for more than 7 days. Patient denies any chest pain, SOB, headache, dizziness and blurry vision

## 2022-04-29 NOTE — ED ADULT NURSE NOTE - NSICDXPASTSURGICALHX_GEN_ALL_CORE_FT
PAST SURGICAL HISTORY:  Bartholin cyst in november, had drainage at [facility]    History of repair of ACL right

## 2022-04-30 VITALS
DIASTOLIC BLOOD PRESSURE: 79 MMHG | SYSTOLIC BLOOD PRESSURE: 125 MMHG | HEART RATE: 92 BPM | RESPIRATION RATE: 17 BRPM | OXYGEN SATURATION: 98 %

## 2022-04-30 LAB
ALBUMIN SERPL ELPH-MCNC: 3.7 G/DL — SIGNIFICANT CHANGE UP (ref 3.3–5)
ALP SERPL-CCNC: 94 U/L — SIGNIFICANT CHANGE UP (ref 40–120)
ALT FLD-CCNC: 43 U/L — SIGNIFICANT CHANGE UP (ref 10–45)
AST SERPL-CCNC: 26 U/L — SIGNIFICANT CHANGE UP (ref 10–40)
BILIRUB SERPL-MCNC: 0.1 MG/DL — LOW (ref 0.2–1.2)
HCG SERPL-ACNC: <1 MIU/ML — SIGNIFICANT CHANGE UP
LIDOCAIN IGE QN: 135 U/L — SIGNIFICANT CHANGE UP (ref 73–393)
PROT SERPL-MCNC: 7.9 G/DL — SIGNIFICANT CHANGE UP (ref 6–8.3)

## 2022-04-30 PROCEDURE — 99283 EMERGENCY DEPT VISIT LOW MDM: CPT | Mod: 25

## 2022-04-30 PROCEDURE — 76830 TRANSVAGINAL US NON-OB: CPT

## 2022-04-30 PROCEDURE — 96360 HYDRATION IV INFUSION INIT: CPT

## 2022-04-30 PROCEDURE — 87086 URINE CULTURE/COLONY COUNT: CPT

## 2022-04-30 PROCEDURE — 85025 COMPLETE CBC W/AUTO DIFF WBC: CPT

## 2022-04-30 PROCEDURE — 85610 PROTHROMBIN TIME: CPT

## 2022-04-30 PROCEDURE — 86901 BLOOD TYPING SEROLOGIC RH(D): CPT

## 2022-04-30 PROCEDURE — 80053 COMPREHEN METABOLIC PANEL: CPT

## 2022-04-30 PROCEDURE — 85730 THROMBOPLASTIN TIME PARTIAL: CPT

## 2022-04-30 PROCEDURE — 86900 BLOOD TYPING SEROLOGIC ABO: CPT

## 2022-04-30 PROCEDURE — 86850 RBC ANTIBODY SCREEN: CPT

## 2022-04-30 PROCEDURE — 84702 CHORIONIC GONADOTROPIN TEST: CPT

## 2022-04-30 PROCEDURE — 83690 ASSAY OF LIPASE: CPT

## 2022-04-30 PROCEDURE — 76830 TRANSVAGINAL US NON-OB: CPT | Mod: 26

## 2022-04-30 PROCEDURE — 81001 URINALYSIS AUTO W/SCOPE: CPT

## 2022-04-30 RX ORDER — ACETAMINOPHEN 500 MG
975 TABLET ORAL ONCE
Refills: 0 | Status: COMPLETED | OUTPATIENT
Start: 2022-04-30 | End: 2022-04-30

## 2022-04-30 RX ADMIN — SODIUM CHLORIDE 1000 MILLILITER(S): 9 INJECTION INTRAMUSCULAR; INTRAVENOUS; SUBCUTANEOUS at 00:50

## 2022-04-30 RX ADMIN — Medication 975 MILLIGRAM(S): at 00:30

## 2022-04-30 RX ADMIN — Medication 975 MILLIGRAM(S): at 01:30

## 2022-04-30 NOTE — ED PROVIDER NOTE - PATIENT PORTAL LINK FT
You can access the FollowMyHealth Patient Portal offered by [facility] by registering at the following website: [email or web address]. By joining [employer]’s FollowMyHealth portal, you will also be able to view your health information using other applications (apps) compatible with our system.

## 2022-04-30 NOTE — ED PROVIDER NOTE - CARE PROVIDER_API CALL
Omkar Rivas)  Obstetrics and Gynecology  [address], [address]  [place] [address]  Phone: [phone]  Fax: [phone]  Follow Up Time: 1-3 Days

## 2022-04-30 NOTE — ED PROVIDER NOTE - CROS ED ROS STATEMENT
PDMP reviewed; no aberrant behavior identified, prescription authorized. CLARISA Lawson MD     all other ROS negative except as per HPI

## 2022-04-30 NOTE — ED PROVIDER NOTE - NSFOLLOWUPINSTRUCTIONS_ED_ALL_ED_FT
- take motrin 400-600mg every 6 hrs for pain as needed  - see your GYN doctor this week regarding pain and irregular bleeding    Follow Up in 1-3 Days with your own doctor or with  [address] [address]  Phone: [phone]      Abnormal (Dysfunctional) Uterine Bleeding    WHAT YOU NEED TO KNOW:    Abnormal uterine bleeding (AUB) is uterine bleeding that is not usual for you. It may also be called dysfunctional uterine bleeding. You may have bleeding from your uterus at times other than your normal monthly period. Your monthly periods may last longer or shorter, and bleeding may be heavier or lighter than usual. AUB can be acute (lasting a short time) or chronic (lasting longer than 6 months).  Female Reproductive System         DISCHARGE INSTRUCTIONS:    Return to the emergency department if:   •You continue to bleed heavily, or you feel faint.          Call your doctor or gynecologist if:   •You need to change your sanitary pad or tampon more than 1 time each hour.      •Your medicine causes nausea, vomiting, or diarrhea.      •You have questions or concerns about your condition or care.      Medicines: You may need any of the following:   •Hormones help decrease bleeding by making your monthly periods more regular. Sometimes this medicine may be given as birth control pills.      •Iron supplements may be given if your blood iron level decreases because of heavy bleeding. Iron may make you constipated. Ask your healthcare provider for ways to prevent or treat constipation. Iron may also make your bowel movements turn dark or black.      •Take your medicine as directed. Contact your healthcare provider if you think your medicine is not helping or if you have side effects. Tell him or her if you are allergic to any medicine. Keep a list of the medicines, vitamins, and herbs you take. Include the amounts, and when and why you take them. Bring the list or the pill bottles to follow-up visits. Carry your medicine list with you in case of an emergency.      Self-care:   •Apply heat on your lower abdomen to decrease pain and muscle spasms. Apply heat for 20 to 30 minutes every 2 hours for as many days as directed.      •Include foods high in iron if needed. Examples of foods high in iron are leafy green vegetables, beef, pork, liver, eggs, and whole-grain breads and cereals.  Sources of Iron           •Keep a diary of your menstrual cycles. Keep track of the number of tampons or pads you use each day.      •Talk to your healthcare provider before you start a weight loss program. You may need to wait until the abnormal bleeding has stopped before you try to lose weight. The amount of iron in your blood should be normal before you lose weight. Ask your provider if weight loss will help your AUB. He or she can tell you what weight is healthy for you. He or she can help you create a safe weight loss plan, if needed.      Follow up with your doctor or gynecologist as directed: You may need to return in 4 to 6 months so your provider knows if the AUB has stopped. Bring the diary of your menstrual cycles to your follow-up visits. Write down your questions so you remember to ask them during your visits.        Abdominal Pain    WHAT YOU NEED TO KNOW:    Abdominal pain can be dull, achy, or sharp. You may have pain in one area of your abdomen, or in your entire abdomen. Your pain may be caused by a condition such as constipation, food sensitivity or poisoning, infection, or a blockage. Abdominal pain can also be from a hernia, appendicitis, or an ulcer. Liver, gallbladder, or kidney conditions can also cause abdominal pain. The cause of your abdominal pain may be unknown.     DISCHARGE INSTRUCTIONS:    Return to the emergency department if:   •You have new chest pain or shortness of breath.      •You have pulsing pain in your upper abdomen or lower back that suddenly becomes constant.      •Your pain is in the right lower abdominal area and worsens with movement.       •You have a fever over 100.4°F (38°C) or shaking chills.       •You are vomiting and cannot keep food or liquids down.       •Your pain does not improve or gets worse over the next 8 to 12 hours.       •You see blood in your vomit or bowel movements, or they look black and tarry.       •Your skin or the whites of your eyes turn yellow.       •You are a woman and have a large amount of vaginal bleeding that is not your monthly period.       Contact your healthcare provider if:   •You have pain in your lower back.       •You are a man and have pain in your testicles.      •You have pain when you urinate.       •You have questions or concerns about your condition or care.      Follow up with your healthcare provider within 24 hours or as directed: Write down your questions so you remember to ask them during your visits.     Medicines:   •Medicines may be given to calm your stomach and prevent vomiting or to decrease pain. Ask how to take pain medicine safely.      •Take your medicine as directed. Contact your healthcare provider if you think your medicine is not helping or if you have side effects. Tell him of her if you are allergic to any medicine. Keep a list of the medicines, vitamins, and herbs you take. Include the amounts, and when and why you take them. Bring the list or the pill bottles to follow-up visits. Carry your medicine list with you in case of an emergency

## 2022-04-30 NOTE — ED PROVIDER NOTE - NSICDXPASTSURGICALHX_GEN_ALL_CORE_FT
PAST SURGICAL HISTORY:  Bartholin cyst in november, had drainage at [facility]    History of repair of ACL right

## 2022-04-30 NOTE — ED PROVIDER NOTE - CLINICAL SUMMARY MEDICAL DECISION MAKING FREE TEXT BOX
37 yo F c/o acute onset severe lower abd pains tonight, irreg vag bleed 2 wks.   possible ovarian cyst, ruptured cyst, r/o torsion. ?fibroids. sudden onset /severity makes appendicitis/infectious etiology less likely. pt wanted to be dc'ed as pain resolved already, but still mild ttp. suggested pt get pelvis sono which pt agreed to .  check labs, ua. tylenol for pain. reassess 37 yo F c/o acute onset severe lower abd pains tonight, irreg vag bleed 2 wks.   possible ovarian cyst, ruptured cyst, r/o torsion. ?fibroids. sudden onset /severity makes appendicitis/infectious etiology less likely. pt wanted to be dc'ed as pain resolved already, but still mild ttp. suggested pt get pelvis sono which pt agreed to .  check labs, ua. tylenol for pain. reassess    pt remains well. no distress. no pain. sono neg for torsion. told pt to f/u GYn re vag bleeding and informed of fibroids on sono

## 2022-04-30 NOTE — ED PROVIDER NOTE - OBJECTIVE STATEMENT
37 yo F c/o lower abd pains, sharp, severe, tonight, assoc c nausea. no fever/chills. no cp/sob. no dysuria. has had irreg vag bleeding for last 2 wks. only small amt this am. h/o ovary cysts

## 2022-05-02 ENCOUNTER — NON-APPOINTMENT (OUTPATIENT)
Age: 37
End: 2022-05-02

## 2022-05-03 LAB
CULTURE RESULTS: SIGNIFICANT CHANGE UP
SPECIMEN SOURCE: SIGNIFICANT CHANGE UP

## 2022-05-03 NOTE — ED PROVIDER NOTE - CPE EDP PSYCH NORM
INPATIENT DEATH NOTE  Harpreet Grayson 79 y o  female MRN: [id number]  Unit/Bed#: -01 Encounter: [id number]    Date, Time and Cause of Death    Date of Death: 5/3/22  Time of Death:  5:52 PM  Preliminary Cause of Death: Pneumonia  Entered by: Sharlene Ballesteros[RR1 1]     Attribution     RR1  [id number] David Sams PA-C 22 17:55           Patient's Information  Pronounced by: Edson Rivera  Did the patient's death occur in the ED?: No  Did the patient's death occur in the OR?: No  Did the patient's death occur less than 10 days post-op?: No  Did the patient's death occur within 24 hours of admission?: No  Was code status DNR at the time of death?: Yes    PHYSICAL EXAM:  Unresponsive to noxious stimuli, Spontaneous respirations absent, Breath sounds absent, Carotid pulse absent and Heart sounds absent    Medical Examiner notification criteria:  NONE APPLICABLE   Medical Examiner's office notified?:  No, does not meet ME notification criteria       Family Notification  Was the family notified?: Yes  Date Notified: 22  Time Notified: 6266  Notified by: Edson Rivera  Name of Family Notified of Death: [address] Person Relationship to Patient: Daughter  Family Notification Route: Telephone  Was the family told to contact a  home?: Yes  Name of [facility][de-identified] Richardine Kawasaki and [place] in [place] ([phone])    Autopsy Options:  Post-mortem examination declined by next of kin    Primary Service Attending Physician notified?:  yes - Attending:  Iker Hutchison DO    Physician/Resident responsible for completing Discharge Summary:  Edson Rivera PA-C
normal...

## 2022-05-04 NOTE — ED POST DISCHARGE NOTE - RESULT SUMMARY
u cx + low colony count 10-49 k strep bovis enterococcus like species no sensitivity could be done pt will follow up dr stovall results faxed to dr stovall

## 2022-07-06 ENCOUNTER — NON-APPOINTMENT (OUTPATIENT)
Age: 37
End: 2022-07-06

## 2022-08-12 ENCOUNTER — OUTPATIENT (OUTPATIENT)
Dept: OUTPATIENT SERVICES | Facility: HOSPITAL | Age: 37
LOS: 1 days | End: 2022-08-12
Payer: MEDICAID

## 2022-08-12 ENCOUNTER — APPOINTMENT (OUTPATIENT)
Dept: FAMILY MEDICINE | Facility: HOSPITAL | Age: 37
End: 2022-08-12

## 2022-08-12 VITALS
DIASTOLIC BLOOD PRESSURE: 79 MMHG | TEMPERATURE: 97.3 F | SYSTOLIC BLOOD PRESSURE: 118 MMHG | BODY MASS INDEX: 31.37 KG/M2 | HEART RATE: 77 BPM | WEIGHT: 166 LBS | RESPIRATION RATE: 16 BRPM | OXYGEN SATURATION: 98 %

## 2022-08-12 DIAGNOSIS — Z00.00 ENCOUNTER FOR GENERAL ADULT MEDICAL EXAMINATION WITHOUT ABNORMAL FINDINGS: ICD-10-CM

## 2022-08-12 DIAGNOSIS — Z11.1 ENCOUNTER FOR SCREENING FOR RESPIRATORY TUBERCULOSIS: ICD-10-CM

## 2022-08-12 DIAGNOSIS — N75.0 CYST OF BARTHOLIN'S GLAND: Chronic | ICD-10-CM

## 2022-08-12 DIAGNOSIS — Z98.890 OTHER SPECIFIED POSTPROCEDURAL STATES: Chronic | ICD-10-CM

## 2022-08-12 PROCEDURE — 86480 TB TEST CELL IMMUN MEASURE: CPT

## 2022-08-12 PROCEDURE — G0463: CPT

## 2022-08-13 PROBLEM — Z11.1 ENCOUNTER FOR SPECIAL SCREENING EXAMINATION FOR RESPIRATORY TUBERCULOSIS: Status: RESOLVED | Noted: 2022-08-12 | Resolved: 2022-08-26

## 2022-08-13 NOTE — PHYSICAL EXAM
[No Acute Distress] : no acute distress [Well-Appearing] : well-appearing [Normal Voice/Communication] : normal voice/communication [Normal Sclera/Conjunctiva] : normal sclera/conjunctiva [PERRL] : pupils equal round and reactive to light [EOMI] : extraocular movements intact [Normal Outer Ear/Nose] : the outer ears and nose were normal in appearance [Normal Oropharynx] : the oropharynx was normal [No JVD] : no jugular venous distention [No Lymphadenopathy] : no lymphadenopathy [Supple] : supple [No Respiratory Distress] : no respiratory distress  [No Accessory Muscle Use] : no accessory muscle use [Clear to Auscultation] : lungs were clear to auscultation bilaterally [Normal Rate] : normal rate  [Regular Rhythm] : with a regular rhythm [Normal S1, S2] : normal S1 and S2 [Soft] : abdomen soft [Non Tender] : non-tender [Non-distended] : non-distended [Coordination Grossly Intact] : coordination grossly intact [No Focal Deficits] : no focal deficits [Speech Grossly Normal] : speech grossly normal [Normal Affect] : the affect was normal [Normal Insight/Judgement] : insight and judgment were intact

## 2022-08-14 NOTE — REVIEW OF SYSTEMS
[Fever] : no fever [Chills] : no chills [Night Sweats] : no night sweats [Discharge] : no discharge [Pain] : no pain [Nosebleed] : no nosebleeds [Hoarseness] : no hoarseness [Chest Pain] : no chest pain [Palpitations] : no palpitations [Orthopnea] : no orthopnea [Shortness Of Breath] : no shortness of breath [Wheezing] : no wheezing [Cough] : no cough [Abdominal Pain] : no abdominal pain [Nausea] : no nausea [Vomiting] : no vomiting [Hematuria] : no hematuria [Confusion] : no confusion [Fainting] : no fainting [Memory Loss] : no memory loss

## 2022-08-14 NOTE — HISTORY OF PRESENT ILLNESS
[FreeTextEntry8] : Pt is a 35 yo F, PMH of obesity and depression, presents for TB screening. Of note, pt is applying to work for a  and requires a negative TB test for employment. Today pt feels well, with no acute sxs to report. Pt denies fever, night sweats, SOB, dyspnea, hemoptysis, malaise. No other urgent sxs or complaints endorsed at this time.

## 2022-08-18 LAB
M TB IFN-G BLD-IMP: NEGATIVE
QUANTIFERON TB PLUS MITOGEN MINUS NIL: >10 IU/ML
QUANTIFERON TB PLUS NIL: 0.03 IU/ML
QUANTIFERON TB PLUS TB1 MINUS NIL: -0.01 IU/ML
QUANTIFERON TB PLUS TB2 MINUS NIL: -0.01 IU/ML

## 2022-09-01 ENCOUNTER — EMERGENCY (EMERGENCY)
Facility: HOSPITAL | Age: 37
LOS: 1 days | Discharge: HOME | End: 2022-09-01
Attending: EMERGENCY MEDICINE | Admitting: EMERGENCY MEDICINE
Payer: MEDICAID

## 2022-09-01 DIAGNOSIS — N75.0 CYST OF BARTHOLIN'S GLAND: Chronic | ICD-10-CM

## 2022-09-01 DIAGNOSIS — Z98.890 OTHER SPECIFIED POSTPROCEDURAL STATES: Chronic | ICD-10-CM

## 2022-09-01 PROCEDURE — 99284 EMERGENCY DEPT VISIT MOD MDM: CPT

## 2022-09-01 RX ORDER — MORPHINE SULFATE 50 MG/1
8 CAPSULE, EXTENDED RELEASE ORAL ONCE
Refills: 0 | Status: DISCONTINUED | OUTPATIENT
Start: 2022-09-01 | End: 2022-09-01

## 2022-09-01 RX ORDER — IBUPROFEN 200 MG
600 TABLET ORAL ONCE
Refills: 0 | Status: COMPLETED | OUTPATIENT
Start: 2022-09-01 | End: 2022-09-01

## 2022-09-01 RX ORDER — OXYCODONE AND ACETAMINOPHEN 5; 325 MG/1; MG/1
1 TABLET ORAL ONCE
Refills: 0 | Status: DISCONTINUED | OUTPATIENT
Start: 2022-09-01 | End: 2022-09-01

## 2022-09-02 ENCOUNTER — NON-APPOINTMENT (OUTPATIENT)
Age: 37
End: 2022-09-02

## 2022-09-02 VITALS
WEIGHT: 149.91 LBS | RESPIRATION RATE: 18 BRPM | SYSTOLIC BLOOD PRESSURE: 126 MMHG | TEMPERATURE: 97 F | DIASTOLIC BLOOD PRESSURE: 74 MMHG | HEART RATE: 113 BPM | HEIGHT: 62 IN | OXYGEN SATURATION: 97 %

## 2022-09-02 PROCEDURE — 73610 X-RAY EXAM OF ANKLE: CPT

## 2022-09-02 PROCEDURE — 96372 THER/PROPH/DIAG INJ SC/IM: CPT

## 2022-09-02 PROCEDURE — 73610 X-RAY EXAM OF ANKLE: CPT | Mod: 26,RT

## 2022-09-02 PROCEDURE — 73562 X-RAY EXAM OF KNEE 3: CPT | Mod: 26,RT

## 2022-09-02 PROCEDURE — 73562 X-RAY EXAM OF KNEE 3: CPT

## 2022-09-02 PROCEDURE — 99284 EMERGENCY DEPT VISIT MOD MDM: CPT

## 2022-09-02 RX ORDER — OXYCODONE AND ACETAMINOPHEN 5; 325 MG/1; MG/1
2 TABLET ORAL
Qty: 24 | Refills: 0
Start: 2022-09-02 | End: 2022-09-04

## 2022-09-02 RX ADMIN — MORPHINE SULFATE 8 MILLIGRAM(S): 50 CAPSULE, EXTENDED RELEASE ORAL at 00:30

## 2022-09-02 RX ADMIN — Medication 600 MILLIGRAM(S): at 00:38

## 2022-09-02 RX ADMIN — MORPHINE SULFATE 8 MILLIGRAM(S): 50 CAPSULE, EXTENDED RELEASE ORAL at 00:00

## 2022-09-02 NOTE — ED PROVIDER NOTE - OBJECTIVE STATEMENT
pt s/p mechanical trip and fall down 3 steps, c/o sudden onset of severe R knee pain and unable to bare weight on leg. c/o swelling to R knee and tingling to her R toes.

## 2022-09-02 NOTE — ED PROVIDER NOTE - NSICDXPASTSURGICALHX_GEN_ALL_CORE_FT
PAST SURGICAL HISTORY:  Bartholin cyst in november, had drainage at [facility]    History of repair of ACL right

## 2022-09-02 NOTE — ED PROVIDER NOTE - PATIENT PORTAL LINK FT
You can access the FollowMyHealth Patient Portal offered by [facility] by registering at the following website: [email or web address]. By joining [employer]’s FollowMyHealth portal, you will also be able to view your health information using other applications (apps) compatible with our system.

## 2022-09-02 NOTE — ED ADULT NURSE NOTE - NSICDXPASTSURGICALHX_GEN_ALL_CORE_FT
PAST SURGICAL HISTORY:  Bartholin cyst in november, had drainage at [facility]    History of repair of ACL right

## 2022-09-02 NOTE — ED PROVIDER NOTE - NSFOLLOWUPINSTRUCTIONS_ED_ALL_ED_FT
-- Follow up with Dr. Rodriguez as soon as possible for further evaluation of the your knee.    -- Take pain medications as prescribed.    -- Return to the ER for worsening or persistent symptoms, and/or ANY NEW OR CONCERNING SYMPTOMS.    -- If you have difficulty following up, please call: [phone] to obtain a [facility] doctor or specialist who takes your insurance in your area.

## 2022-09-02 NOTE — ED ADULT NURSE NOTE - OBJECTIVE STATEMENT
Pt states she fell down 3 steps and injured right leg and ankle. No obvious signs of bleeding, bruising, or deformity noted. Pt complaining of severe pain and inability to bare weight on the extremity.

## 2022-09-02 NOTE — ED ADULT NURSE REASSESSMENT NOTE - NS ED NURSE REASSESS COMMENT FT1
At time of discharge, Pt was provided with crutches, knee immobilizer, and ace bandage at MDs request.

## 2022-09-02 NOTE — ED PROVIDER NOTE - CARE PROVIDER_API CALL
Garrett Johnson)  Orthopaedic Sports Medicine; Orthopaedic Surgery  [address] [address]  Phone: [phone]  Fax: [phone]  Follow Up Time:

## 2022-09-22 ENCOUNTER — APPOINTMENT (OUTPATIENT)
Dept: ORTHOPEDIC SURGERY | Facility: CLINIC | Age: 37
End: 2022-09-22

## 2022-09-30 ENCOUNTER — OUTPATIENT (OUTPATIENT)
Dept: OUTPATIENT SERVICES | Facility: HOSPITAL | Age: 37
LOS: 1 days | End: 2022-09-30
Payer: MEDICAID

## 2022-09-30 ENCOUNTER — APPOINTMENT (OUTPATIENT)
Dept: FAMILY MEDICINE | Facility: HOSPITAL | Age: 37
End: 2022-09-30

## 2022-09-30 VITALS
DIASTOLIC BLOOD PRESSURE: 60 MMHG | WEIGHT: 166 LBS | BODY MASS INDEX: 31.37 KG/M2 | SYSTOLIC BLOOD PRESSURE: 101 MMHG | RESPIRATION RATE: 16 BRPM | OXYGEN SATURATION: 97 % | HEART RATE: 72 BPM | TEMPERATURE: 98 F

## 2022-09-30 DIAGNOSIS — K92.1 MELENA: ICD-10-CM

## 2022-09-30 DIAGNOSIS — Z98.890 OTHER SPECIFIED POSTPROCEDURAL STATES: Chronic | ICD-10-CM

## 2022-09-30 DIAGNOSIS — E66.9 OBESITY, UNSPECIFIED: ICD-10-CM

## 2022-09-30 DIAGNOSIS — Z00.00 ENCOUNTER FOR GENERAL ADULT MEDICAL EXAMINATION W/OUT ABNORMAL FINDINGS: ICD-10-CM

## 2022-09-30 DIAGNOSIS — Z87.898 PERSONAL HISTORY OF OTHER SPECIFIED CONDITIONS: ICD-10-CM

## 2022-09-30 DIAGNOSIS — N75.0 CYST OF BARTHOLIN'S GLAND: Chronic | ICD-10-CM

## 2022-09-30 DIAGNOSIS — Z86.39 PERSONAL HISTORY OF OTHER ENDOCRINE, NUTRITIONAL AND METABOLIC DISEASE: ICD-10-CM

## 2022-09-30 DIAGNOSIS — S83.511D SPRAIN OF ANTERIOR CRUCIATE LIGAMENT OF RIGHT KNEE, SUBSEQUENT ENCOUNTER: ICD-10-CM

## 2022-09-30 DIAGNOSIS — Z11.1 ENCOUNTER FOR SCREENING FOR RESPIRATORY TUBERCULOSIS: ICD-10-CM

## 2022-09-30 DIAGNOSIS — Z86.19 PERSONAL HISTORY OF OTHER INFECTIOUS AND PARASITIC DISEASES: ICD-10-CM

## 2022-09-30 DIAGNOSIS — Z00.00 ENCOUNTER FOR GENERAL ADULT MEDICAL EXAMINATION WITHOUT ABNORMAL FINDINGS: ICD-10-CM

## 2022-09-30 DIAGNOSIS — Z87.42 PERSONAL HISTORY OF OTHER DISEASES OF THE FEMALE GENITAL TRACT: ICD-10-CM

## 2022-09-30 DIAGNOSIS — S83.8X1A SPRAIN OF OTHER SPECIFIED PARTS OF RIGHT KNEE, INITIAL ENCOUNTER: ICD-10-CM

## 2022-09-30 DIAGNOSIS — Z71.89 OTHER SPECIFIED COUNSELING: ICD-10-CM

## 2022-09-30 DIAGNOSIS — F32.A DEPRESSION, UNSPECIFIED: ICD-10-CM

## 2022-09-30 DIAGNOSIS — R11.10 VOMITING, UNSPECIFIED: ICD-10-CM

## 2022-09-30 DIAGNOSIS — R10.13 EPIGASTRIC PAIN: ICD-10-CM

## 2022-09-30 PROCEDURE — G0463: CPT

## 2022-09-30 RX ORDER — ALPRAZOLAM 0.25 MG/1
0.25 TABLET ORAL
Qty: 4 | Refills: 0 | Status: DISCONTINUED | COMMUNITY
Start: 2022-01-19 | End: 2022-09-30

## 2022-10-01 DIAGNOSIS — E66.9 OBESITY, UNSPECIFIED: ICD-10-CM

## 2022-10-01 DIAGNOSIS — F32.A DEPRESSION, UNSPECIFIED: ICD-10-CM

## 2022-10-01 DIAGNOSIS — F41.1 GENERALIZED ANXIETY DISORDER: ICD-10-CM

## 2022-10-01 DIAGNOSIS — J45.909 UNSPECIFIED ASTHMA, UNCOMPLICATED: ICD-10-CM

## 2022-10-01 DIAGNOSIS — K92.1 MELENA: ICD-10-CM

## 2022-10-02 PROBLEM — E66.9 OBESITY: Status: ACTIVE | Noted: 2022-09-30

## 2022-10-02 NOTE — PHYSICAL EXAM
[No Acute Distress] : no acute distress [Well Nourished] : well nourished [Well Developed] : well developed [Well-Appearing] : well-appearing [Normal Sclera/Conjunctiva] : normal sclera/conjunctiva [PERRL] : pupils equal round and reactive to light [EOMI] : extraocular movements intact [Normal Outer Ear/Nose] : the outer ears and nose were normal in appearance [Normal Oropharynx] : the oropharynx was normal [No JVD] : no jugular venous distention [No Lymphadenopathy] : no lymphadenopathy [Supple] : supple [Thyroid Normal, No Nodules] : the thyroid was normal and there were no nodules present [No Respiratory Distress] : no respiratory distress  [No Accessory Muscle Use] : no accessory muscle use [Clear to Auscultation] : lungs were clear to auscultation bilaterally [Normal Rate] : normal rate  [Regular Rhythm] : with a regular rhythm [Normal S1, S2] : normal S1 and S2 [No Murmur] : no murmur heard [No Carotid Bruits] : no carotid bruits [No Abdominal Bruit] : a ~M bruit was not heard ~T in the abdomen [No Varicosities] : no varicosities [Pedal Pulses Present] : the pedal pulses are present [No Edema] : there was no peripheral edema [No Palpable Aorta] : no palpable aorta [No Extremity Clubbing/Cyanosis] : no extremity clubbing/cyanosis [Soft] : abdomen soft [No HSM] : no HSM [Normal Bowel Sounds] : normal bowel sounds [Normal Posterior Cervical Nodes] : no posterior cervical lymphadenopathy [Normal Anterior Cervical Nodes] : no anterior cervical lymphadenopathy [No CVA Tenderness] : no CVA  tenderness [No Spinal Tenderness] : no spinal tenderness [No Joint Swelling] : no joint swelling [Grossly Normal Strength/Tone] : grossly normal strength/tone [No Rash] : no rash [Coordination Grossly Intact] : coordination grossly intact [No Focal Deficits] : no focal deficits [Normal Gait] : normal gait [Deep Tendon Reflexes (DTR)] : deep tendon reflexes were 2+ and symmetric [Normal Affect] : the affect was normal [Normal Insight/Judgement] : insight and judgment were intact [Non Tender] : non-tender [Non-distended] : non-distended

## 2022-10-03 PROBLEM — Z87.42 HISTORY OF VAGINAL DISCHARGE: Status: RESOLVED | Noted: 2021-09-23 | Resolved: 2022-10-03

## 2022-10-03 PROBLEM — Z71.89 GRIEF COUNSELING: Status: RESOLVED | Noted: 2022-01-19 | Resolved: 2022-10-03

## 2022-10-03 PROBLEM — R11.10 VOMITING ALONE: Status: RESOLVED | Noted: 2021-07-21 | Resolved: 2022-10-03

## 2022-10-03 PROBLEM — F32.A DEPRESSION, UNSPECIFIED DEPRESSION TYPE: Status: ACTIVE | Noted: 2020-01-07

## 2022-10-03 PROBLEM — Z11.1 PPD SCREENING TEST: Status: RESOLVED | Noted: 2021-10-20 | Resolved: 2022-10-03

## 2022-10-03 PROBLEM — S83.8X1A ACUTE MEDIAL MENISCAL INJURY OF RIGHT KNEE, INITIAL ENCOUNTER: Status: RESOLVED | Noted: 2020-12-26 | Resolved: 2022-10-03

## 2022-10-03 PROBLEM — R10.13 DYSPEPSIA: Status: RESOLVED | Noted: 2021-10-20 | Resolved: 2022-10-03

## 2022-10-03 PROBLEM — Z86.39 HISTORY OF GOITER: Status: RESOLVED | Noted: 2020-01-22 | Resolved: 2022-10-03

## 2022-10-03 PROBLEM — Z87.898 HISTORY OF DYSURIA: Status: RESOLVED | Noted: 2021-03-31 | Resolved: 2022-10-03

## 2022-10-03 PROBLEM — Z00.00 ANNUAL PHYSICAL EXAM: Status: ACTIVE | Noted: 2022-10-02

## 2022-10-03 PROBLEM — K92.1 BLOODY STOOLS: Status: ACTIVE | Noted: 2022-09-30

## 2022-10-03 PROBLEM — S83.511D RUPTURE OF ANTERIOR CRUCIATE LIGAMENT OF RIGHT KNEE, SUBSEQUENT ENCOUNTER: Status: RESOLVED | Noted: 2021-10-20 | Resolved: 2022-10-03

## 2022-10-03 PROBLEM — Z86.19 HISTORY OF CANDIDIASIS OF VAGINA: Status: RESOLVED | Noted: 2021-11-21 | Resolved: 2022-10-03

## 2022-10-03 PROBLEM — Z87.42 HISTORY OF VAGINAL PRURITUS: Status: RESOLVED | Noted: 2021-09-23 | Resolved: 2022-10-03

## 2022-10-03 NOTE — HISTORY OF PRESENT ILLNESS
[FreeTextEntry1] : CPE [de-identified] : 37 yo F, PMH of obesity, depression presents for CPE. Pt states has  hx of anxiety and depression, states never took medication because worried about side effects. Pt was talking to  [facility] previously but pt t request to see  outside of practice. Pt was in ED 9/2 with R knee pain after fall, had appt with ortho 9/22 but did not go to appt. Pt c/o of mucus and blood in the stool, states notices blood in toilet. She has had hx of blood in stool before, had colonoscopy in 2013 which showed nonbleeding internal hemorrhoids which were thought to be likely source of bleeding. Pt due for colonoscopy next year. Pt mentions has hx of asthma, does not use rescue inhaler but would like to have one to use. Denies fever, chills, chest pain, SOB, N/V, abdominal pain, headache, cough, palpitations, leg pain, dizziness, weakness

## 2022-10-03 NOTE — REVIEW OF SYSTEMS
[Constipation] : constipation [Joint Pain] : joint pain [Negative] : Heme/Lymph [FreeTextEntry9] : Right knee swelling, bilateral knee pain

## 2022-10-03 NOTE — HEALTH RISK ASSESSMENT
[Good] : ~his/her~  mood as  good [Current] : Current [20 or more] : 20 or more [Yes] : Yes [2 - 3 times a week (3 pts)] : 2 - 3  times a week (3 points) [1 or 2 (0 pts)] : 1 or 2 (0 points) [Never (0 pts)] : Never (0 points) [0] : 2) Feeling down, depressed, or hopeless: Not at all (0) [PHQ-2 Negative - No further assessment needed] : PHQ-2 Negative - No further assessment needed [Patient reported PAP Smear was normal] : Patient reported PAP Smear was normal [With Family] : lives with family [Employed] : employed [Significant Other] : lives with significant other [Sexually Active] : sexually active [Feels Safe at Home] : Feels safe at home [FreeTextEntry2] :  facility

## 2022-10-22 ENCOUNTER — APPOINTMENT (OUTPATIENT)
Dept: ORTHOPEDIC SURGERY | Facility: CLINIC | Age: 37
End: 2022-10-22

## 2022-10-22 NOTE — HISTORY OF PRESENT ILLNESS
[de-identified] : Ms. LD BRAY is a 36 year female who presents to office complaining of right knee pain.\par \par All review of systems, family history, social history, surgical history, past medical history, medications, and allergies not previously stated as positive are negative. They were reviewed by me today with the patient and documented accordingly.

## 2022-10-22 NOTE — PHYSICAL EXAM
[de-identified] : Right Knee: No obvious deformities, atrophy, ecchymosis, or swelling/effusion. Negative tenderness to medial/lateral joint lines, patella, popliteal fossa, distal quadriceps. patellar tendon, or tibial tubercle. Normal active and passive range of motion, including flexion to 130 degrees and extension to 0 degrees. Negative Anterior/Posterior Drawer, Lachman's, Fitz's, Pivot Shift, and Valgus/Varus Stress tests. Neurovascular status is intact.\par \par Otherwise, no apparent distress. Alert and oriented x 3. Normal mood and affect. No atrophy or swelling. 5 out of 5 motor strength. Sensation is intact and symmetrical. Normal deep tendon reflexes. Full range of motion of shoulder, elbows, hips, and knees (flexion, extension, and rotation). No palpatory tenderness or palpable lymph nodes. Negative straight leg raise. Normal finger-to-nose test. No pathological reflexes. Normal ambulation. No upper or lower extremity instability. [de-identified] : ACC: [id number] EXAM: XR ANKLE COMP MIN 3 VIEWS RT\par ACC: [id number] EXAM: XR KNEE AP LAT OBL 3 VIEWS RT\par \par PROCEDURE DATE: 09/02/2022\par \par INTERPRETATION: Right knee and right ankle. Patient had a fall with local trauma.\par \par Right knee. 3 views. 4 images.\par \par No knee effusion. Mild degeneration.\par \par Incidental bone island in the upper tibia. No fracture.\par \par Findings are similar to December 1, 2020.\par \par Right ankle. 3 views. There is slight degeneration around the malleolar tips. No bone destruction or fracture.\par \par IMPRESSION: No acute findings.\par \par MAIA HERNANDEZ MD; Attending Radiologist\par This document has been electronically signed. Sep 2 2022 9:06AM

## 2022-10-22 NOTE — DISCUSSION/SUMMARY
[de-identified] : Diagnosis\par Medication Rx?\par Physical Therapy ordered?\par MRI ordered?\par Brace/splint/cast/crutches given?\par Cortisone injection or aspiration done?\par Pain management referral?\par Follow up with  ?\par All options discussed with patient.\par All questions by patient answered to their satisfaction.\par Patient expresses full understanding and agreement with plan.\par Patient is happy with the office visit.

## 2022-10-26 ENCOUNTER — EMERGENCY (EMERGENCY)
Facility: HOSPITAL | Age: 37
LOS: 1 days | Discharge: HOME | End: 2022-10-26
Attending: INTERNAL MEDICINE | Admitting: INTERNAL MEDICINE
Payer: MEDICAID

## 2022-10-26 VITALS
DIASTOLIC BLOOD PRESSURE: 76 MMHG | WEIGHT: 164.91 LBS | HEART RATE: 88 BPM | RESPIRATION RATE: 17 BRPM | SYSTOLIC BLOOD PRESSURE: 120 MMHG | OXYGEN SATURATION: 97 % | HEIGHT: 62 IN | TEMPERATURE: 98 F

## 2022-10-26 DIAGNOSIS — N75.0 CYST OF BARTHOLIN'S GLAND: Chronic | ICD-10-CM

## 2022-10-26 DIAGNOSIS — Z98.890 OTHER SPECIFIED POSTPROCEDURAL STATES: Chronic | ICD-10-CM

## 2022-10-26 PROCEDURE — 73590 X-RAY EXAM OF LOWER LEG: CPT

## 2022-10-26 PROCEDURE — 73660 X-RAY EXAM OF TOE(S): CPT | Mod: 26,LT

## 2022-10-26 PROCEDURE — 73552 X-RAY EXAM OF FEMUR 2/>: CPT | Mod: 26,RT

## 2022-10-26 PROCEDURE — 73070 X-RAY EXAM OF ELBOW: CPT

## 2022-10-26 PROCEDURE — 99284 EMERGENCY DEPT VISIT MOD MDM: CPT

## 2022-10-26 PROCEDURE — 73562 X-RAY EXAM OF KNEE 3: CPT | Mod: 26,LT,RT

## 2022-10-26 PROCEDURE — 73070 X-RAY EXAM OF ELBOW: CPT | Mod: 26,RT

## 2022-10-26 PROCEDURE — 73552 X-RAY EXAM OF FEMUR 2/>: CPT

## 2022-10-26 PROCEDURE — 73660 X-RAY EXAM OF TOE(S): CPT

## 2022-10-26 PROCEDURE — 73562 X-RAY EXAM OF KNEE 3: CPT

## 2022-10-26 PROCEDURE — 73590 X-RAY EXAM OF LOWER LEG: CPT | Mod: 26,RT

## 2022-10-26 RX ORDER — OXYCODONE AND ACETAMINOPHEN 5; 325 MG/1; MG/1
1 TABLET ORAL EVERY 6 HOURS
Refills: 0 | Status: COMPLETED | OUTPATIENT
Start: 2022-10-26 | End: 2022-11-02

## 2022-10-26 RX ADMIN — OXYCODONE AND ACETAMINOPHEN 1 TABLET(S): 5; 325 TABLET ORAL at 14:12

## 2022-10-26 RX ADMIN — OXYCODONE AND ACETAMINOPHEN 1 TABLET(S): 5; 325 TABLET ORAL at 15:00

## 2022-10-26 NOTE — ED ADULT NURSE NOTE - NSICDXPASTSURGICALHX_GEN_ALL_CORE_FT
PAST SURGICAL HISTORY:  Bartholin cyst in november, had drainage at [facility]    History of repair of ACL right

## 2022-10-26 NOTE — ED PROVIDER NOTE - PATIENT PORTAL LINK FT
You can access the FollowMyHealth Patient Portal offered by [facility] by registering at the following website: [email or web address]. By joining [employer]’s FollowMyHealth portal, you will also be able to view your health information using other applications (apps) compatible with our system.
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
## 2022-10-26 NOTE — ED PROVIDER NOTE - PHYSICAL EXAMINATION
spine- no bony tenderness, positive ROM   pt ambulating but with pain   right leg : + generalized tenderness to knee, femur, pain on ROM, 2+ pedal pulses, less than 2 sec cap refill   left knee:+ abrasion with posterior tenderness, positive 2+ pedal pulse, + ecchymosis to 1st and 2nd toe
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
## 2022-10-26 NOTE — ED PROVIDER NOTE - NS ED ATTENDING STATEMENT MOD
This was a shared visit with the SHELBI. I reviewed and verified the documentation and independently performed the documented:
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
## 2022-10-26 NOTE — ED PROVIDER NOTE - NSFOLLOWUPINSTRUCTIONS_ED_ALL_ED_FT
Rest, Ice, elevate   Take motrin 600mg every 6 hours as needed for pain   Use knee immobilizer for support   Follow up with orthopedics.     Knee Sprain       A knee sprain is a stretch or tear in a knee ligament. Knee ligaments are tissues that connect bones in the knee to each other.      What are the causes?    This condition often results from:  •A fall.       •An injury to the knee.        What are the signs or symptoms?    Symptoms of this condition include:  •Trouble straightening or bending the leg.       •Swelling in the knee.       •Bruising around the knee.       •Tenderness or pain in the knee.       •Sudden muscle tightening (spasms) around the knee.        How is this treated?    Treatment for this condition may involve:  •Keeping the knee still (immobilized) with a cast, brace, or splint.      •Putting ice on the knee. This helps with pain and swelling.      •Raising (elevating) the knee above the level of your heart when you are resting. This helps with pain and swelling.      •Taking medicine for pain.       •Doing exercises to keep your knee from being weak or stiff.      •Having surgery. This may be needed if the ligament is completely torn.        Follow these instructions at home:    If you have a splint or brace:     •Wear it as told by your doctor. Remove it only as told by your doctor.      •Check the skin around it every day. Tell your doctor about any concerns.    •Loosen it if your toes:  •Tingle.      •Become numb.      •Turn cold and blue.        •Keep it clean and dry.      If you have a cast:     • Do not stick anything inside it to scratch your skin.      •Check the skin around it every day. Tell your doctor about any concerns.      •You may put lotion on dry skin around the edges of the cast. Do not put lotion on the skin under the cast.      •Keep it clean and dry.      Bathing     If you have a splint, brace, or cast that is not waterproof:  •Do not let it get wet.      •Cover it with a watertight covering when you take a bath or a shower.        Managing pain, stiffness, and swelling  •If told, put ice on the injured area. To do this:  •If you have a removable splint or brace, remove it as told by your doctor.      •Put ice in a plastic bag.      •Place a towel between your skin and the bag or between your cast and the bag.      •Leave the ice on for 20 minutes, 2–3 times a day.        •Move your toes often to reduce stiffness and swelling.      •Raise the injured area above the level of your heart while you are sitting or lying down.      General instructions    •Take over-the-counter and prescription medicines only as told by your doctor.      •Do not use any products that contain nicotine or tobacco, such as cigarettes, e-cigarettes, and chewing tobacco. These can delay healing. If you need help quitting, ask your doctor.      •Do exercises as told by your doctor.      •Keep all follow-up visits as told by your doctor. This is important.        Contact a doctor if:    •Your pain gets worse.      •The cast, brace, or splint does not fit right.      •The cast, brace, or splint gets damaged.        Get help right away if:    •You cannot use your knee to support your body weight (bear weight) for standing or walking.      •You cannot move the injured area.      •Your knee jovany or you have pain after you walk only a few steps.      •You have very bad pain, swelling, or numbness in your leg below the cast, brace, or splint.      •Your foot or toes are numb, cold, or blue after loosening your splint or brace.        Summary    •A knee sprain is a stretch or tear in a tissue (ligament) that connects your knee bones to each other.      •You may need to wear a splint, brace, or cast to keep the knee still while it is getting better.      •Surgery may be needed if the ligament is completely torn.      This information is not intended to replace advice given to you by your health care provider. Make sure you discuss any questions you have with your health care provider.      Document Revised: 11/06/2020 Document Reviewed: 11/06/2020    Elsevier Patient Education © 2022 [employer]

## 2022-10-26 NOTE — ED PROVIDER NOTE - CPE EDP SKIN NORM
PREP instructions mailed to patient.    Name: Nichole Helton MRN: [id number]       [facility] [id number]   Date: 2/19/2022 Status: Jeny   Appt Time: 1:45 PM Length: 15   Visit Type: LAB VISIT [101] Copay: $0.00   Provider: [facility] Department: [facility]   :         Video:       Bill Area:      Referral Number:   Referral Status:     Notes: Presurgical COVID test/JEROME DOS 2/22   Made On: 1/6/2022 9:12 AM By: DEVON HERNANDEZ [[id number]] (ES)        normal...

## 2022-10-26 NOTE — ED ADULT NURSE NOTE - PAIN RATING/NUMBER SCALE (0-10): REST
Suyapa Schultz, a  at 37w1d with an SANDIE of 2021, Date entered prior to episode creation, was seen at [facility] for a nonstress test.    Chief Complaint   Patient presents with   • Abdominal Cramping     PRESENTS VIA W/C TO L&D WITH C/O CRAMPING SINCE 0600, THAT WORSENED AT 1400. DENIES VB OR LOF. STATES INC DISCHARGE. STATES BABY IS MOVING WELL.        Patient Active Problem List   Diagnosis   • Gallstones   • Pregnancy   • Pregnant   • Iron deficiency anemia, unspecified       Start Time:   Stop Time:     Interpretation A  Nonstress Test Interpretation A: Reactive (21 : Laxmi Ledesma, RN)  Comments A: VERIFIED PER AL GREGORY RN. (21 : Laxmi Ledesma, RN)           7
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
## 2022-10-27 ENCOUNTER — APPOINTMENT (OUTPATIENT)
Dept: ORTHOPEDIC SURGERY | Facility: CLINIC | Age: 37
End: 2022-10-27

## 2022-10-27 VITALS — WEIGHT: 165 LBS | HEIGHT: 62 IN | BODY MASS INDEX: 30.36 KG/M2

## 2022-10-27 PROCEDURE — 73564 X-RAY EXAM KNEE 4 OR MORE: CPT | Mod: RT

## 2022-10-27 PROCEDURE — 99214 OFFICE O/P EST MOD 30 MIN: CPT

## 2022-11-03 NOTE — ED PROVIDER NOTE - WR INTERPRETATION 2
MSK XR negative - No fracture, No dislocation, No foreign body No palpable anterior cervical lymph nodes.

## 2022-11-07 ENCOUNTER — APPOINTMENT (OUTPATIENT)
Dept: MRI IMAGING | Facility: HOSPITAL | Age: 37
End: 2022-11-07

## 2022-11-07 ENCOUNTER — OUTPATIENT (OUTPATIENT)
Dept: OUTPATIENT SERVICES | Facility: HOSPITAL | Age: 37
LOS: 1 days | End: 2022-11-07
Payer: MEDICAID

## 2022-11-07 DIAGNOSIS — S83.511A SPRAIN OF ANTERIOR CRUCIATE LIGAMENT OF RIGHT KNEE, INITIAL ENCOUNTER: ICD-10-CM

## 2022-11-07 DIAGNOSIS — M71.21 SYNOVIAL CYST OF POPLITEAL SPACE [BAKER], RIGHT KNEE: ICD-10-CM
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
## 2022-11-07 DIAGNOSIS — Z98.890 OTHER SPECIFIED POSTPROCEDURAL STATES: Chronic | ICD-10-CM

## 2022-11-07 DIAGNOSIS — M25.461 EFFUSION, RIGHT KNEE: ICD-10-CM

## 2022-11-07 DIAGNOSIS — M23.91 UNSPECIFIED INTERNAL DERANGEMENT OF RIGHT KNEE: ICD-10-CM

## 2022-11-07 DIAGNOSIS — N75.0 CYST OF BARTHOLIN'S GLAND: Chronic | ICD-10-CM
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
## 2022-11-07 PROBLEM — S83.519A SPRAIN OF ANTERIOR CRUCIATE LIGAMENT OF UNSPECIFIED KNEE, INITIAL ENCOUNTER: Chronic | Status: ACTIVE | Noted: 2022-10-26

## 2022-11-07 PROCEDURE — 73721 MRI JNT OF LWR EXTRE W/O DYE: CPT

## 2022-11-07 PROCEDURE — 73721 MRI JNT OF LWR EXTRE W/O DYE: CPT | Mod: 26,RT

## 2022-11-10 ENCOUNTER — APPOINTMENT (OUTPATIENT)
Dept: ORTHOPEDIC SURGERY | Facility: CLINIC | Age: 37
End: 2022-11-10

## 2022-11-10 PROCEDURE — 99214 OFFICE O/P EST MOD 30 MIN: CPT

## 2023-02-20 ENCOUNTER — EMERGENCY (EMERGENCY)
Facility: HOSPITAL | Age: 38
LOS: 1 days | Discharge: HOME | End: 2023-02-20
Attending: EMERGENCY MEDICINE | Admitting: EMERGENCY MEDICINE
Payer: MEDICAID

## 2023-02-20 VITALS
RESPIRATION RATE: 18 BRPM | OXYGEN SATURATION: 98 % | DIASTOLIC BLOOD PRESSURE: 68 MMHG | HEIGHT: 62 IN | HEART RATE: 112 BPM | SYSTOLIC BLOOD PRESSURE: 132 MMHG | TEMPERATURE: 98 F | WEIGHT: 167.99 LBS

## 2023-02-20 DIAGNOSIS — N75.0 CYST OF BARTHOLIN'S GLAND: Chronic | ICD-10-CM

## 2023-02-20 DIAGNOSIS — Z98.890 OTHER SPECIFIED POSTPROCEDURAL STATES: Chronic | ICD-10-CM

## 2023-02-20 PROCEDURE — 70486 CT MAXILLOFACIAL W/O DYE: CPT | Mod: 26,MA

## 2023-02-20 PROCEDURE — 99284 EMERGENCY DEPT VISIT MOD MDM: CPT

## 2023-02-20 PROCEDURE — 70486 CT MAXILLOFACIAL W/O DYE: CPT | Mod: MA

## 2023-02-20 PROCEDURE — 73562 X-RAY EXAM OF KNEE 3: CPT

## 2023-02-20 PROCEDURE — 73562 X-RAY EXAM OF KNEE 3: CPT | Mod: 26,RT

## 2023-02-20 RX ORDER — OXYCODONE AND ACETAMINOPHEN 5; 325 MG/1; MG/1
1 TABLET ORAL
Qty: 8 | Refills: 0
Start: 2023-02-20

## 2023-02-20 RX ORDER — IBUPROFEN 200 MG
800 TABLET ORAL ONCE
Refills: 0 | Status: COMPLETED | OUTPATIENT
Start: 2023-02-20 | End: 2023-02-20

## 2023-02-20 RX ORDER — IBUPROFEN 200 MG
1 TABLET ORAL
Qty: 30 | Refills: 0
Start: 2023-02-20

## 2023-02-20 RX ADMIN — Medication 800 MILLIGRAM(S): at 02:20

## 2023-02-20 NOTE — ED PROVIDER NOTE - NSICDXPASTSURGICALHX_GEN_ALL_CORE_FT
PAST SURGICAL HISTORY:  Bartholin cyst in november, had drainage at [facility]    History of repair of ACL right

## 2023-02-20 NOTE — ED PROVIDER NOTE - PATIENT PORTAL LINK FT
You can access the FollowMyHealth Patient Portal offered by [facility] by registering at the following website: [email or web address]. By joining [email or web address]’s FollowMyHealth portal, you will also be able to view your health information using other applications (apps) compatible with our system.
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
## 2023-02-20 NOTE — ED PROVIDER NOTE - NSFOLLOWUPINSTRUCTIONS_ED_ALL_ED_FT
- take ibuprofen for pain as directed  - take percocet in addition for stronger pain    Follow Up in 1-3 Days with your own doctor or with  [address] [address]  Phone: [phone]    see orthopedist this week     wear knee immobilizer brace. use crutches      Contusion in Adults    WHAT YOU NEED TO KNOW:    A contusion is a bruise that appears on your skin after an injury. A bruise happens when small blood vessels tear but skin does not. Blood leaks into nearby tissue, such as soft tissue or muscle.    DISCHARGE INSTRUCTIONS:    Return to the emergency department if:   •You have new trouble moving the injured area.      •You have tingling or numbness in or near the injured area.      •Your hand or foot below the bruise gets cold or turns pale.       Call your doctor if:   •You find a new lump in the injured area.      •Your symptoms do not improve with treatment after 4 to 5 days.      •You have questions or concerns about your condition or care.      Medicines: You may need any of the following:   •NSAIDs help decrease swelling and pain or fever. This medicine is available with or without a doctor's order. NSAIDs can cause stomach bleeding or kidney problems in certain people. If you take blood thinner medicine, always ask your healthcare provider if NSAIDs are safe for you. Always read the medicine label and follow directions.      •Prescription pain medicine may be given. Ask your healthcare provider how to take this medicine safely. Some prescription pain medicines contain acetaminophen. Do not take other medicines that contain acetaminophen without talking to your healthcare provider. Too much acetaminophen may cause liver damage. Prescription pain medicine may cause constipation. Ask your healthcare provider how to prevent or treat constipation.       •Take your medicine as directed. Contact your healthcare provider if you think your medicine is not helping or if you have side effects. Tell him of her if you are allergic to any medicine. Keep a list of the medicines, vitamins, and herbs you take. Include the amounts, and when and why you take them. Bring the list or the pill bottles to follow-up visits. Carry your medicine list with you in case of an emergency.      Help a contusion heal:   •Rest the injured area or use it less than usual. If you bruised your leg or foot, you may need crutches or a cane to help you walk. This will help you keep weight off your injured body part.       •Apply ice to decrease swelling and pain. Ice may also help prevent tissue damage. Use an ice pack, or put crushed ice in a plastic bag. Cover it with a towel and place it on your bruise for 15 to 20 minutes every hour or as directed.      •Use compression to support the area and decrease swelling. Wrap an elastic bandage around the area over the bruised muscle. Make sure the bandage is not too tight. You should be able to fit 1 finger between the bandage and your skin.      •Elevate (raise) your injured body part above the level of your heart to help decrease pain and swelling. Use pillows, blankets, or rolled towels to elevate the area as often as you can.      •Do not drink alcohol as directed. Alcohol may slow healing.      •Do not stretch injured muscles right after your injury. Ask your healthcare provider when and how you may safely stretch after your injury. Gentle stretches can help increase your flexibility.      •Do not massage the area or put heating pads on the bruise right after your injury. Heat and massage may slow healing. Your healthcare provider may tell you to apply heat after several days. At that time, heat will start to help the injury heal.      Prevent another contusion:   •Stretch and warm up before you play sports or exercise.      •Wear protective gear when you play sports. Examples are shin guards and padding.       •If you begin a new physical activity, start slowly to give your body a chance to adjust.      Follow up with your doctor as directed: Write down your questions so you remember to ask them during your visits.        Knee Pain    WHAT YOU NEED TO KNOW:    Knee pain may start suddenly, or it may be a long-term problem. You may have pain on the side, front, or back of your knee. You may have knee stiffness and swelling. You may hear popping sounds or feel like your knee is giving way or locking up as you walk. You may feel pain when you sit, stand, walk, or climb up and down stairs. Knee pain can be caused by conditions such as obesity, inflammation, or strains or tears in ligaments or tendons.     DISCHARGE INSTRUCTIONS:    Return to the emergency department if:   •Your pain is worse, even after treatment.       •You cannot bend or straighten your leg completely.       •The swelling around your knee does not go down even with treatment.      •Your knee is painful and hot to the touch.       Contact your healthcare provider if:   •You have questions or concerns about your condition or care.           Medicines: You may need any of the following:   •NSAIDs help decrease swelling and pain or fever. This medicine is available with or without a doctor's order. NSAIDs can cause stomach bleeding or kidney problems in certain people. If you take blood thinner medicine, always ask your healthcare provider if NSAIDs are safe for you. Always read the medicine label and follow directions.      •Acetaminophen decreases pain and fever. It is available without a doctor's order. Ask how much to take and how often to take it. Follow directions. Read the labels of all other medicines you are using to see if they also contain acetaminophen, or ask your doctor or pharmacist. Acetaminophen can cause liver damage if not taken correctly.      •Prescription pain medicine may be given. Ask your healthcare provider how to take this medicine safely. Some prescription pain medicines contain acetaminophen. Do not take other medicines that contain acetaminophen without talking to your healthcare provider. Too much acetaminophen may cause liver damage. Prescription pain medicine may cause constipation. Ask your healthcare provider how to prevent or treat constipation.       •Take your medicine as directed. Contact your healthcare provider if you think your medicine is not helping or if you have side effects. Tell him or her if you are allergic to any medicine. Keep a list of the medicines, vitamins, and herbs you take. Include the amounts, and when and why you take them. Bring the list or the pill bottles to follow-up visits. Carry your medicine list with you in case of an emergency.      What you can do to manage your symptoms:   •Rest your knee so it can heal. Limit activities that increase your pain. Do low-impact exercises, such as walking or swimming.       •Apply ice to help reduce swelling and pain. Use an ice pack, or put crushed ice in a plastic bag. Cover it with a towel before you apply it to your knee. Apply ice for 15 to 20 minutes every hour, or as directed.      •Apply compression to help reduce swelling. Use a brace or bandage only as directed.      •Elevate your knee to help decrease pain and swelling. Elevate your knee while you are sitting or lying down. Prop your leg on pillows to keep your knee above the level of your heart.      •Prevent your knee from moving as directed. Your healthcare provider may put on a cast or splint. You may need to wear a leg brace to stabilize your knee. A leg brace can be adjusted to increase your range of motion as your knee heals.  Hinged Knee Braces            What you can do to prevent knee pain:   •Maintain a healthy weight. Extra weight increases your risk for knee pain. Ask your healthcare provider how much you should weigh. He or she can help you create a safe weight loss plan if you need to lose weight.      •Exercise or train properly. Use the correct equipment for sports. Wear shoes that provide good support. Check your posture often as you exercise, play sports, or train for an event. This can help prevent stress and strain on your knees. Rest between sessions so you do not overwork your knees.      Follow up with your healthcare provider within 24 hours or as directed: You may need follow-up treatments, such as steroid injections to decrease pain. Write down your questions so you remember to ask them during your visits.      Crutch Instructions    WHAT YOU NEED TO KNOW:    Crutches are tools that provide support and balance when you walk. You may need 1 or 2 crutches to help support your body weight. You may need crutches if you had surgery or an injury that affects your ability to walk.    DISCHARGE INSTRUCTIONS:    How to use crutches safely:   •Support your weight with your arms and hands. Do not support your weight with your armpits. This could hurt the nerves that are in your underarms. Keep your elbow bent when the crutch is in place under your arm.      •Walk slowly and carefully with crutches. Go up and down stairs and ramps slowly, and stop to rest when you feel tired. Get up slowly to a sitting or standing position. This will help prevent dizziness and fainting. Use your crutches only on firm ground. Use caution when you walk on ice or snow. Wet or waxed floors and smooth cement floors can be slippery. Watch out for small rugs or cords.       How to walk with crutches:   •Place both crutches under your arms, and place your hands on the hand  of the crutches. Place your crutches slightly in front of you.       •The top of the crutches should be about 2 fingers [id number] (about 1½ inches) below your armpits. Place your weight on your hands. The top of the crutches should not press into your armpits.      •If you have one leg that is injured, keep it off the floor by bending your knee.      •Lift the crutches and move them a step ahead of you. Put the rubber ends of the crutches firmly on the ground. Move the foot that is not injured between the crutches. Place that heel down first.      •If you are using your crutches for balance, move your right foot and left crutch forward. Then move your left foot and right crutch forward. Keep walking this way.  Walking with Crutches           How to go up stairs with crutches:   •Face the stairs. Put the crutches close to the first step.      •Push onto the crutches and put your uninjured leg on the first step.      •Put your weight on your uninjured leg that is on the first step. Bring both crutches and the injured leg onto the step at the same time.      •When you hold onto a railing with one arm, put both crutches under the other arm. Use the railing to help you go up stairs.   Crutches Going Upstairs With Crutches           How to go down stairs with crutches:   •Stand with the toes of your uninjured leg close to the edge of the step.      •Bend the knee of your uninjured leg. Slowly lower both crutches along with the injured leg onto the next step.       •Lean on your crutches. Slowly lower your uninjured leg onto the same step.      •Place both crutches under one arm while you hold onto the railing with the other arm.  Crutches Going Downstairs with Crutches           How to sit in a chair with crutches:   •Turn and back up to the chair until you feel the edge of it against the back of your legs. Keep your injured leg forward.      •Take your crutches out from under your arms. Sit while bending your uninjured knee.  Crutches Sitting Down with Crutches           How to get up from a chair with crutches:   •Sit on the edge of your chair.       •Push up with your hands using the crutches or arms of the chair. Put your weight on your uninjured foot as you get up.      •Keep your injured leg bent at the knee and off the floor.  Crutches Standing Up with Crutches           Contact your healthcare provider if:   •Your crutches do not fit.      •One crutch is longer than the other.      •Your crutches break or get lost.      •The rubber tips of your crutches are split or loose.      •You get blisters or painful calluses on your hands or armpits.      •Your armpit is red, sore, or has bumps or pimples.       •Your arm muscles get weaker the longer you use the crutches.      •You have questions or concerns about your condition or care.      Return to the emergency department if:   •You have sudden numbness in a hand or arm.      •Your fingers feel cold or have cramping pain.

## 2023-02-20 NOTE — ED PROVIDER NOTE - CARE PROVIDER_API CALL
Ric Euceda)  Orthopedics  [address] [facility]  [place], [place] [id number]  Phone: [phone]  Fax: [phone]  Follow Up Time: 1-3 Days
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
## 2023-02-20 NOTE — ED ADULT NURSE REASSESSMENT NOTE - NS ED NURSE REASSESS COMMENT FT1
Patient educated on the use of crutches. Patient successfully demonstrated appropriate use of crutches.
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
## 2023-02-22 ENCOUNTER — NON-APPOINTMENT (OUTPATIENT)
Age: 38
End: 2023-02-22

## 2023-02-23 ENCOUNTER — NON-APPOINTMENT (OUTPATIENT)
Age: 38
End: 2023-02-23

## 2023-02-23 ENCOUNTER — APPOINTMENT (OUTPATIENT)
Dept: ORTHOPEDIC SURGERY | Facility: CLINIC | Age: 38
End: 2023-02-23
Payer: MEDICAID

## 2023-02-23 PROCEDURE — 73562 X-RAY EXAM OF KNEE 3: CPT | Mod: LT

## 2023-02-23 PROCEDURE — 99213 OFFICE O/P EST LOW 20 MIN: CPT

## 2023-02-27 ENCOUNTER — NON-APPOINTMENT (OUTPATIENT)
Age: 38
End: 2023-02-27
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
## 2023-03-02 ENCOUNTER — NON-APPOINTMENT (OUTPATIENT)
Age: 38
End: 2023-03-02

## 2023-03-03 ENCOUNTER — NON-APPOINTMENT (OUTPATIENT)
Age: 38
End: 2023-03-03

## 2023-03-10 ENCOUNTER — NON-APPOINTMENT (OUTPATIENT)
Age: 38
End: 2023-03-10

## 2023-03-16 ENCOUNTER — APPOINTMENT (OUTPATIENT)
Dept: FAMILY MEDICINE | Facility: HOSPITAL | Age: 38
End: 2023-03-16

## 2023-03-16 ENCOUNTER — OUTPATIENT (OUTPATIENT)
Dept: OUTPATIENT SERVICES | Facility: HOSPITAL | Age: 38
LOS: 1 days | End: 2023-03-16
Payer: MEDICAID

## 2023-03-16 DIAGNOSIS — Z20.822 CONTACT WITH AND (SUSPECTED) EXPOSURE TO COVID-19: ICD-10-CM

## 2023-03-16 DIAGNOSIS — Z00.00 ENCOUNTER FOR GENERAL ADULT MEDICAL EXAMINATION WITHOUT ABNORMAL FINDINGS: ICD-10-CM

## 2023-03-16 DIAGNOSIS — Z98.890 OTHER SPECIFIED POSTPROCEDURAL STATES: Chronic | ICD-10-CM

## 2023-03-16 DIAGNOSIS — N75.0 CYST OF BARTHOLIN'S GLAND: Chronic | ICD-10-CM

## 2023-03-16 PROCEDURE — G0463: CPT

## 2023-03-20 DIAGNOSIS — Z20.822 CONTACT WITH AND (SUSPECTED) EXPOSURE TO COVID-19: ICD-10-CM

## 2023-03-22 ENCOUNTER — NON-APPOINTMENT (OUTPATIENT)
Age: 38
End: 2023-03-22

## 2023-03-24 ENCOUNTER — NON-APPOINTMENT (OUTPATIENT)
Age: 38
End: 2023-03-24
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
## 2023-04-05 ENCOUNTER — NON-APPOINTMENT (OUTPATIENT)
Age: 38
End: 2023-04-05

## 2023-04-06 ENCOUNTER — NON-APPOINTMENT (OUTPATIENT)
Age: 38
End: 2023-04-06

## 2023-04-28 ENCOUNTER — NON-APPOINTMENT (OUTPATIENT)
Age: 38
End: 2023-04-28

## 2023-05-01 ENCOUNTER — APPOINTMENT (OUTPATIENT)
Dept: ORTHOPEDIC SURGERY | Facility: CLINIC | Age: 38
End: 2023-05-01
Payer: MEDICAID

## 2023-05-01 ENCOUNTER — NON-APPOINTMENT (OUTPATIENT)
Age: 38
End: 2023-05-01

## 2023-05-01 VITALS — HEIGHT: 62 IN | WEIGHT: 170 LBS | BODY MASS INDEX: 31.28 KG/M2

## 2023-05-01 DIAGNOSIS — Z01.818 ENCOUNTER FOR OTHER PREPROCEDURAL EXAMINATION: ICD-10-CM

## 2023-05-01 PROCEDURE — 73564 X-RAY EXAM KNEE 4 OR MORE: CPT | Mod: LT,RT

## 2023-05-01 PROCEDURE — 99214 OFFICE O/P EST MOD 30 MIN: CPT

## 2023-05-01 NOTE — ADDENDUM
[FreeTextEntry1] : I, Jean Claude Garcia, acted solely as a scribe for Dr. Garrett Johnson on this date 05/01/2023.\par \par All medical record entries made by the Scribe were at my, Dr. Garrett Johnson's, direction and personally dictated by me on 05/01/2023. I have reviewed the chart and agree that the record accurately reflects my personal performance of the history, physical exam, assessment and plan. I have also personally directed, reviewed, and agreed with the chart.\par

## 2023-05-01 NOTE — PHYSICAL EXAM
[Slightly Antalgic] : slightly antalgic [Normal RLE] : Right Lower Extremity: No scars, rashes, lesions, ulcers, skin intact [Normal LLE] : Left Lower Extremity: No scars, rashes, lesions, ulcers, skin intact [Normal Touch] : sensation intact for touch [Normal] : Oriented to person, place, and time, insight and judgement were intact and the affect was normal [de-identified] : Right Lower Extremity\par o Knee :\par ¦ Inspection/Palpation : lateral tenderness, no medial joint line tenderness, no effusion, no deformity\par ¦ Range of Motion : 0 - 115 degrees, no crepitus\par ¦ Stability : no valgus or varus instability present on provocative testing, Lachman’s Test (2+), Anterior Drawer (1+)\par ¦ Strength : quad 4+/5 with pain\par o Muscle Bulk : normal muscle bulk present\par o Skin : no erythema, no ecchymosis\par o Sensation : sensation to pin intact\par o Vascular Exam : no edema, no cyanosis, dorsalis pedis artery pulse 2+, posterior tibial artery pulse 2+\par \par Left Lower Extremity\par o Knee :\par ¦ Inspection/Palpation : medial and lateral joint line tenderness, no swelling, no deformity\par ¦ Range of Motion : 0 - 115 degrees, no crepitus\par ¦ Stability : no valgus or varus instability present on provocative testing, Lachman’s Test (-), Anterior Load and Shift (-)\par ¦ Strength : flexion and extension 5/5\par o Muscle Bulk : normal muscle bulk present\par o Skin : no erythema, no ecchymosis\par o Sensation : sensation to pin intact\par o Vascular Exam : no edema, no cyanosis, dorsalis pedis artery pulse 2+, posterior tibial artery pulse 2+ [de-identified] : X-rays obtained on 05/01/2023:\par \par o Right Knee : AP, lateral, sunrise, and Ramesh views of the knee were obtained, there are no soft tissue abnormalities, no fractures, alignment is normal, normal appearing joint spaces, normal bone density, no bony lesions, avulsion of the proximal tibia.\par \par o Left Knee : AP, lateral, sunrise, and Ramesh views of the knee were obtained, there are no soft tissue abnormalities, no fractures, alignment is normal, normal appearing joint spaces, normal bone density, no bony lesions.\par ________________________________\par \par ACC: [id number] EXAM: MR KNEE RT\par \par PROCEDURE DATE: 11/07/2022\par \par INTERPRETATION: Exam Type: MR KNEE RIGHT\par Exam Date and Time: 11/7/2022 1:35 PM\par Indication: History of right ACL tear which has not been repaired. Worsening intermittent right knee pain and swelling with instability described as dull aching and occasionally sharp. Symptoms have been exacerbated where patient heard a "pop".\par Comparison: Bilateral knee radiograph dated 10/26/2022, MR the right knee dated 4/22/2021 and right knee MRI on December 15, 2020.\par \par TECHNIQUE:\par Multiplanar multisequence MRI of the of the right knee was performed.\par \par FINDINGS:\par OSSEOUS STRUCTURES:\par Subchondral impaction injury of the posterior lateral tibial plateau with adjacent marrow edema and to a lesser extent within the posterior medial tibial plateau. Background marrow signal is normal without infiltrative marrow process.\par \par JOINT SPACE:\par Small joint effusion. No intra-articular bodies. Trace Baker's cyst.\par \par MEDIAL COMPARTMENT:\par Medial meniscus: Again seen is a diminutive appearance of the anterior horn, body and posterior horn of the medial meniscus.\par Medial compartment cartilage: No focal cartilage defects.\par \par LATERAL COMPARTMENT:\par Lateral meniscus: Normal in morphology and signal.\par Lateral compartment cartilage: Full-thickness cartilage fissuring at the central femoral weightbearing component with adjacent subchondral edema which is new from prior.\par \par PATELLOFEMORAL COMPARTMENT:\par Patellofemoral compartment cartilage: No focal cartilage defects.\par \par CRUCIATE LIGAMENTS:\par Anterior cruciate ligament (ACL): Redemonstration of a complete tear of the mid substance of the ACL.\par Posterior cruciate ligament (PCL): Normal.\par \par COLLATERAL LIGAMENTS AND POSTEROLATERAL CORNER:\par Medial collateral ligament (MCL): Normal.\par Lateral collateral ligament (LCL) complex: Normal.\par Posterolateral corner structures: Normal.\par \par EXTENSOR MECHANISM:\par Distal quadriceps tendon: No tendinosis or tear.\par Patella tendon: No tendinosis or tear.\par Medial patellofemoral ligament (MPFL) and patellar retinacula: Normal.\par Patellofemoral tracking: No edema in superolateral Hoffa's fat pad. No lateral patellar tilt or translation. No patella dina. Normal patellar and trochlear morphology.\par \par MUSCLES/TENDONS:\par No acute muscle strain or muscle atrophy. Visualized tendons not previously discussed are intact without tendinosis or tear.\par \par SOFT TISSUES:\par No soft tissue mass. Normal popliteal neurovascular bundle.\par \par IMPRESSION:\par 1. Again seen is a complete tear of the ACL with a subchondral impaction injury of the posterior lateral tibial plateau with adjacent marrow edema and to a lesser extent within the posterior medial tibial plateau likely secondary to chronic instability.\par 2. Diminutive appearance of the medial meniscus, unchanged from prior.\par 3. Minimal lateral compartment chondrosis.\par \par --- End of Report ---\par _______________________________\par X ray obtained 10/27/2022: o Right Knee : AP, lateral, sunrise, and Ramesh views of the knee were obtained, there are no soft tissue abnormalities, no fractures, alignment is normal, mild osteoarthritis at the medial compartment, normal bone density, no bony lesions.\par ______________________________\par \par Patient comes to today's visit with outside imaging already performed. I reviewed the images in detail with the patient and discussed the findings as highlighted below. \par \par EXAM: MR KNEE RT\par PROCEDURE DATE: 04/22/2021\par \par INTERPRETATION: EXAMINATION: MRI of the right knee\par \par HISTORY: Right knee pain\par \par TECHNIQUE: Multiplanar, multisequential MR imaging was performed.\par \par Comparison is made to a prior right knee MRI from 12/15/2020\par \par FINDINGS:\par \par Fluid: There is a small joint effusion. There is a very small Baker's cyst with leakage.\par \par Ligaments: There is redemonstrated complete tear of the ACL. The PCL and collateral ligaments are intact.\par \par Medial Compartment: There is redemonstrated marked diminution/attenuation of the inner aspect of the posterior horn of the medial meniscus adjacent to its root attachment. The remainder of the posterior horn and the body of the meniscus is also slightly diminutive. Findings are similar to prior. Articular cartilage is preserved.\par \par Lateral Compartment: No lateral meniscal tear. Preserved articular cartilage.\par \par Patellofemoral Compartment: Preserved articular cartilage.\par \par Extensor Mechanism: No tear of the quadriceps or patellar tendons.\par \par Bones: Previously described areas of marrow edema signal at the lateral femoral condyle and posterior aspect of the tibial plateau have nearly completely resolved.\par \par IMPRESSION: Redemonstrated complete tear of the ACL.\par Redemonstrated diminution of the body and posterior horn of the medial meniscus, similar to prior.\par Small joint effusion.
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
## 2023-05-01 NOTE — DISCUSSION/SUMMARY
[de-identified] : The underlying pathophysiology was reviewed in great detail with the patient as well as the various treatment options, including ice, analgesics, NSAIDs, Physical therapy, steroid injections, functional ACL brace, and surgery.\par \par Activity modifications and restrictions were discussed. I advised avoiding deep bending, squatting and high intensity activity.\par \par The patient wishes to proceed with SURGICAL INTERVENTION at this time. The risks and benefits of a RIGHT KNEE ARTHROSCOPY AND ACL RECONSTRUCTION WITH ALLOGRAFT were discussed in great detail today, including but not limited to bleeding, infection, nerve injury, DVT, allergy to the anesthetic or to the implants, persistent pain, stiffness, scarring, swelling or deformity.\par \par All questions were answered, all alternatives discussed and the patient is in complete agreement with that plan. Follow-up appointment as instructed. Any issues and the patient will call or come in sooner.

## 2023-05-02 ENCOUNTER — APPOINTMENT (OUTPATIENT)
Dept: FAMILY MEDICINE | Facility: HOSPITAL | Age: 38
End: 2023-05-02

## 2023-05-02 PROBLEM — Z01.818 PRE-OP EVALUATION: Status: ACTIVE | Noted: 2023-05-02

## 2023-05-04 ENCOUNTER — NON-APPOINTMENT (OUTPATIENT)
Age: 38
End: 2023-05-04

## 2023-05-11 ENCOUNTER — OUTPATIENT (OUTPATIENT)
Dept: OUTPATIENT SERVICES | Facility: HOSPITAL | Age: 38
LOS: 1 days | End: 2023-05-11
Payer: MEDICAID

## 2023-05-11 VITALS
WEIGHT: 177.25 LBS | HEIGHT: 62 IN | DIASTOLIC BLOOD PRESSURE: 78 MMHG | HEART RATE: 72 BPM | TEMPERATURE: 97 F | OXYGEN SATURATION: 97 % | RESPIRATION RATE: 16 BRPM | SYSTOLIC BLOOD PRESSURE: 122 MMHG

## 2023-05-11 DIAGNOSIS — S83.511A SPRAIN OF ANTERIOR CRUCIATE LIGAMENT OF RIGHT KNEE, INITIAL ENCOUNTER: ICD-10-CM

## 2023-05-11 DIAGNOSIS — Z98.890 OTHER SPECIFIED POSTPROCEDURAL STATES: Chronic | ICD-10-CM

## 2023-05-11 DIAGNOSIS — Z01.818 ENCOUNTER FOR OTHER PREPROCEDURAL EXAMINATION: ICD-10-CM

## 2023-05-11 DIAGNOSIS — N75.0 CYST OF BARTHOLIN'S GLAND: Chronic | ICD-10-CM

## 2023-05-11 LAB
ANION GAP SERPL CALC-SCNC: 10 MMOL/L — SIGNIFICANT CHANGE UP (ref 5–17)
BUN SERPL-MCNC: 16 MG/DL — SIGNIFICANT CHANGE UP (ref 7–23)
CALCIUM SERPL-MCNC: 9.3 MG/DL — SIGNIFICANT CHANGE UP (ref 8.4–10.5)
CHLORIDE SERPL-SCNC: 103 MMOL/L — SIGNIFICANT CHANGE UP (ref 96–108)
CO2 SERPL-SCNC: 26 MMOL/L — SIGNIFICANT CHANGE UP (ref 22–31)
CREAT SERPL-MCNC: 0.88 MG/DL — SIGNIFICANT CHANGE UP (ref 0.5–1.3)
EGFR: 86 ML/MIN/1.73M2 — SIGNIFICANT CHANGE UP
GLUCOSE SERPL-MCNC: 91 MG/DL — SIGNIFICANT CHANGE UP (ref 70–99)
HCT VFR BLD CALC: 35.4 % — SIGNIFICANT CHANGE UP (ref 34.5–45)
HGB BLD-MCNC: 11.5 G/DL — SIGNIFICANT CHANGE UP (ref 11.5–15.5)
MCHC RBC-ENTMCNC: 30.2 PG — SIGNIFICANT CHANGE UP (ref 27–34)
MCHC RBC-ENTMCNC: 32.5 GM/DL — SIGNIFICANT CHANGE UP (ref 32–36)
MCV RBC AUTO: 92.9 FL — SIGNIFICANT CHANGE UP (ref 80–100)
NRBC # BLD: 0 /100 WBCS — SIGNIFICANT CHANGE UP (ref 0–0)
PLATELET # BLD AUTO: 267 K/UL — SIGNIFICANT CHANGE UP (ref 150–400)
POTASSIUM SERPL-MCNC: 3.8 MMOL/L — SIGNIFICANT CHANGE UP (ref 3.5–5.3)
POTASSIUM SERPL-SCNC: 3.8 MMOL/L — SIGNIFICANT CHANGE UP (ref 3.5–5.3)
RBC # BLD: 3.81 M/UL — SIGNIFICANT CHANGE UP (ref 3.8–5.2)
RBC # FLD: 12.6 % — SIGNIFICANT CHANGE UP (ref 10.3–14.5)
SODIUM SERPL-SCNC: 139 MMOL/L — SIGNIFICANT CHANGE UP (ref 135–145)
WBC # BLD: 10.03 K/UL — SIGNIFICANT CHANGE UP (ref 3.8–10.5)
WBC # FLD AUTO: 10.03 K/UL — SIGNIFICANT CHANGE UP (ref 3.8–10.5)

## 2023-05-11 PROCEDURE — 36415 COLL VENOUS BLD VENIPUNCTURE: CPT

## 2023-05-11 PROCEDURE — 85027 COMPLETE CBC AUTOMATED: CPT

## 2023-05-11 PROCEDURE — G0463: CPT

## 2023-05-11 PROCEDURE — 80048 BASIC METABOLIC PNL TOTAL CA: CPT

## 2023-05-11 NOTE — H&P PST ADULT - MUSCULOSKELETAL
Right knee/no joint erythema/no joint warmth/no calf tenderness/decreased ROM/decreased ROM due to pain/decreased strength/abnormal gait details…

## 2023-05-11 NOTE — H&P PST ADULT - NSANTHOSAYNRD_GEN_A_CORE
No. MORTEZA screening performed.  STOP BANG Legend: 0-2 = LOW Risk; 3-4 = INTERMEDIATE Risk; 5-8 = HIGH Risk
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
## 2023-05-11 NOTE — H&P PST ADULT - PROBLEM SELECTOR PLAN 1
RIGHT Knee Arthroscopic Anterior Cruciate Ligament Reconstruction with Allograft on 05/31/2023.  NPO as per Anesthesia- no meds on AM of surgery  Hold Ibuprofen and Naproxen for 7 days pre-op  Instructions reviewed and questions addressed.

## 2023-05-11 NOTE — H&P PST ADULT - NSICDXFAMILYHX_GEN_ALL_CORE_FT
FAMILY HISTORY:  Sibling  Still living? Yes, Estimated age: 41-50  FH: type 2 diabetes mellitus, Age at diagnosis: Age Unknown

## 2023-05-30 ENCOUNTER — NON-APPOINTMENT (OUTPATIENT)
Age: 38
End: 2023-05-30

## 2023-05-30 ENCOUNTER — TRANSCRIPTION ENCOUNTER (OUTPATIENT)
Age: 38
End: 2023-05-30

## 2023-05-30 NOTE — H&P PST ADULT - NS PRO FEM REPRO PAP RESULTS
Graft Donor Site Bandage (Optional-Leave Blank If You Don't Want In Note): Steri-strips and a pressure bandage were applied to the donor site. normal

## 2023-05-31 ENCOUNTER — TRANSCRIPTION ENCOUNTER (OUTPATIENT)
Age: 38
End: 2023-05-31

## 2023-05-31 ENCOUNTER — OUTPATIENT (OUTPATIENT)
Dept: OUTPATIENT SERVICES | Facility: HOSPITAL | Age: 38
LOS: 1 days | End: 2023-05-31
Payer: MEDICAID

## 2023-05-31 ENCOUNTER — APPOINTMENT (OUTPATIENT)
Dept: ORTHOPEDIC SURGERY | Facility: HOSPITAL | Age: 38
End: 2023-05-31

## 2023-05-31 VITALS
RESPIRATION RATE: 11 BRPM | DIASTOLIC BLOOD PRESSURE: 64 MMHG | HEART RATE: 86 BPM | HEIGHT: 62 IN | OXYGEN SATURATION: 98 % | WEIGHT: 176.59 LBS | TEMPERATURE: 97 F | SYSTOLIC BLOOD PRESSURE: 104 MMHG

## 2023-05-31 VITALS
OXYGEN SATURATION: 96 % | DIASTOLIC BLOOD PRESSURE: 58 MMHG | HEART RATE: 97 BPM | TEMPERATURE: 98 F | RESPIRATION RATE: 18 BRPM | SYSTOLIC BLOOD PRESSURE: 108 MMHG

## 2023-05-31 DIAGNOSIS — N75.0 CYST OF BARTHOLIN'S GLAND: Chronic | ICD-10-CM

## 2023-05-31 DIAGNOSIS — S83.511A SPRAIN OF ANTERIOR CRUCIATE LIGAMENT OF RIGHT KNEE, INITIAL ENCOUNTER: ICD-10-CM

## 2023-05-31 LAB — HCG UR QL: NEGATIVE — SIGNIFICANT CHANGE UP

## 2023-05-31 PROCEDURE — 81025 URINE PREGNANCY TEST: CPT

## 2023-05-31 PROCEDURE — 97161 PT EVAL LOW COMPLEX 20 MIN: CPT

## 2023-05-31 PROCEDURE — 29888 ARTHRS AID ACL RPR/AGMNTJ: CPT | Mod: RT

## 2023-05-31 PROCEDURE — C1889: CPT

## 2023-05-31 PROCEDURE — C1713: CPT

## 2023-05-31 DEVICE — PIN GUIDE FLEX MUST ORDER IN MULT OF 5: Type: IMPLANTABLE DEVICE | Site: RIGHT | Status: FUNCTIONAL

## 2023-05-31 DEVICE — IMP TIGHTROPE BTB: Type: IMPLANTABLE DEVICE | Site: RIGHT | Status: FUNCTIONAL

## 2023-05-31 DEVICE — IMP TIGHTROPE BTB IB W/ FLIPCUTTER III DRILL: Type: IMPLANTABLE DEVICE | Site: RIGHT | Status: FUNCTIONAL

## 2023-05-31 RX ORDER — OXYCODONE AND ACETAMINOPHEN 5; 325 MG/1; MG/1
1 TABLET ORAL
Qty: 25 | Refills: 0
Start: 2023-05-31

## 2023-05-31 RX ORDER — HYDROMORPHONE HYDROCHLORIDE 2 MG/ML
0.5 INJECTION INTRAMUSCULAR; INTRAVENOUS; SUBCUTANEOUS
Refills: 0 | Status: DISCONTINUED | OUTPATIENT
Start: 2023-05-31 | End: 2023-06-01

## 2023-05-31 RX ORDER — SODIUM CHLORIDE 9 MG/ML
1000 INJECTION, SOLUTION INTRAVENOUS
Refills: 0 | Status: DISCONTINUED | OUTPATIENT
Start: 2023-05-31 | End: 2023-06-01

## 2023-05-31 RX ORDER — CHLORHEXIDINE GLUCONATE 213 G/1000ML
1 SOLUTION TOPICAL ONCE
Refills: 0 | Status: COMPLETED | OUTPATIENT
Start: 2023-05-31 | End: 2023-05-31

## 2023-05-31 RX ORDER — CEFAZOLIN SODIUM 1 G
2000 VIAL (EA) INJECTION ONCE
Refills: 0 | Status: COMPLETED | OUTPATIENT
Start: 2023-05-31 | End: 2023-05-31

## 2023-05-31 RX ORDER — ONDANSETRON 8 MG/1
4 TABLET, FILM COATED ORAL ONCE
Refills: 0 | Status: COMPLETED | OUTPATIENT
Start: 2023-05-31 | End: 2023-05-31

## 2023-05-31 RX ORDER — OXYCODONE HYDROCHLORIDE 5 MG/1
5 TABLET ORAL ONCE
Refills: 0 | Status: DISCONTINUED | OUTPATIENT
Start: 2023-05-31 | End: 2023-06-01

## 2023-05-31 RX ORDER — HYDROMORPHONE HYDROCHLORIDE 2 MG/ML
1 INJECTION INTRAMUSCULAR; INTRAVENOUS; SUBCUTANEOUS
Refills: 0 | Status: DISCONTINUED | OUTPATIENT
Start: 2023-05-31 | End: 2023-06-01

## 2023-05-31 RX ORDER — ACETAMINOPHEN 500 MG
1000 TABLET ORAL ONCE
Refills: 0 | Status: COMPLETED | OUTPATIENT
Start: 2023-05-31 | End: 2023-05-31

## 2023-05-31 RX ADMIN — HYDROMORPHONE HYDROCHLORIDE 0.5 MILLIGRAM(S): 2 INJECTION INTRAMUSCULAR; INTRAVENOUS; SUBCUTANEOUS at 19:40

## 2023-05-31 RX ADMIN — ONDANSETRON 4 MILLIGRAM(S): 8 TABLET, FILM COATED ORAL at 19:46

## 2023-05-31 RX ADMIN — HYDROMORPHONE HYDROCHLORIDE 0.5 MILLIGRAM(S): 2 INJECTION INTRAMUSCULAR; INTRAVENOUS; SUBCUTANEOUS at 19:10

## 2023-05-31 RX ADMIN — HYDROMORPHONE HYDROCHLORIDE 0.5 MILLIGRAM(S): 2 INJECTION INTRAMUSCULAR; INTRAVENOUS; SUBCUTANEOUS at 18:50

## 2023-05-31 RX ADMIN — CHLORHEXIDINE GLUCONATE 1 APPLICATION(S): 213 SOLUTION TOPICAL at 14:51

## 2023-05-31 NOTE — BRIEF OPERATIVE NOTE - NSICDXBRIEFPOSTOP_GEN_ALL_CORE_FT
POST-OP DIAGNOSIS:  Rupture of anterior cruciate ligament of left knee 31-May-2023 18:46:06  Sandra Hines

## 2023-05-31 NOTE — ASU DISCHARGE PLAN (ADULT/PEDIATRIC) - CARE PROVIDER_API CALL
Garrett Johnson  Orthopaedic Surgery  [address], [address]  [place] [address]  Phone: [phone]  Fax: [phone]  Follow Up Time: 2 weeks

## 2023-05-31 NOTE — PHYSICAL THERAPY INITIAL EVALUATION ADULT - ASR WT BEARING STATUS EVAL
Ventricular Rate : 84  Atrial Rate : 69  QRS Duration : 96  Q-T Interval : 426  QTC Calculation(Bezet) : 503  R Axis : -49  T Axis : -45  Diagnosis : Accelerated Junctional rhythm  Left axis deviation  Low voltage QRS  Inferior infarct (cited on or before 04-JUN-2019)  Possible Anterolateral infarct (cited on or before 04-JUN-2019)  Prolonged QT  Abnormal ECG  When compared with ECG of 06-JUN-2019 12:01,  Junctional rhythm has replaced Ectopic atrial rhythm  Vent. rate has increased BY  34 BPM  Serial changes of evolving Anterolateral infarct Present  Serial changes of evolving Inferior infarct Present  Confirmed by DAI MONTANO ([id number]) on 6/27/2019 9:12:42 AM   Right LE

## 2023-05-31 NOTE — BRIEF OPERATIVE NOTE - NSICDXBRIEFPREOP_GEN_ALL_CORE_FT
PRE-OP DIAGNOSIS:  Rupture of anterior cruciate ligament of left knee 31-May-2023 18:46:09  Sandra Hines

## 2023-06-07 PROBLEM — E66.9 OBESITY, UNSPECIFIED: Chronic | Status: ACTIVE | Noted: 2023-05-11

## 2023-06-08 ENCOUNTER — APPOINTMENT (OUTPATIENT)
Dept: ORTHOPEDIC SURGERY | Facility: CLINIC | Age: 38
End: 2023-06-08
Payer: MEDICAID

## 2023-06-08 PROCEDURE — 99024 POSTOP FOLLOW-UP VISIT: CPT

## 2023-06-21 ENCOUNTER — NON-APPOINTMENT (OUTPATIENT)
Age: 38
End: 2023-06-21

## 2023-06-21 NOTE — ED ADULT TRIAGE NOTE - BSA (M2)
1.81 Topical Sulfur Applications Pregnancy And Lactation Text: This medication is considered safe during pregnancy and breast feeding secondary to limited systemic absorption.

## 2023-06-29 ENCOUNTER — RX RENEWAL (OUTPATIENT)
Age: 38
End: 2023-06-29

## 2023-07-06 ENCOUNTER — NON-APPOINTMENT (OUTPATIENT)
Age: 38
End: 2023-07-06

## 2023-07-17 ENCOUNTER — APPOINTMENT (OUTPATIENT)
Dept: ORTHOPEDIC SURGERY | Facility: CLINIC | Age: 38
End: 2023-07-17
Payer: MEDICAID

## 2023-07-17 VITALS — BODY MASS INDEX: 31.28 KG/M2 | WEIGHT: 170 LBS | HEIGHT: 62 IN

## 2023-07-17 PROCEDURE — 99024 POSTOP FOLLOW-UP VISIT: CPT

## 2023-07-18 ENCOUNTER — RX RENEWAL (OUTPATIENT)
Age: 38
End: 2023-07-18

## 2023-09-05 ENCOUNTER — APPOINTMENT (OUTPATIENT)
Dept: ORTHOPEDIC SURGERY | Facility: CLINIC | Age: 38
End: 2023-09-05

## 2023-09-14 ENCOUNTER — APPOINTMENT (OUTPATIENT)
Dept: ORTHOPEDIC SURGERY | Facility: CLINIC | Age: 38
End: 2023-09-14
Payer: MEDICAID

## 2023-09-14 VITALS — BODY MASS INDEX: 32.76 KG/M2 | WEIGHT: 178 LBS | HEIGHT: 62 IN

## 2023-09-14 PROCEDURE — 99213 OFFICE O/P EST LOW 20 MIN: CPT

## 2023-09-22 ENCOUNTER — APPOINTMENT (OUTPATIENT)
Dept: FAMILY MEDICINE | Facility: HOSPITAL | Age: 38
End: 2023-09-22

## 2023-09-22 ENCOUNTER — OUTPATIENT (OUTPATIENT)
Dept: OUTPATIENT SERVICES | Facility: HOSPITAL | Age: 38
LOS: 1 days | End: 2023-09-22
Payer: MEDICAID

## 2023-09-22 VITALS
RESPIRATION RATE: 18 BRPM | TEMPERATURE: 97.8 F | HEART RATE: 92 BPM | SYSTOLIC BLOOD PRESSURE: 127 MMHG | OXYGEN SATURATION: 97 % | DIASTOLIC BLOOD PRESSURE: 79 MMHG

## 2023-09-22 DIAGNOSIS — Z00.00 ENCOUNTER FOR GENERAL ADULT MEDICAL EXAMINATION WITHOUT ABNORMAL FINDINGS: ICD-10-CM

## 2023-09-22 DIAGNOSIS — J01.80 OTHER ACUTE SINUSITIS: ICD-10-CM

## 2023-09-22 DIAGNOSIS — N75.0 CYST OF BARTHOLIN'S GLAND: Chronic | ICD-10-CM

## 2023-09-22 DIAGNOSIS — J06.9 ACUTE UPPER RESPIRATORY INFECTION, UNSPECIFIED: ICD-10-CM

## 2023-09-22 PROCEDURE — 84443 ASSAY THYROID STIM HORMONE: CPT

## 2023-09-22 PROCEDURE — 85025 COMPLETE CBC W/AUTO DIFF WBC: CPT

## 2023-09-22 PROCEDURE — G0463: CPT

## 2023-09-22 PROCEDURE — 80053 COMPREHEN METABOLIC PANEL: CPT

## 2023-09-22 RX ORDER — ALBUTEROL SULFATE 90 UG/1
108 (90 BASE) INHALANT RESPIRATORY (INHALATION)
Qty: 8.5 | Refills: 0 | Status: ACTIVE | COMMUNITY
Start: 2022-10-02 | End: 1900-01-01

## 2023-09-24 PROBLEM — J01.80 OTHER ACUTE SINUSITIS, RECURRENCE NOT SPECIFIED: Status: ACTIVE | Noted: 2023-09-22

## 2023-09-25 LAB
ALBUMIN SERPL ELPH-MCNC: 4.6 G/DL
ALP BLD-CCNC: 81 U/L
ALT SERPL-CCNC: 23 U/L
ANION GAP SERPL CALC-SCNC: 12 MMOL/L
AST SERPL-CCNC: 26 U/L
BASOPHILS # BLD AUTO: 0.05 K/UL
BASOPHILS NFR BLD AUTO: 0.5 %
BILIRUB SERPL-MCNC: 0.2 MG/DL
BUN SERPL-MCNC: 12 MG/DL
CALCIUM SERPL-MCNC: 9.6 MG/DL
CHLORIDE SERPL-SCNC: 106 MMOL/L
CO2 SERPL-SCNC: 24 MMOL/L
CREAT SERPL-MCNC: 0.79 MG/DL
EGFR: 98 ML/MIN/1.73M2
EOSINOPHIL # BLD AUTO: 0.52 K/UL
EOSINOPHIL NFR BLD AUTO: 5.4 %
GLUCOSE SERPL-MCNC: 89 MG/DL
HCT VFR BLD CALC: 38.9 %
HGB BLD-MCNC: 12.1 G/DL
IMM GRANULOCYTES NFR BLD AUTO: 0.3 %
LYMPHOCYTES # BLD AUTO: 3.42 K/UL
LYMPHOCYTES NFR BLD AUTO: 35.8 %
MAN DIFF?: NORMAL
MCHC RBC-ENTMCNC: 30.3 PG
MCHC RBC-ENTMCNC: 31.1 GM/DL
MCV RBC AUTO: 97.3 FL
MONOCYTES # BLD AUTO: 0.93 K/UL
MONOCYTES NFR BLD AUTO: 9.7 %
NEUTROPHILS # BLD AUTO: 4.61 K/UL
NEUTROPHILS NFR BLD AUTO: 48.3 %
PLATELET # BLD AUTO: 254 K/UL
POTASSIUM SERPL-SCNC: 4 MMOL/L
PROT SERPL-MCNC: 7.4 G/DL
RBC # BLD: 4 M/UL
RBC # FLD: 13.2 %
SODIUM SERPL-SCNC: 142 MMOL/L
TSH SERPL-ACNC: 0.81 UIU/ML
WBC # FLD AUTO: 9.56 K/UL

## 2023-10-04 ENCOUNTER — EMERGENCY (EMERGENCY)
Facility: HOSPITAL | Age: 38
LOS: 1 days | Discharge: HOME | End: 2023-10-04
Attending: INTERNAL MEDICINE | Admitting: INTERNAL MEDICINE
Payer: MEDICAID

## 2023-10-04 VITALS
HEART RATE: 96 BPM | TEMPERATURE: 98 F | HEIGHT: 62 IN | WEIGHT: 184.97 LBS | SYSTOLIC BLOOD PRESSURE: 109 MMHG | DIASTOLIC BLOOD PRESSURE: 69 MMHG | OXYGEN SATURATION: 98 % | RESPIRATION RATE: 22 BRPM

## 2023-10-04 DIAGNOSIS — N75.0 CYST OF BARTHOLIN'S GLAND: Chronic | ICD-10-CM

## 2023-10-04 LAB
ALBUMIN SERPL ELPH-MCNC: 3.8 G/DL — SIGNIFICANT CHANGE UP (ref 3.3–5)
ALP SERPL-CCNC: 74 U/L — SIGNIFICANT CHANGE UP (ref 40–120)
ALT FLD-CCNC: 29 U/L — SIGNIFICANT CHANGE UP (ref 10–45)
ANION GAP SERPL CALC-SCNC: 15 MMOL/L — SIGNIFICANT CHANGE UP (ref 5–17)
AST SERPL-CCNC: 21 U/L — SIGNIFICANT CHANGE UP (ref 10–40)
BASOPHILS # BLD AUTO: 0.05 K/UL — SIGNIFICANT CHANGE UP (ref 0–0.2)
BASOPHILS NFR BLD AUTO: 0.4 % — SIGNIFICANT CHANGE UP (ref 0–2)
BILIRUB SERPL-MCNC: 0.2 MG/DL — SIGNIFICANT CHANGE UP (ref 0.2–1.2)
BUN SERPL-MCNC: 12 MG/DL — SIGNIFICANT CHANGE UP (ref 7–23)
CALCIUM SERPL-MCNC: 9.5 MG/DL — SIGNIFICANT CHANGE UP (ref 8.4–10.5)
CHLORIDE SERPL-SCNC: 102 MMOL/L — SIGNIFICANT CHANGE UP (ref 96–108)
CO2 SERPL-SCNC: 18 MMOL/L — LOW (ref 22–31)
CREAT SERPL-MCNC: 1.18 MG/DL — SIGNIFICANT CHANGE UP (ref 0.5–1.3)
D DIMER BLD IA.RAPID-MCNC: <150 NG/ML DDU — SIGNIFICANT CHANGE UP
EGFR: 61 ML/MIN/1.73M2 — SIGNIFICANT CHANGE UP
EOSINOPHIL # BLD AUTO: 0.01 K/UL — SIGNIFICANT CHANGE UP (ref 0–0.5)
EOSINOPHIL NFR BLD AUTO: 0.1 % — SIGNIFICANT CHANGE UP (ref 0–6)
GLUCOSE SERPL-MCNC: 165 MG/DL — HIGH (ref 70–99)
HCT VFR BLD CALC: 37.8 % — SIGNIFICANT CHANGE UP (ref 34.5–45)
HGB BLD-MCNC: 12.4 G/DL — SIGNIFICANT CHANGE UP (ref 11.5–15.5)
IMM GRANULOCYTES NFR BLD AUTO: 0.5 % — SIGNIFICANT CHANGE UP (ref 0–0.9)
LYMPHOCYTES # BLD AUTO: 1.31 K/UL — SIGNIFICANT CHANGE UP (ref 1–3.3)
LYMPHOCYTES # BLD AUTO: 10.8 % — LOW (ref 13–44)
MCHC RBC-ENTMCNC: 30 PG — SIGNIFICANT CHANGE UP (ref 27–34)
MCHC RBC-ENTMCNC: 32.8 GM/DL — SIGNIFICANT CHANGE UP (ref 32–36)
MCV RBC AUTO: 91.3 FL — SIGNIFICANT CHANGE UP (ref 80–100)
MONOCYTES # BLD AUTO: 0.18 K/UL — SIGNIFICANT CHANGE UP (ref 0–0.9)
MONOCYTES NFR BLD AUTO: 1.5 % — LOW (ref 2–14)
NEUTROPHILS # BLD AUTO: 10.48 K/UL — HIGH (ref 1.8–7.4)
NEUTROPHILS NFR BLD AUTO: 86.7 % — HIGH (ref 43–77)
NRBC # BLD: 0 /100 WBCS — SIGNIFICANT CHANGE UP (ref 0–0)
PLATELET # BLD AUTO: 283 K/UL — SIGNIFICANT CHANGE UP (ref 150–400)
POTASSIUM SERPL-MCNC: 3.5 MMOL/L — SIGNIFICANT CHANGE UP (ref 3.5–5.3)
POTASSIUM SERPL-SCNC: 3.5 MMOL/L — SIGNIFICANT CHANGE UP (ref 3.5–5.3)
PROT SERPL-MCNC: 8.2 G/DL — SIGNIFICANT CHANGE UP (ref 6–8.3)
RAPID RVP RESULT: SIGNIFICANT CHANGE UP
RBC # BLD: 4.14 M/UL — SIGNIFICANT CHANGE UP (ref 3.8–5.2)
RBC # FLD: 12.9 % — SIGNIFICANT CHANGE UP (ref 10.3–14.5)
SARS-COV-2 RNA SPEC QL NAA+PROBE: SIGNIFICANT CHANGE UP
SODIUM SERPL-SCNC: 135 MMOL/L — SIGNIFICANT CHANGE UP (ref 135–145)
TROPONIN I, HIGH SENSITIVITY RESULT: 4.4 NG/L — SIGNIFICANT CHANGE UP
WBC # BLD: 12.09 K/UL — HIGH (ref 3.8–10.5)
WBC # FLD AUTO: 12.09 K/UL — HIGH (ref 3.8–10.5)

## 2023-10-04 PROCEDURE — 94640 AIRWAY INHALATION TREATMENT: CPT

## 2023-10-04 PROCEDURE — 99284 EMERGENCY DEPT VISIT MOD MDM: CPT | Mod: 25

## 2023-10-04 PROCEDURE — 85025 COMPLETE CBC W/AUTO DIFF WBC: CPT

## 2023-10-04 PROCEDURE — 96375 TX/PRO/DX INJ NEW DRUG ADDON: CPT

## 2023-10-04 PROCEDURE — 99285 EMERGENCY DEPT VISIT HI MDM: CPT

## 2023-10-04 PROCEDURE — 71045 X-RAY EXAM CHEST 1 VIEW: CPT

## 2023-10-04 PROCEDURE — 71045 X-RAY EXAM CHEST 1 VIEW: CPT | Mod: 26

## 2023-10-04 PROCEDURE — 85379 FIBRIN DEGRADATION QUANT: CPT

## 2023-10-04 PROCEDURE — 0225U NFCT DS DNA&RNA 21 SARSCOV2: CPT

## 2023-10-04 PROCEDURE — 96376 TX/PRO/DX INJ SAME DRUG ADON: CPT

## 2023-10-04 PROCEDURE — 96374 THER/PROPH/DIAG INJ IV PUSH: CPT

## 2023-10-04 PROCEDURE — 80053 COMPREHEN METABOLIC PANEL: CPT

## 2023-10-04 PROCEDURE — 84484 ASSAY OF TROPONIN QUANT: CPT

## 2023-10-04 PROCEDURE — 36415 COLL VENOUS BLD VENIPUNCTURE: CPT

## 2023-10-04 RX ORDER — ONDANSETRON 8 MG/1
4 TABLET, FILM COATED ORAL ONCE
Refills: 0 | Status: COMPLETED | OUTPATIENT
Start: 2023-10-04 | End: 2023-10-04

## 2023-10-04 RX ORDER — ONDANSETRON 8 MG/1
1 TABLET, FILM COATED ORAL
Qty: 30 | Refills: 0
Start: 2023-10-04 | End: 2023-10-13

## 2023-10-04 RX ORDER — SODIUM CHLORIDE 9 MG/ML
1000 INJECTION INTRAMUSCULAR; INTRAVENOUS; SUBCUTANEOUS ONCE
Refills: 0 | Status: COMPLETED | OUTPATIENT
Start: 2023-10-04 | End: 2023-10-04

## 2023-10-04 RX ORDER — ALBUTEROL 90 UG/1
2 AEROSOL, METERED ORAL
Qty: 1 | Refills: 0
Start: 2023-10-04 | End: 2023-11-02

## 2023-10-04 RX ORDER — IPRATROPIUM/ALBUTEROL SULFATE 18-103MCG
3 AEROSOL WITH ADAPTER (GRAM) INHALATION
Refills: 0 | Status: COMPLETED | OUTPATIENT
Start: 2023-10-04 | End: 2023-10-04

## 2023-10-04 RX ORDER — AZITHROMYCIN 500 MG/1
500 TABLET, FILM COATED ORAL ONCE
Refills: 0 | Status: COMPLETED | OUTPATIENT
Start: 2023-10-04 | End: 2023-10-04

## 2023-10-04 RX ORDER — FLUTICASONE PROPIONATE 220 MCG
440 AEROSOL WITH ADAPTER (GRAM) INHALATION
Qty: 1 | Refills: 0
Start: 2023-10-04 | End: 2023-11-02

## 2023-10-04 RX ADMIN — ONDANSETRON 4 MILLIGRAM(S): 8 TABLET, FILM COATED ORAL at 19:43

## 2023-10-04 RX ADMIN — SODIUM CHLORIDE 1000 MILLILITER(S): 9 INJECTION INTRAMUSCULAR; INTRAVENOUS; SUBCUTANEOUS at 19:43

## 2023-10-04 RX ADMIN — Medication 3 MILLILITER(S): at 14:27

## 2023-10-04 RX ADMIN — Medication 3 MILLILITER(S): at 14:53

## 2023-10-04 RX ADMIN — AZITHROMYCIN 500 MILLIGRAM(S): 500 TABLET, FILM COATED ORAL at 14:41

## 2023-10-04 RX ADMIN — Medication 1 MILLIGRAM(S): at 19:44

## 2023-10-04 RX ADMIN — ONDANSETRON 4 MILLIGRAM(S): 8 TABLET, FILM COATED ORAL at 14:52

## 2023-10-04 RX ADMIN — ONDANSETRON 4 MILLIGRAM(S): 8 TABLET, FILM COATED ORAL at 18:29

## 2023-10-04 RX ADMIN — Medication 100 MILLIGRAM(S): at 19:44

## 2023-10-04 NOTE — ED ADULT NURSE NOTE - OBJECTIVE STATEMENT
Patient brought in by EMS from home with complaint of asthma exacerbation. Patient was given dexamethasone IV and 1 Duoneb. Patient has a hx of asthma. Patient felt nauseous. Denies any fever, chills, headache, or chest pain. PMH of asthma.

## 2023-10-04 NOTE — ED PROVIDER NOTE - PHYSICAL EXAMINATION
General:     NAD, well-nourished, well-appearing  Head:     NC/AT, EOMI, oral mucosa moist  Neck:     trachea midline  Lungs:     Scattered wheezing bilaterally  CVS:     S1S2, RRR, no m/g/r  Abd:     +BS, s/nt/nd, no organomegaly  Ext:    2+ radial and pedal pulses, no c/c/e  Neuro: AAOx3, no sensory/motor deficits

## 2023-10-04 NOTE — ED ADULT NURSE NOTE - NSFALLUNIVINTERV_ED_ALL_ED
Bed/Stretcher in lowest position, wheels locked, appropriate side rails in place/Call bell, personal items and telephone in reach/Instruct patient to call for assistance before getting out of bed/chair/stretcher/Non-slip footwear applied when patient is off stretcher/[place] to call system/Physically safe environment - no spills, clutter or unnecessary equipment/Purposeful proactive rounding/Room/bathroom lighting operational, light cord in reach

## 2023-10-04 NOTE — ED PROVIDER NOTE - PATIENT PORTAL LINK FT
You can access the FollowMyHealth Patient Portal offered by [facility] by registering at the following website: [email or web address]. By joining [employer]’s FollowMyHealth portal, you will also be able to view your health information using other applications (apps) compatible with our system. You can access the FollowMyHealth Patient Portal offered by [facility] by registering at the following website: [email or web address]. By joining [employer]’s FollowMyHealth portal, you will also be able to view your health information using other applications (apps) compatible with our system.

## 2023-10-04 NOTE — ED PROVIDER NOTE - CLINICAL SUMMARY MEDICAL DECISION MAKING FREE TEXT BOX
37-year-old female history of asthma came to the emergency chief complaint of wheezing shortness of breath cough also complained of nausea and vomiting denies any fever no nasal congestion no abdominal pain no chest pain  Chest x-ray clear patient treated with DuoNebs and Decadron and albuterol by EMS also given Zofran and IV fluids patient feels much 37-year-old female history of asthma came to the emergency chief complaint of wheezing shortness of breath cough also complained of nausea and vomiting denies any fever no nasal congestion no abdominal pain no chest pain  Chest x-ray clear patient treated with DuoNebs and Decadron and albuterol by EMS also given Zofran and IV fluids patient feels much  6:50 PM after the patient was discharged felt dizzy and shortness of breath plan to reinsert the IV plan to do the CT angio of the chest 37-year-old female history of asthma came to the emergency chief complaint of wheezing shortness of breath cough also complained of nausea and vomiting denies any fever no nasal congestion no abdominal pain no chest pain  Chest x-ray clear patient treated with DuoNebs and Decadron and albuterol by EMS also given Zofran and IV fluids patient feels much  6:50 PM after the patient was discharged felt dizzy and shortness of breath plan to reinsert the IV plan to do the d dimer/CT angio of the chest Case signed out to Dr. Hu for further evaluation and follow-up with the labs 37-year-old female history of asthma came to the emergency chief complaint of wheezing shortness of breath cough also complained of nausea and vomiting denies any fever no nasal congestion no abdominal pain no chest pain  Chest x-ray clear patient treated with DuoNebs and Decadron and albuterol by EMS also given Zofran and IV fluids patient feels much  6:50 PM after the patient was discharged felt dizzy and shortness of breath plan to reinsert the IV plan to do the d dimer/CT angio of the chest Case signed out to Dr. Hu for further evaluation and follow-up with the labs    2030: Labs unremarkable.  Troponin D-dimer negative.  EKG normal.  Patient feeling better. clear lungs. no labored breathing.  Ambulatory. will discharge.  PMD follow-up. Dr hu

## 2023-10-04 NOTE — ED PROVIDER NOTE - NSFOLLOWUPINSTRUCTIONS_ED_ALL_ED_FT
Follow up with your PMD within 1-2 days.  Rest, increase your fluids, advance your activity as tolerated.   Take all of your other medications as previously prescribed.   Worsening, continued or ANY new concerning symptoms return to the emergency department.  Zofran for nausea vomiting albuterol 2 puffs every 6 hours as needed for cough Zofran  4mg every 8 hours for nausea and vomiting Flovent 2 puffs twice daily rinse mouth after use prednisone 50 mg once daily for 3 days

## 2023-10-04 NOTE — ED PROVIDER NOTE - OBJECTIVE STATEMENT
37-year-old female history of asthma came to the emergency chief complaint of wheezing shortness of breath cough also complained of nausea and vomiting denies any fever no nasal congestion no abdominal pain no chest pain

## 2023-10-09 ENCOUNTER — APPOINTMENT (OUTPATIENT)
Dept: FAMILY MEDICINE | Facility: HOSPITAL | Age: 38
End: 2023-10-09

## 2023-12-11 ENCOUNTER — APPOINTMENT (OUTPATIENT)
Dept: ORTHOPEDIC SURGERY | Facility: CLINIC | Age: 38
End: 2023-12-11
Payer: MEDICAID

## 2023-12-11 VITALS — HEIGHT: 62 IN | BODY MASS INDEX: 34.04 KG/M2 | WEIGHT: 185 LBS

## 2023-12-11 DIAGNOSIS — G89.29 PAIN IN RIGHT KNEE: ICD-10-CM

## 2023-12-11 DIAGNOSIS — M25.561 PAIN IN RIGHT KNEE: ICD-10-CM

## 2023-12-11 DIAGNOSIS — S83.511D SPRAIN OF ANTERIOR CRUCIATE LIGAMENT OF RIGHT KNEE, SUBSEQUENT ENCOUNTER: ICD-10-CM

## 2023-12-11 DIAGNOSIS — M76.52 PATELLAR TENDINITIS, LEFT KNEE: ICD-10-CM

## 2023-12-11 DIAGNOSIS — M25.562 PAIN IN RIGHT KNEE: ICD-10-CM

## 2023-12-11 PROCEDURE — 73564 X-RAY EXAM KNEE 4 OR MORE: CPT | Mod: LT,RT

## 2023-12-11 PROCEDURE — 99214 OFFICE O/P EST MOD 30 MIN: CPT | Mod: 25

## 2023-12-16 ENCOUNTER — EMERGENCY (EMERGENCY)
Facility: HOSPITAL | Age: 38
LOS: 1 days | Discharge: HOME | End: 2023-12-16
Attending: EMERGENCY MEDICINE | Admitting: EMERGENCY MEDICINE
Payer: MEDICAID

## 2023-12-16 VITALS
DIASTOLIC BLOOD PRESSURE: 71 MMHG | WEIGHT: 179.9 LBS | RESPIRATION RATE: 18 BRPM | TEMPERATURE: 98 F | OXYGEN SATURATION: 98 % | SYSTOLIC BLOOD PRESSURE: 138 MMHG | HEIGHT: 63 IN | HEART RATE: 110 BPM

## 2023-12-16 VITALS — HEART RATE: 92 BPM | OXYGEN SATURATION: 99 % | RESPIRATION RATE: 18 BRPM

## 2023-12-16 DIAGNOSIS — N75.0 CYST OF BARTHOLIN'S GLAND: Chronic | ICD-10-CM

## 2023-12-16 PROCEDURE — 73562 X-RAY EXAM OF KNEE 3: CPT

## 2023-12-16 PROCEDURE — 73562 X-RAY EXAM OF KNEE 3: CPT | Mod: 26,RT

## 2023-12-16 PROCEDURE — 99285 EMERGENCY DEPT VISIT HI MDM: CPT

## 2023-12-16 PROCEDURE — 99284 EMERGENCY DEPT VISIT MOD MDM: CPT | Mod: 25

## 2023-12-16 RX ORDER — OXYCODONE AND ACETAMINOPHEN 5; 325 MG/1; MG/1
1 TABLET ORAL ONCE
Refills: 0 | Status: DISCONTINUED | OUTPATIENT
Start: 2023-12-16 | End: 2023-12-16

## 2023-12-16 RX ORDER — IBUPROFEN 200 MG
600 TABLET ORAL ONCE
Refills: 0 | Status: COMPLETED | OUTPATIENT
Start: 2023-12-16 | End: 2023-12-16

## 2023-12-16 RX ADMIN — Medication 600 MILLIGRAM(S): at 05:07

## 2023-12-16 NOTE — ED PROVIDER NOTE - PATIENT PORTAL LINK FT
You can access the FollowMyHealth Patient Portal offered by [facility] by registering at the following website: [email or web address]. By joining [employer]’s FollowMyHealth portal, you will also be able to view your health information using other applications (apps) compatible with our system. You can access the FollowMyHealth Patient Portal offered by [facility] by registering at the following website: [email or web address]. By joining [employer]’s FollowMyHealth portal, you will also be able to view your health information using other applications (apps) compatible with our system.

## 2023-12-16 NOTE — ED ADULT TRIAGE NOTE - HEIGHT IN CM
Price (Do Not Change): 0.00
Instructions: This plan will send the code FBSD to the PM system.  DO NOT or CHANGE the price.
Detail Level: Simple
160.02

## 2023-12-16 NOTE — ED PROVIDER NOTE - NSFOLLOWUPINSTRUCTIONS_ED_ALL_ED_FT
Strain knee     A strain is a stretch or tear in one of the muscles in your body. This is caused by an injury to the area such as a twisting mechanism. Symptoms include pain, swelling, or bruising. Rest that area over the next several days and slowly resume activity when tolerated. Ice can help with swelling and pain.   apply cold compress ,   take Ibuprofen for pain     SEEK IMMEDIATE MEDICAL CARE IF YOU HAVE ANY OF THE FOLLOWING SYMPTOMS: worsening pain, inability to move that body part, numbness or tingling.

## 2023-12-16 NOTE — ED ADULT NURSE NOTE - NSFALLUNIVINTERV_ED_ALL_ED
Bed/Stretcher in lowest position, wheels locked, appropriate side rails in place/Call bell, personal items and telephone in reach/Instruct patient to call for assistance before getting out of bed/chair/stretcher/Non-slip footwear applied when patient is off stretcher/[place] to call system/Physically safe environment - no spills, clutter or unnecessary equipment/Purposeful proactive rounding/Room/bathroom lighting operational, light cord in reach Bed/Stretcher in lowest position, wheels locked, appropriate side rails in place/Call bell, personal items and telephone in reach/Instruct patient to call for assistance before getting out of bed/chair/stretcher/Non-slip footwear applied when patient is off stretcher/[place] to call system/Physically safe environment - no spills, clutter or unnecessary equipment/Purposeful proactive rounding/Room/bathroom lighting operational, light cord in reach

## 2023-12-16 NOTE — ED ADULT NURSE NOTE - NSICDXPASTSURGICALHX_GEN_ALL_CORE_FT
PAST SURGICAL HISTORY:  Bartholin cyst in november, had drainage at [facility]     PAST SURGICAL HISTORY:  Bartholin cyst in november, had drainage at [facility]
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
## 2023-12-16 NOTE — ED PROVIDER NOTE - PHYSICAL EXAMINATION
General:     NAD, well-nourished, well-appearing  Head:     NC/AT, EOMI, oral mucosa moist  Neck:     supple  Lungs:     CTA b/l, no w/r/r  CVS:     S1S2, RRR, no m/g/r  Abd:     +BS, s/nt/nd, no organomegaly  Ext:    2+ radial and pedal pulses, no c/c/e, right knee mod edema, decreased ROM, dist NV intact   Neuro: grossly intact

## 2023-12-16 NOTE — ED PROVIDER NOTE - NSICDXPASTSURGICALHX_GEN_ALL_CORE_FT
PAST SURGICAL HISTORY:  Bartholin cyst in november, had drainage at [facility]     PAST SURGICAL HISTORY:  Bartholin cyst in november, had drainage at [facility]
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
## 2024-01-04 ENCOUNTER — EMERGENCY (EMERGENCY)
Facility: HOSPITAL | Age: 39
LOS: 1 days | Discharge: HOME | End: 2024-01-04
Attending: EMERGENCY MEDICINE | Admitting: EMERGENCY MEDICINE
Payer: MEDICAID

## 2024-01-04 VITALS
HEART RATE: 78 BPM | WEIGHT: 179.9 LBS | SYSTOLIC BLOOD PRESSURE: 127 MMHG | DIASTOLIC BLOOD PRESSURE: 87 MMHG | HEIGHT: 63 IN | OXYGEN SATURATION: 100 % | RESPIRATION RATE: 18 BRPM | TEMPERATURE: 98 F

## 2024-01-04 VITALS
OXYGEN SATURATION: 100 % | DIASTOLIC BLOOD PRESSURE: 87 MMHG | TEMPERATURE: 98 F | HEART RATE: 80 BPM | RESPIRATION RATE: 18 BRPM | SYSTOLIC BLOOD PRESSURE: 123 MMHG

## 2024-01-04 DIAGNOSIS — N75.0 CYST OF BARTHOLIN'S GLAND: Chronic | ICD-10-CM

## 2024-01-04 LAB
FLUAV AG NPH QL: SIGNIFICANT CHANGE UP
FLUAV AG NPH QL: SIGNIFICANT CHANGE UP
FLUBV AG NPH QL: SIGNIFICANT CHANGE UP
FLUBV AG NPH QL: SIGNIFICANT CHANGE UP
RSV RNA NPH QL NAA+NON-PROBE: SIGNIFICANT CHANGE UP
RSV RNA NPH QL NAA+NON-PROBE: SIGNIFICANT CHANGE UP
SARS-COV-2 RNA SPEC QL NAA+PROBE: DETECTED
SARS-COV-2 RNA SPEC QL NAA+PROBE: DETECTED

## 2024-01-04 PROCEDURE — 87637 SARSCOV2&INF A&B&RSV AMP PRB: CPT

## 2024-01-04 PROCEDURE — 99284 EMERGENCY DEPT VISIT MOD MDM: CPT | Mod: 25

## 2024-01-04 PROCEDURE — 99283 EMERGENCY DEPT VISIT LOW MDM: CPT

## 2024-01-04 RX ORDER — IBUPROFEN 200 MG
600 TABLET ORAL ONCE
Refills: 0 | Status: COMPLETED | OUTPATIENT
Start: 2024-01-04 | End: 2024-01-04

## 2024-01-04 RX ADMIN — Medication 600 MILLIGRAM(S): at 03:49

## 2024-01-04 NOTE — ED PROVIDER NOTE - OBJECTIVE STATEMENT
38-year-old female with no significant past medical history presents to ED complaining of URI-like symptoms cough sore throat fever body ache runny nose for 1 day.  Not vaccinated for flu

## 2024-01-04 NOTE — ED PROVIDER NOTE - CLINICAL SUMMARY MEDICAL DECISION MAKING FREE TEXT BOX
38-year-old female with no significant past medical history presents to ED complaining of URI-like symptoms cough sore throat fever body ache runny nose for 1 day.  Not vaccinated for flu, pt was tested for Flu and Covid , advised rest, hydration ,and Ibuprofen for pain or fever

## 2024-01-04 NOTE — ED ADULT NURSE NOTE - NSICDXPASTSURGICALHX_GEN_ALL_CORE_FT
PAST SURGICAL HISTORY:  Bartholin cyst in november, had drainage at [facility]     PAST SURGICAL HISTORY:  Bartholin cyst in november, had drainage at [facility]

## 2024-01-04 NOTE — ED PROVIDER NOTE - NSICDXPASTSURGICALHX_GEN_ALL_CORE_FT
PAST SURGICAL HISTORY:  Bartholin cyst in november, had drainage at [facility]     PAST SURGICAL HISTORY:  Bartholin cyst in november, had drainage at [facility]

## 2024-01-04 NOTE — ED ADULT NURSE NOTE - NSFALLUNIVINTERV_ED_ALL_ED
Bed/Stretcher in lowest position, wheels locked, appropriate side rails in place/Call bell, personal items and telephone in reach/Instruct patient to call for assistance before getting out of bed/chair/stretcher/Non-slip footwear applied when patient is off stretcher/[place] to call system/Physically safe environment - no spills, clutter or unnecessary equipment/Purposeful proactive rounding/Room/bathroom lighting operational, light cord in reach Bed/Stretcher in lowest position, wheels locked, appropriate side rails in place/Call bell, personal items and telephone in reach/Instruct patient to call for assistance before getting out of bed/chair/stretcher/Non-slip footwear applied when patient is off stretcher/[place] to call system/Physically safe environment - no spills, clutter or unnecessary equipment/Purposeful proactive rounding/Room/bathroom lighting operational, light cord in reach

## 2024-01-04 NOTE — ED PROVIDER NOTE - NSFOLLOWUPINSTRUCTIONS_ED_ALL_ED_FT
Viral Respiratory Infection    A viral respiratory infection is an illness that affects parts of the body used for breathing, like the lungs, nose, and throat. It is caused by a germ called a virus. Symptoms can include runny nose, coughing, sneezing, fatigue, body aches, sore throat, fever, or headache. Over the counter medicine can be used to manage the symptoms but the infection typically goes away on its own in 5 to 10 days.   drink lot of liquids,  Take Tylenol or Ibuprofen for fever or pain as directed    SEEK IMMEDIATE MEDICAL CARE IF YOU HAVE ANY OF THE FOLLOWING SYMPTOMS: shortness of breath, chest pain, fever over 10 days, or lightheadedness/dizziness.

## 2024-01-05 ENCOUNTER — NON-APPOINTMENT (OUTPATIENT)
Age: 39
End: 2024-01-05

## 2024-02-27 ENCOUNTER — EMERGENCY (EMERGENCY)
Facility: HOSPITAL | Age: 39
LOS: 1 days | Discharge: HOME | End: 2024-02-27
Attending: EMERGENCY MEDICINE | Admitting: EMERGENCY MEDICINE
Payer: MEDICAID

## 2024-02-27 VITALS
RESPIRATION RATE: 17 BRPM | WEIGHT: 175.05 LBS | DIASTOLIC BLOOD PRESSURE: 80 MMHG | HEIGHT: 63 IN | SYSTOLIC BLOOD PRESSURE: 132 MMHG | TEMPERATURE: 98 F | OXYGEN SATURATION: 99 % | HEART RATE: 78 BPM

## 2024-02-27 DIAGNOSIS — N75.0 CYST OF BARTHOLIN'S GLAND: Chronic | ICD-10-CM

## 2024-02-27 PROCEDURE — 99285 EMERGENCY DEPT VISIT HI MDM: CPT | Mod: 25

## 2024-02-27 NOTE — ED ADULT TRIAGE NOTE - CHIEF COMPLAINT QUOTE
Patient presents to ED with complaint of generalized abdominal pain x several hours. Patient states she drank several beers tonight and then pain began.

## 2024-02-28 VITALS
RESPIRATION RATE: 18 BRPM | HEART RATE: 72 BPM | DIASTOLIC BLOOD PRESSURE: 84 MMHG | OXYGEN SATURATION: 99 % | SYSTOLIC BLOOD PRESSURE: 139 MMHG | TEMPERATURE: 98 F

## 2024-02-28 LAB
ALBUMIN SERPL ELPH-MCNC: 3.7 G/DL — SIGNIFICANT CHANGE UP (ref 3.3–5)
ALP SERPL-CCNC: 75 U/L — SIGNIFICANT CHANGE UP (ref 40–120)
ALT FLD-CCNC: 28 U/L — SIGNIFICANT CHANGE UP (ref 10–45)
ANION GAP SERPL CALC-SCNC: 13 MMOL/L — SIGNIFICANT CHANGE UP (ref 5–17)
APPEARANCE UR: CLEAR — SIGNIFICANT CHANGE UP
AST SERPL-CCNC: 18 U/L — SIGNIFICANT CHANGE UP (ref 10–40)
BACTERIA # UR AUTO: ABNORMAL /HPF
BASOPHILS # BLD AUTO: 0.07 K/UL — SIGNIFICANT CHANGE UP (ref 0–0.2)
BASOPHILS NFR BLD AUTO: 0.7 % — SIGNIFICANT CHANGE UP (ref 0–2)
BILIRUB SERPL-MCNC: 0.2 MG/DL — SIGNIFICANT CHANGE UP (ref 0.2–1.2)
BILIRUB UR-MCNC: NEGATIVE — SIGNIFICANT CHANGE UP
BUN SERPL-MCNC: 16 MG/DL — SIGNIFICANT CHANGE UP (ref 7–23)
CALCIUM SERPL-MCNC: 9.4 MG/DL — SIGNIFICANT CHANGE UP (ref 8.4–10.5)
CHLORIDE SERPL-SCNC: 106 MMOL/L — SIGNIFICANT CHANGE UP (ref 96–108)
CO2 SERPL-SCNC: 27 MMOL/L — SIGNIFICANT CHANGE UP (ref 22–31)
COLOR SPEC: YELLOW — SIGNIFICANT CHANGE UP
CREAT SERPL-MCNC: 0.92 MG/DL — SIGNIFICANT CHANGE UP (ref 0.5–1.3)
DIFF PNL FLD: NEGATIVE — SIGNIFICANT CHANGE UP
EGFR: 82 ML/MIN/1.73M2 — SIGNIFICANT CHANGE UP
EOSINOPHIL # BLD AUTO: 0.51 K/UL — HIGH (ref 0–0.5)
EOSINOPHIL NFR BLD AUTO: 4.7 % — SIGNIFICANT CHANGE UP (ref 0–6)
EPI CELLS # UR: 25 — SIGNIFICANT CHANGE UP
GLUCOSE SERPL-MCNC: 85 MG/DL — SIGNIFICANT CHANGE UP (ref 70–99)
GLUCOSE UR QL: NEGATIVE MG/DL — SIGNIFICANT CHANGE UP
HCT VFR BLD CALC: 39.2 % — SIGNIFICANT CHANGE UP (ref 34.5–45)
HGB BLD-MCNC: 13.1 G/DL — SIGNIFICANT CHANGE UP (ref 11.5–15.5)
HYALINE CASTS # UR AUTO: SIGNIFICANT CHANGE UP
IMM GRANULOCYTES NFR BLD AUTO: 0.4 % — SIGNIFICANT CHANGE UP (ref 0–0.9)
KETONES UR-MCNC: NEGATIVE MG/DL — SIGNIFICANT CHANGE UP
LEUKOCYTE ESTERASE UR-ACNC: ABNORMAL
LIDOCAIN IGE QN: 60 U/L — SIGNIFICANT CHANGE UP (ref 16–77)
LYMPHOCYTES # BLD AUTO: 47 % — HIGH (ref 13–44)
LYMPHOCYTES # BLD AUTO: 5.05 K/UL — HIGH (ref 1–3.3)
MCHC RBC-ENTMCNC: 30.3 PG — SIGNIFICANT CHANGE UP (ref 27–34)
MCHC RBC-ENTMCNC: 33.4 GM/DL — SIGNIFICANT CHANGE UP (ref 32–36)
MCV RBC AUTO: 90.7 FL — SIGNIFICANT CHANGE UP (ref 80–100)
MONOCYTES # BLD AUTO: 0.8 K/UL — SIGNIFICANT CHANGE UP (ref 0–0.9)
MONOCYTES NFR BLD AUTO: 7.4 % — SIGNIFICANT CHANGE UP (ref 2–14)
NEUTROPHILS # BLD AUTO: 4.27 K/UL — SIGNIFICANT CHANGE UP (ref 1.8–7.4)
NEUTROPHILS NFR BLD AUTO: 39.8 % — LOW (ref 43–77)
NITRITE UR-MCNC: NEGATIVE — SIGNIFICANT CHANGE UP
NRBC # BLD: 0 /100 WBCS — SIGNIFICANT CHANGE UP (ref 0–0)
PH UR: 5.5 — SIGNIFICANT CHANGE UP (ref 5–8)
PLATELET # BLD AUTO: 265 K/UL — SIGNIFICANT CHANGE UP (ref 150–400)
POTASSIUM SERPL-MCNC: 3.9 MMOL/L — SIGNIFICANT CHANGE UP (ref 3.5–5.3)
POTASSIUM SERPL-SCNC: 3.9 MMOL/L — SIGNIFICANT CHANGE UP (ref 3.5–5.3)
PROT SERPL-MCNC: 8 G/DL — SIGNIFICANT CHANGE UP (ref 6–8.3)
PROT UR-MCNC: NEGATIVE MG/DL — SIGNIFICANT CHANGE UP
RBC # BLD: 4.32 M/UL — SIGNIFICANT CHANGE UP (ref 3.8–5.2)
RBC # FLD: 12.5 % — SIGNIFICANT CHANGE UP (ref 10.3–14.5)
RBC CASTS # UR COMP ASSIST: 0 /HPF — SIGNIFICANT CHANGE UP (ref 0–4)
SODIUM SERPL-SCNC: 146 MMOL/L — HIGH (ref 135–145)
SP GR SPEC: 1.02 — SIGNIFICANT CHANGE UP (ref 1–1.03)
UROBILINOGEN FLD QL: 0.2 MG/DL — SIGNIFICANT CHANGE UP (ref 0.2–1)
WBC # BLD: 10.74 K/UL — HIGH (ref 3.8–10.5)
WBC # FLD AUTO: 10.74 K/UL — HIGH (ref 3.8–10.5)
WBC UR QL: 5 /HPF — SIGNIFICANT CHANGE UP (ref 0–5)

## 2024-02-28 PROCEDURE — 80053 COMPREHEN METABOLIC PANEL: CPT

## 2024-02-28 PROCEDURE — 36415 COLL VENOUS BLD VENIPUNCTURE: CPT

## 2024-02-28 PROCEDURE — 74177 CT ABD & PELVIS W/CONTRAST: CPT | Mod: MC

## 2024-02-28 PROCEDURE — 74177 CT ABD & PELVIS W/CONTRAST: CPT | Mod: 26,MC

## 2024-02-28 PROCEDURE — 96375 TX/PRO/DX INJ NEW DRUG ADDON: CPT

## 2024-02-28 PROCEDURE — 99284 EMERGENCY DEPT VISIT MOD MDM: CPT | Mod: 25

## 2024-02-28 PROCEDURE — 83690 ASSAY OF LIPASE: CPT

## 2024-02-28 PROCEDURE — 81001 URINALYSIS AUTO W/SCOPE: CPT

## 2024-02-28 PROCEDURE — 96361 HYDRATE IV INFUSION ADD-ON: CPT

## 2024-02-28 PROCEDURE — 96374 THER/PROPH/DIAG INJ IV PUSH: CPT | Mod: XU

## 2024-02-28 PROCEDURE — 85025 COMPLETE CBC W/AUTO DIFF WBC: CPT

## 2024-02-28 RX ORDER — SODIUM CHLORIDE 9 MG/ML
1000 INJECTION INTRAMUSCULAR; INTRAVENOUS; SUBCUTANEOUS ONCE
Refills: 0 | Status: COMPLETED | OUTPATIENT
Start: 2024-02-28 | End: 2024-02-28

## 2024-02-28 RX ORDER — MORPHINE SULFATE 50 MG/1
4 CAPSULE, EXTENDED RELEASE ORAL ONCE
Refills: 0 | Status: DISCONTINUED | OUTPATIENT
Start: 2024-02-28 | End: 2024-02-28

## 2024-02-28 RX ORDER — ONDANSETRON 8 MG/1
4 TABLET, FILM COATED ORAL ONCE
Refills: 0 | Status: COMPLETED | OUTPATIENT
Start: 2024-02-28 | End: 2024-02-28

## 2024-02-28 RX ADMIN — ONDANSETRON 4 MILLIGRAM(S): 8 TABLET, FILM COATED ORAL at 01:38

## 2024-02-28 RX ADMIN — SODIUM CHLORIDE 1000 MILLILITER(S): 9 INJECTION INTRAMUSCULAR; INTRAVENOUS; SUBCUTANEOUS at 01:38

## 2024-02-28 RX ADMIN — MORPHINE SULFATE 4 MILLIGRAM(S): 50 CAPSULE, EXTENDED RELEASE ORAL at 02:08

## 2024-02-28 RX ADMIN — SODIUM CHLORIDE 1000 MILLILITER(S): 9 INJECTION INTRAMUSCULAR; INTRAVENOUS; SUBCUTANEOUS at 02:38

## 2024-02-28 RX ADMIN — MORPHINE SULFATE 4 MILLIGRAM(S): 50 CAPSULE, EXTENDED RELEASE ORAL at 01:38

## 2024-02-28 NOTE — ED PROVIDER NOTE - CLINICAL SUMMARY MEDICAL DECISION MAKING FREE TEXT BOX
38-year-old female past with history of hypertension presenting with epigastric discomfort.  Differential includes but is not limited to pancreatitis, cholelithiasis, biliary colic, hiatal hernia, gastritis.  Will get laboratory studies as well as a CT scan to help delineate.  Will treat symptomatically for pain and reassess

## 2024-02-28 NOTE — ED PROVIDER NOTE - DATE/TIME 1
"   Pari Hill DNP   [address] [place] [address]     PATIENT NAME: Ritika Presley  : 2002  DATE: 5/10/23  MRN: [id number]      Billing Provider: Pari Hill DNP  Level of Service:   Patient PCP Information       Provider PCP Type    Primary Doctor No General            Reason for Visit / Chief Complaint: Nausea and Emesis       Update PCP  Update Chief Complaint         History of Present Illness / Problem Focused Workflow     Ritika Presley presents to the clinic with Nausea and Emesis     Nausea  Associated symptoms include nausea and vomiting.   Emesis     Review of Systems     Review of Systems   Gastrointestinal:  Positive for nausea and vomiting.      Medical / Social / Family History     Past Medical History:   Diagnosis Date    Acne        History reviewed. No pertinent surgical history.    Social History  Ms.  reports that she has never smoked. She has never been exposed to tobacco smoke. She has never used smokeless tobacco. She reports that she does not drink alcohol and does not use drugs.    Family History  Ms.'s family history includes No Known Problems in her father and mother.    Medications and Allergies     Medications  No outpatient medications have been marked as taking for the 5/10/23 encounter (Office Visit) with Pari Hill DNP.       Allergies  Review of patient's allergies indicates:  No Known Allergies    Physical Examination   /69   Pulse 94   Temp 99.3 °F (37.4 °C)   Ht 5' 6" (1.676 m)   Wt 51.8 kg (114 lb 3.2 oz)   LMP 2023   BMI 18.43 kg/m²    Physical Exam  Vitals and nursing note reviewed.   Constitutional:       Appearance: Normal appearance. She is normal weight. She is ill-appearing.   HENT:      Head: Normocephalic.      Nose: Nose normal.      Mouth/Throat:      Mouth: Mucous membranes are moist.   Eyes:      Extraocular Movements: Extraocular movements intact.      Conjunctiva/sclera: Conjunctivae normal.      Pupils: " Pupils are equal, round, and reactive to light.   Cardiovascular:      Rate and Rhythm: Normal rate and regular rhythm.      Pulses: Normal pulses.      Heart sounds: Normal heart sounds.   Pulmonary:      Effort: Pulmonary effort is normal.      Breath sounds: Normal breath sounds.   Abdominal:      General: Abdomen is flat. Bowel sounds are normal. There is distension.      Palpations: Abdomen is soft.      Tenderness: There is abdominal tenderness.   Musculoskeletal:         General: Normal range of motion.      Cervical back: Normal range of motion and neck supple.   Skin:     General: Skin is warm and dry.      Capillary Refill: Capillary refill takes less than 2 seconds.   Neurological:      General: No focal deficit present.      Mental Status: She is alert and oriented to person, place, and time. Mental status is at baseline.   Psychiatric:         Mood and Affect: Mood normal.         Behavior: Behavior normal.         Thought Content: Thought content normal.         Judgment: Judgment normal.        Assessment and Plan (including Health Maintenance)      Problem List  Smart Sets  Document Outside HM   :    Plan:         Health Maintenance Due   Topic Date Due    Hepatitis C Screening  Never done    Lipid Panel  Never done    COVID-19 Vaccine (1) Never done    HPV Vaccines (1 - 2-dose series) Never done    TETANUS VACCINE  Never done    Pap Smear  Never done    Chlamydia Screening  10/01/2022       Problem List Items Addressed This Visit          ID    Viral gastroenteritis - Primary       GI    Nausea and vomiting       Health Maintenance Topics with due status: Not Due       Topic Last Completion Date    Influenza Vaccine Not Due       No future appointments.         Signature:  Pari Hill DNP      [address] [place] [address]    Date of encounter: 5/10/23   28-Feb-2024 02:58

## 2024-02-28 NOTE — ED ADULT NURSE NOTE - OBJECTIVE STATEMENT
Pt aaox4, came in to ED complaining of abdominal pain that started tonight after having 2 beers and pasta. Pain 8/10. Pt also nausea and vomited x1. Denies diarrhea, chest pain, sob

## 2024-02-28 NOTE — ED ADULT NURSE NOTE - NSICDXPASTSURGICALHX_GEN_ALL_CORE_FT
PAST SURGICAL HISTORY:  Bartholin cyst in november, had drainage at [facility]

## 2024-02-28 NOTE — ED PROVIDER NOTE - OBJECTIVE STATEMENT
38-year-old female no significant past medical history presenting with epigastric discomfort for the last few hours.  Patient dates that she had a large meal with a couple of beers.  Woke up then with epigastric abdominal pain.  Says it feels like it is protruding.  This is been happening more more often after certain meals.  Nausea no emesis no diarrhea no fever

## 2024-02-28 NOTE — ED PROVIDER NOTE - PROGRESS NOTE DETAILS
Patient's labs unremarkable.  CT scan does show small hiatal hernia which does explain her symptoms.  She does feel significantly better and tolerated p.o.  Will discharge at this time

## 2024-02-28 NOTE — ED PROVIDER NOTE - PATIENT PORTAL LINK FT
You can access the FollowMyHealth Patient Portal offered by [facility] by registering at the following website: [email or web address]. By joining [employer]’s FollowMyHealth portal, you will also be able to view your health information using other applications (apps) compatible with our system.

## 2024-02-28 NOTE — ED PROVIDER NOTE - PHYSICAL EXAMINATION
Vitals: I have reviewed the patients vital signs  General: nontoxic appearing  HEENT: Atraumatic, normocephalic, airway patent  Eyes: EOMI, tracking appropriately  Neck: no tracheal deviation  Chest/Lungs: no trauma, symmetric chest rise, speaking in complete sentences,  no resp distress  Heart: skin and extremities well perfused, regular rate and rhythm  Neuro: A+Ox3, appears non focal  MSK: no deformities  Skin: no cyanosis, no jaundice   Psych:  Normal mood and affect  Abdomen: Soft mild tender palpation epigastric region no rebound no guarding

## 2024-02-28 NOTE — ED PROVIDER NOTE - NSFOLLOWUPINSTRUCTIONS_ED_ALL_ED_FT
Abdominal Pain    Many things can cause abdominal pain. Many times, abdominal pain is not caused by a disease and will improve without treatment. Your health care provider will do a physical exam to determine if there is a dangerous cause of your pain; blood tests and imaging may help determine the cause of your pain. However, in many cases, no cause may be found and you may need further testing as an outpatient. Monitor your abdominal pain for any changes.     SEEK IMMEDIATE MEDICAL CARE IF YOU HAVE ANY OF THE FOLLOWING SYMPTOMS: worsening abdominal pain, uncontrollable vomiting, profuse diarrhea, inability to have bowel movements or pass gas, black or bloody stools, fever accompanying chest pain or back pain, or fainting. These symptoms may represent a serious problem that is an emergency. Do not wait to see if the symptoms will go away. Get medical help right away. Call 911 and do not drive yourself to the hospital.     Hiatal Hernia  Upper body showing the esophagus, stomach, and diaphragm with close-ups of a normal stomach and one with a hiatal hernia.  A hiatal hernia occurs when part of the stomach slides above the muscle that separates the abdomen from the chest (diaphragm). A person can be born with a hiatal hernia (congenital), or it may develop over time. In almost all cases of hiatal hernia, only the top part of the stomach pushes through the diaphragm.    Many people have a hiatal hernia with no symptoms. The larger the hernia, the more likely it is that you will have symptoms. In some cases, a hiatal hernia allows stomach acid to flow back into the tube that carries food from your mouth to your stomach (esophagus). This may cause heartburn symptoms. The development of heartburn symptoms may mean that you have a condition called gastroesophageal reflux disease (GERD).    What are the causes?  This condition is caused by a weakness in the opening (hiatus) where the esophagus passes through the diaphragm to attach to the upper part of the stomach. A person may be born with a weakness in the hiatus, or a weakness can develop over time.    What increases the risk?  This condition is more likely to develop in:  Older people. Age is a major risk factor for a hiatal hernia, especially if you are over the age of 50.  Pregnant women.  People who are overweight.  People who have frequent constipation.  What are the signs or symptoms?  Symptoms of this condition usually develop in the form of GERD symptoms. Symptoms include:  Heartburn.  Upset stomach (indigestion).  Trouble swallowing.  Coughing or wheezing. Wheezing is making high-pitched whistling sounds when you breathe.  Sore throat.  Chest pain.  Nausea and vomiting.  How is this diagnosed?  This condition may be diagnosed during testing for GERD. Tests that may be done include:  X-rays of your stomach or chest.  An upper gastrointestinal (GI) series. This is an X-ray exam of your GI tract that is taken after you swallow a chalky liquid that shows up clearly on the X-ray.  Endoscopy. This is a procedure to look into your stomach using a thin, flexible tube that has a tiny camera and light on the end of it.  How is this treated?  This condition may be treated by:  Dietary and lifestyle changes to help reduce GERD symptoms.  Medicines. These may include:  Over-the-counter antacids.  Medicines that make your stomach empty more quickly.  Medicines that block the production of stomach acid (H2 blockers).  Stronger medicines to reduce stomach acid (proton pump inhibitors).  Surgery to repair the hernia, if other treatments are not helping.  If you have no symptoms, you may not need treatment.    Follow these instructions at home:  Lifestyle and activity    Do not use any products that contain nicotine or tobacco. These products include cigarettes, chewing tobacco, and vaping devices, such as e-cigarettes. If you need help quitting, ask your health care provider.  Try to achieve and maintain a healthy body weight.  Avoid putting pressure on your abdomen. Anything that puts pressure on your abdomen increases the amount of acid that may be pushed up into your esophagus.  Avoid bending over, especially after eating.  Raise the head of your bed by putting blocks under the legs. This keeps your head and esophagus higher than your stomach.  Do not wear tight clothing around your chest or stomach.  Try not to strain when having a bowel movement, when urinating, or when lifting heavy objects.  Eating and drinking    Avoid foods that can worsen GERD symptoms. These may include:  Fatty foods, like fried foods.  Citrus fruits, like oranges or lemon.  Other foods and drinks that contain acid, like orange juice or tomatoes.  Spicy food.  Chocolate.  Eat frequent small meals instead of three large meals a day. This helps prevent your stomach from getting too full.  Eat slowly.  Do not lie down right after eating.  Do not eat 1–2 hours before bed.  Do not drink beverages with caffeine. These include cola, coffee, cocoa, and tea.  Do not drink alcohol.  General instructions    Take over-the-counter and prescription medicines only as told by your health care provider.  Keep all follow-up visits. Your health care provider will want to check that any new prescribed medicines are helping your symptoms.  Contact a health care provider if:  Your symptoms are not controlled with medicines or lifestyle changes.  You are having trouble swallowing.  You have coughing or wheezing that will not go away.  Your pain is getting worse.  Your pain spreads to your arms, neck, jaw, teeth, or back.  You feel nauseous or you vomit.  Get help right away if:  You have shortness of breath.  You vomit blood.  You have bright red blood in your stools.  You have black, tarry stools.  These symptoms may be an emergency. Get help right away. Call 911.  Do not wait to see if the symptoms will go away.  Do not drive yourself to the hospital.  Summary  A hiatal hernia occurs when part of the stomach slides above the muscle that separates the abdomen from the chest.  A person may be born with a weakness in the hiatus, or a weakness can develop over time.  Symptoms of a hiatal hernia may include heartburn, trouble swallowing, or sore throat.  Management of a hiatal hernia includes eating frequent small meals instead of three large meals a day.  Get help right away if you vomit blood, have bright red blood in your stools, or have black, tarry stools.

## 2024-03-10 ENCOUNTER — EMERGENCY (EMERGENCY)
Facility: HOSPITAL | Age: 39
LOS: 1 days | Discharge: HOME | End: 2024-03-10
Attending: STUDENT IN AN ORGANIZED HEALTH CARE EDUCATION/TRAINING PROGRAM | Admitting: STUDENT IN AN ORGANIZED HEALTH CARE EDUCATION/TRAINING PROGRAM
Payer: MEDICAID

## 2024-03-10 VITALS
RESPIRATION RATE: 20 BRPM | OXYGEN SATURATION: 98 % | HEART RATE: 88 BPM | DIASTOLIC BLOOD PRESSURE: 68 MMHG | SYSTOLIC BLOOD PRESSURE: 124 MMHG

## 2024-03-10 VITALS
SYSTOLIC BLOOD PRESSURE: 137 MMHG | WEIGHT: 185.41 LBS | HEIGHT: 63 IN | HEART RATE: 106 BPM | TEMPERATURE: 97 F | DIASTOLIC BLOOD PRESSURE: 85 MMHG | RESPIRATION RATE: 18 BRPM | OXYGEN SATURATION: 97 %

## 2024-03-10 DIAGNOSIS — N75.0 CYST OF BARTHOLIN'S GLAND: Chronic | ICD-10-CM

## 2024-03-10 LAB
ALBUMIN SERPL ELPH-MCNC: 3.5 G/DL — SIGNIFICANT CHANGE UP (ref 3.3–5)
ALP SERPL-CCNC: 55 U/L — SIGNIFICANT CHANGE UP (ref 40–120)
ALT FLD-CCNC: 33 U/L — SIGNIFICANT CHANGE UP (ref 10–45)
ANION GAP SERPL CALC-SCNC: 14 MMOL/L — SIGNIFICANT CHANGE UP (ref 5–17)
AST SERPL-CCNC: 24 U/L — SIGNIFICANT CHANGE UP (ref 10–40)
BASOPHILS # BLD AUTO: 0.05 K/UL — SIGNIFICANT CHANGE UP (ref 0–0.2)
BASOPHILS NFR BLD AUTO: 0.6 % — SIGNIFICANT CHANGE UP (ref 0–2)
BILIRUB SERPL-MCNC: 0.2 MG/DL — SIGNIFICANT CHANGE UP (ref 0.2–1.2)
BUN SERPL-MCNC: 13 MG/DL — SIGNIFICANT CHANGE UP (ref 7–23)
CALCIUM SERPL-MCNC: 8.8 MG/DL — SIGNIFICANT CHANGE UP (ref 8.4–10.5)
CHLORIDE SERPL-SCNC: 106 MMOL/L — SIGNIFICANT CHANGE UP (ref 96–108)
CO2 SERPL-SCNC: 23 MMOL/L — SIGNIFICANT CHANGE UP (ref 22–31)
CREAT SERPL-MCNC: 0.87 MG/DL — SIGNIFICANT CHANGE UP (ref 0.5–1.3)
EGFR: 87 ML/MIN/1.73M2 — SIGNIFICANT CHANGE UP
EOSINOPHIL # BLD AUTO: 0.36 K/UL — SIGNIFICANT CHANGE UP (ref 0–0.5)
EOSINOPHIL NFR BLD AUTO: 4.5 % — SIGNIFICANT CHANGE UP (ref 0–6)
GLUCOSE SERPL-MCNC: 104 MG/DL — HIGH (ref 70–99)
HCT VFR BLD CALC: 34.4 % — LOW (ref 34.5–45)
HGB BLD-MCNC: 11.5 G/DL — SIGNIFICANT CHANGE UP (ref 11.5–15.5)
IMM GRANULOCYTES NFR BLD AUTO: 0.3 % — SIGNIFICANT CHANGE UP (ref 0–0.9)
LYMPHOCYTES # BLD AUTO: 3.41 K/UL — HIGH (ref 1–3.3)
LYMPHOCYTES # BLD AUTO: 43 % — SIGNIFICANT CHANGE UP (ref 13–44)
MCHC RBC-ENTMCNC: 30.4 PG — SIGNIFICANT CHANGE UP (ref 27–34)
MCHC RBC-ENTMCNC: 33.4 GM/DL — SIGNIFICANT CHANGE UP (ref 32–36)
MCV RBC AUTO: 91 FL — SIGNIFICANT CHANGE UP (ref 80–100)
MONOCYTES # BLD AUTO: 0.52 K/UL — SIGNIFICANT CHANGE UP (ref 0–0.9)
MONOCYTES NFR BLD AUTO: 6.6 % — SIGNIFICANT CHANGE UP (ref 2–14)
NEUTROPHILS # BLD AUTO: 3.57 K/UL — SIGNIFICANT CHANGE UP (ref 1.8–7.4)
NEUTROPHILS NFR BLD AUTO: 45 % — SIGNIFICANT CHANGE UP (ref 43–77)
NRBC # BLD: 0 /100 WBCS — SIGNIFICANT CHANGE UP (ref 0–0)
PLATELET # BLD AUTO: 286 K/UL — SIGNIFICANT CHANGE UP (ref 150–400)
POTASSIUM SERPL-MCNC: 3.7 MMOL/L — SIGNIFICANT CHANGE UP (ref 3.5–5.3)
POTASSIUM SERPL-SCNC: 3.7 MMOL/L — SIGNIFICANT CHANGE UP (ref 3.5–5.3)
PROT SERPL-MCNC: 7.6 G/DL — SIGNIFICANT CHANGE UP (ref 6–8.3)
RBC # BLD: 3.78 M/UL — LOW (ref 3.8–5.2)
RBC # FLD: 12.8 % — SIGNIFICANT CHANGE UP (ref 10.3–14.5)
SODIUM SERPL-SCNC: 143 MMOL/L — SIGNIFICANT CHANGE UP (ref 135–145)
TROPONIN I, HIGH SENSITIVITY RESULT: <4 NG/L — SIGNIFICANT CHANGE UP
WBC # BLD: 7.93 K/UL — SIGNIFICANT CHANGE UP (ref 3.8–10.5)
WBC # FLD AUTO: 7.93 K/UL — SIGNIFICANT CHANGE UP (ref 3.8–10.5)

## 2024-03-10 PROCEDURE — 93005 ELECTROCARDIOGRAM TRACING: CPT

## 2024-03-10 PROCEDURE — 96374 THER/PROPH/DIAG INJ IV PUSH: CPT

## 2024-03-10 PROCEDURE — 99285 EMERGENCY DEPT VISIT HI MDM: CPT | Mod: 25

## 2024-03-10 PROCEDURE — 99284 EMERGENCY DEPT VISIT MOD MDM: CPT | Mod: 25

## 2024-03-10 PROCEDURE — 93010 ELECTROCARDIOGRAM REPORT: CPT

## 2024-03-10 PROCEDURE — 80053 COMPREHEN METABOLIC PANEL: CPT

## 2024-03-10 PROCEDURE — 36415 COLL VENOUS BLD VENIPUNCTURE: CPT

## 2024-03-10 PROCEDURE — 85025 COMPLETE CBC W/AUTO DIFF WBC: CPT

## 2024-03-10 PROCEDURE — 84484 ASSAY OF TROPONIN QUANT: CPT

## 2024-03-10 RX ORDER — SODIUM CHLORIDE 9 MG/ML
1000 INJECTION INTRAMUSCULAR; INTRAVENOUS; SUBCUTANEOUS ONCE
Refills: 0 | Status: COMPLETED | OUTPATIENT
Start: 2024-03-10 | End: 2024-03-10

## 2024-03-10 RX ADMIN — SODIUM CHLORIDE 1000 MILLILITER(S): 9 INJECTION INTRAMUSCULAR; INTRAVENOUS; SUBCUTANEOUS at 04:58

## 2024-03-10 RX ADMIN — Medication 1 MILLIGRAM(S): at 04:58

## 2024-03-10 NOTE — ED PROVIDER NOTE - OBJECTIVE STATEMENT
88-year-old female presents to the ER for palpitations.  Patient states that she was out drinking alcohol, smoked weed, and snorted cocaine.  Patient states afterwards she began feeling "off".  Patient felt like her heart was beating fast.  Came to the ER for further evaluation.  Denies any chest pain, shortness of breath, vomiting, diarrhea, abdominal pain, neck pain, headache

## 2024-03-10 NOTE — ED ADULT NURSE NOTE - OBJECTIVE STATEMENT
P tis alert, came to the ER due to palpitation today but denies any chest pain. Pt admitted to having some drinks today. No medical problem, right knee surgery.

## 2024-03-10 NOTE — ED PROVIDER NOTE - PHYSICAL EXAMINATION
Vital signs reviewed  GENERAL: Patient nontoxic appearing, NAD  HEAD: NCAT  EYES: Anicteric  ENT: MMM  NECK: Supple, non tender  RESPIRATORY: Normal respiratory effort. CTA B/L. No wheezing, rales, rhonchi  CARDIOVASCULAR: tachycardic s1s2  ABDOMEN: Soft. Nondistended. Nontender. No guarding or rebound. No CVA tenderness.  MUSCULOSKELETAL/EXTREMITIES: Brisk cap refill. 2+ radial pulses. No leg edema.  SKIN:  Warm and dry  NEURO: AAOx3. No gross FND.  PSYCHIATRIC: Cooperative. Affect appropriate.

## 2024-03-10 NOTE — ED ADULT NURSE NOTE - NSICDXPASTSURGICALHX_GEN_ALL_CORE_FT
PAST SURGICAL HISTORY:  Bartholin cyst in november, had drainage at [facility]

## 2024-03-10 NOTE — ED PROVIDER NOTE - PROGRESS NOTE DETAILS
[facility] Attending patient feels better, labs and ekg unremarkable  friend bedside, will drive patient home  stable for dc, given return oprecautions for any worsening sxs to come back to ed

## 2024-03-10 NOTE — ED PROVIDER NOTE - PATIENT PORTAL LINK FT
You can access the FollowMyHealth Patient Portal offered by [facility] by registering at the following website: [email or web address]. By joining [employer]’s FollowMyHealth portal, you will also be able to view your health information using other applications (apps) compatible with our system.

## 2024-03-10 NOTE — ED PROVIDER NOTE - CLINICAL SUMMARY MEDICAL DECISION MAKING FREE TEXT BOX
ddx likely  due to polysubstance abuse pending EKGs/cardiac monitor/labs  2) reassess  The CXR assists in r/o PNA, Ptx & esophageal tears.  The pt doesn't appear to have a PE based on the Wells Score & there is no apparent DVT.  There are no signs of pericarditis, endocarditis, or myocarditis based on hx, risk factor analysis & the ED EKG.  There is no fever.  There also doesn't appear to be an aortic dissection based on hx, exam, and signs.

## 2024-03-10 NOTE — ED ADULT NURSE NOTE - NSFALLUNIVINTERV_ED_ALL_ED
Bed/Stretcher in lowest position, wheels locked, appropriate side rails in place/Call bell, personal items and telephone in reach/Instruct patient to call for assistance before getting out of bed/chair/stretcher/Non-slip footwear applied when patient is off stretcher/[place] to call system/Physically safe environment - no spills, clutter or unnecessary equipment/Purposeful proactive rounding/Room/bathroom lighting operational, light cord in reach

## 2024-03-12 ENCOUNTER — APPOINTMENT (OUTPATIENT)
Dept: INTERNAL MEDICINE | Facility: CLINIC | Age: 39
End: 2024-03-12
Payer: MEDICAID

## 2024-03-12 VITALS
BODY MASS INDEX: 32.39 KG/M2 | SYSTOLIC BLOOD PRESSURE: 120 MMHG | WEIGHT: 176 LBS | TEMPERATURE: 97.2 F | DIASTOLIC BLOOD PRESSURE: 82 MMHG | OXYGEN SATURATION: 98 % | HEART RATE: 79 BPM | RESPIRATION RATE: 16 BRPM | HEIGHT: 62 IN

## 2024-03-12 DIAGNOSIS — R53.83 OTHER FATIGUE: ICD-10-CM

## 2024-03-12 DIAGNOSIS — Z01.419 ENCOUNTER FOR GYNECOLOGICAL EXAMINATION (GENERAL) (ROUTINE) W/OUT ABNORMAL FINDINGS: ICD-10-CM

## 2024-03-12 DIAGNOSIS — J45.909 UNSPECIFIED ASTHMA, UNCOMPLICATED: ICD-10-CM

## 2024-03-12 DIAGNOSIS — Z00.00 ENCOUNTER FOR GENERAL ADULT MEDICAL EXAMINATION W/OUT ABNORMAL FINDINGS: ICD-10-CM

## 2024-03-12 DIAGNOSIS — F19.10 OTHER PSYCHOACTIVE SUBSTANCE ABUSE, UNCOMPLICATED: ICD-10-CM

## 2024-03-12 DIAGNOSIS — Z23 ENCOUNTER FOR IMMUNIZATION: ICD-10-CM

## 2024-03-12 DIAGNOSIS — D72.820 LYMPHOCYTOSIS (SYMPTOMATIC): ICD-10-CM

## 2024-03-12 PROCEDURE — 99204 OFFICE O/P NEW MOD 45 MIN: CPT

## 2024-03-12 RX ORDER — IBUPROFEN 800 MG/1
800 TABLET, FILM COATED ORAL
Qty: 120 | Refills: 0 | Status: DISCONTINUED | COMMUNITY
Start: 2023-06-19 | End: 2024-03-12

## 2024-03-12 NOTE — HISTORY OF PRESENT ILLNESS
[FreeTextEntry1] : CPE [de-identified] : Pleasant 38-year-old -American female with a history of chronic mild lymphocytosis, polysubstance abuse (cocaine marijuana alcohol), mild mixed hyperlipidemia, reactive airway disease prescribed Flovent/prednisone/albuterol with respiratory tract infections who comes in as a new patient to our practice requesting full physical and labs.  Patient was in the ED 4 times in the past 4 months, epigastric pain with small hiatal hernia on CT unremarkable labs, COVID, and most recently for subjective tachycardia in the setting of cocaine alcohol marijuana use on March 10 with pulse 108 in the ED, sinus rhythm on EKG.  Received IV fluids Ativan felt better and dismissed home.  CBC and CMP unremarkable other than patient has chronic mild lymphocytosis dating back to 2013.

## 2024-03-12 NOTE — ASSESSMENT
[FreeTextEntry1] : *Chronic lymphocytosis. ALC 2.9-4.1 since , for which [facility] Flow cytometry ordered.  *Polysubstance abuse.  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  Denies IV drug abuse.  HCV and HIV nonreactive . UDS neg .  *Internal hemorrhoids.  Lower GI bleed prompted colonoscopy 2013 moderate internal hemorrhoids but no other pathology identified at that time.  *Left knee pain. S/P R ACL recon  after  rupture. Valgus deformity, BMI 32. Plans to f/u w her orthopedist.  *Stated history recurrent H pylori infections. Rpts eradication attempts x2, recent EGD by Dr Bolton pathology results pending. Requested both be faxed to us.  *Neurodermatitis. Thickening interscapular skin where chronically scratches. Doing a good job with moisturization based on exam today.   *Orthopnea. No findings for CHF on exam, pt overweight. R/O orthodeoxia on rtn.  *Routine adult health maintenance Vaccinations: Tdap today, declines flu COVID Colon cancer screening: Moderate internal hemorrhoids on diagnostic colonoscopy 2013.  No family history colorectal cancer.  Next colonoscopy age 44. Breast cancer screening: No family history of breast cancer. Tuberculosis H CV HIV nonreactive [id number]. Screens for diabetes thyroid disease dyslipidemia elevated LP(a) vit D deficiency organized.  UTD CMP.  Pleasure to visit with Ms Hale today. Answered all questions as best I could. Disp: Rpt visit, o2 check Time 50 min

## 2024-03-12 NOTE — HEALTH RISK ASSESSMENT
[Very Good] : ~his/her~  mood as very good [1 or 2 (0 pts)] : 1 or 2 (0 points) [2 - 3 times a week (3 pts)] : 2 - 3  times a week (3 points) [Never (0 pts)] : Never (0 points) [Yes] : In the past 12 months have you used drugs other than those required for medical reasons? Yes [No falls in past year] : Patient reported no falls in the past year [0] : 2) Feeling down, depressed, or hopeless: Not at all (0) [PHQ-2 Negative - No further assessment needed] : PHQ-2 Negative - No further assessment needed [Audit-CScore] : 3 [de-identified] : At ED visit 3/10/24, rpted cocaine, MJ, EtOH concurrently. [de-identified] : sedentary [de-identified] : fair [[id number]] : 0 [[facility]] : 10 [Patient declined mammogram] : Patient declined mammogram [Patient reported PAP Smear was normal] : Patient reported PAP Smear was normal [Patient declined bone density test] : Patient declined bone density test [HIV test declined] : HIV test declined [Patient reported colonoscopy was normal] : Patient reported colonoscopy was normal [Change in mental status noted] : No change in mental status noted [Hepatitis C test offered] : Hepatitis C test offered [Behavior] : denies difficulty with behavior [Language] : denies difficulty with language [Learning/Retaining New Information] : denies difficulty learning/retaining new information [Handling Complex Tasks] : denies difficulty handling complex tasks [None] : None [Spatial Ability and Orientation] : denies difficulty with spatial ability and orientation [Reasoning] : denies difficulty with reasoning [# of Members in Household ___] :  household currently consist of [unfilled] member(s) [With Family] : lives with family [High School] : high school [Single] : single [# Of Children ___] : has [unfilled] children [High Risk Behavior] : no high risk behavior [Sexually Active] : sexually active [Feels Safe at Home] : Feels safe at home [Fully functional (using the telephone, shopping, preparing meals, housekeeping, doing laundry, using] : Fully functional and needs no help or supervision to perform IADLs (using the telephone, shopping, preparing meals, housekeeping, doing laundry, using transportation, managing medications and managing finances) [Fully functional (bathing, dressing, toileting, transferring, walking, feeding)] : Fully functional (bathing, dressing, toileting, transferring, walking, feeding) [Reports changes in vision] : Reports no changes in vision [Reports changes in hearing] : Reports no changes in hearing [Smoke Detector] : smoke detector [Reports changes in dental health] : Reports no changes in dental health [Carbon Monoxide Detector] : carbon monoxide detector [Guns at Home] : no guns at home [Safety elements used in home] : safety elements used in home [Seat Belt] :  uses seat belt [Sunscreen] : does not use sunscreen [TB Exposure] : is not being exposed to tuberculosis [Travel to Developing Areas] : does not  travel to developing areas [Caregiver Concerns] : does not have caregiver concerns [HIVDate] : 11/21 [ColonoscopyDate] : 04/13 [HepatitisCDate] : 11/21 [HIVComments] : neg [de-identified] : 3 sons [HepatitisCComments] : neg [Current] : Current [15-19] : 15-19

## 2024-03-12 NOTE — PHYSICAL EXAM
[Normal Oropharynx] : the oropharynx was normal [Normal TMs] : both tympanic membranes were normal [Declined Rectal Exam] : declined rectal exam [No CVA Tenderness] : no CVA  tenderness [Kyphosis] : kyphosis [Scoliosis] : no scoliosis [Coordination Grossly Intact] : coordination grossly intact [Normal Gait] : normal gait [No Focal Deficits] : no focal deficits [Normal] : affect was normal and insight and judgment were intact [Deep Tendon Reflexes (DTR)] : deep tendon reflexes were 2+ and symmetric [de-identified] : moderate wax on left, didn't tolerate pick. Mild coryza. [de-identified] : mildly decreased air entry without crackles or wheeze. [de-identified] : mild obesity [de-identified] : defer to Gyne [de-identified] : Genu valgus with medial joint line tenderness. [de-identified] : Monofilament and joint proprioception preserved B MARIA R.  [de-identified] : LSC superior back

## 2024-03-19 ENCOUNTER — APPOINTMENT (OUTPATIENT)
Dept: INTERNAL MEDICINE | Facility: CLINIC | Age: 39
End: 2024-03-19

## 2024-03-19 LAB
25(OH)D3 SERPL-MCNC: 12.4 NG/ML
APO LP(A) SERPL-MCNC: 56.3 NMOL/L
APPEARANCE: CLEAR
BASOPHILS # BLD AUTO: 0.05 K/UL
BASOPHILS NFR BLD AUTO: 0.7 %
BILIRUBIN URINE: NEGATIVE
BLOOD URINE: NEGATIVE
CHOLEST SERPL-MCNC: 238 MG/DL
COLOR: YELLOW
EOSINOPHIL # BLD AUTO: 0.29 K/UL
EOSINOPHIL NFR BLD AUTO: 4.2 %
ESTIMATED AVERAGE GLUCOSE: 103 MG/DL
GLUCOSE QUALITATIVE U: NEGATIVE MG/DL
HBA1C MFR BLD HPLC: 5.2 %
HCT VFR BLD CALC: 36.3 %
HDLC SERPL-MCNC: 65 MG/DL
HGB BLD-MCNC: 12.1 G/DL
IMM GRANULOCYTES NFR BLD AUTO: 0.3 %
KETONES URINE: NEGATIVE MG/DL
LDLC SERPL CALC-MCNC: 145 MG/DL
LEUKOCYTE ESTERASE URINE: NEGATIVE
LYMPHOCYTES # BLD AUTO: 2.79 K/UL
LYMPHOCYTES NFR BLD AUTO: 40.3 %
MAN DIFF?: NORMAL
MCHC RBC-ENTMCNC: 30.1 PG
MCHC RBC-ENTMCNC: 33.3 GM/DL
MCV RBC AUTO: 90.3 FL
MONOCYTES # BLD AUTO: 0.62 K/UL
MONOCYTES NFR BLD AUTO: 8.9 %
NEUTROPHILS # BLD AUTO: 3.16 K/UL
NEUTROPHILS NFR BLD AUTO: 45.6 %
NITRITE URINE: NEGATIVE
NONHDLC SERPL-MCNC: 173 MG/DL
PH URINE: 6.5
PLATELET # BLD AUTO: 329 K/UL
PROTEIN URINE: NORMAL MG/DL
RBC # BLD: 4.02 M/UL
RBC # FLD: 12.5 %
SPECIFIC GRAVITY URINE: 1.02
TRIGL SERPL-MCNC: 159 MG/DL
TSH SERPL-ACNC: 0.81 UIU/ML
UROBILINOGEN URINE: 1 MG/DL
WBC # FLD AUTO: 6.93 K/UL

## 2024-03-20 ENCOUNTER — APPOINTMENT (OUTPATIENT)
Dept: INTERNAL MEDICINE | Facility: CLINIC | Age: 39
End: 2024-03-20

## 2024-03-20 RX ORDER — ERGOCALCIFEROL 1.25 MG/1
1.25 MG CAPSULE, LIQUID FILLED ORAL
Qty: 8 | Refills: 0 | Status: ACTIVE | COMMUNITY
Start: 2024-03-20 | End: 1900-01-01

## 2024-03-20 NOTE — HISTORY OF PRESENT ILLNESS
[FreeTextEntry1] : Patient unable to attend, not seen [de-identified] : Pleasant 38-year-old -American female with a history of chronic mild lymphocytosis, polysubstance abuse (cocaine marijuana alcohol), mild mixed hyperlipidemia, reactive airway disease prescribed Flovent/prednisone/albuterol with respiratory tract infections who at patient request for full physical and labs notable for vitamin D 12,  and high normal ALC.  History of visits to the ED 4 times in the past 4 months, epigastric pain with small hiatal hernia on CT unremarkable labs, COVID, and most recently for subjective tachycardia in the setting of cocaine alcohol marijuana use on March 10 with pulse 108 in the ED, sinus rhythm on EKG.  Received IV fluids Ativan felt better and dismissed home.  Unfortunately information she provided for her gastroenterologist proved inaccurate, and we are uncertain regarding appropriate management strategy for patient reported recurrent H. pylori infection.

## 2024-03-20 NOTE — HISTORY OF PRESENT ILLNESS
[FreeTextEntry1] : Pt unable to attend on time, not seen  [de-identified] : Pleasant 38-year-old -American female with a history of chronic mild lymphocytosis, polysubstance abuse (cocaine marijuana alcohol), mild mixed hyperlipidemia, reactive airway disease prescribed Flovent/prednisone/albuterol with respiratory tract infections who recently came in as a new patient to our practice requesting full physical and labs.                           History of visits to the ED 4 times in the past 4 months, epigastric pain with small hiatal hernia on CT unremarkable labs, COVID, and most recently for subjective tachycardia in the setting of cocaine alcohol marijuana use on March 10 with pulse 108 in the ED, sinus rhythm on EKG. Received IV fluids Ativan felt better and dismissed home.  We are awaiting records from her gastroenterologist regarding appropriate management strategy for patient reported recurrent H. pylori infection.  On initial visit, reported following symptoms: *itch, rash *Shortness of breath sinus congestion, *Chest pain palpitations *Headache dizziness weakness numbness *Orthopnea Heart Lung Neurologic exam unremarkable at that time, did not cough once during visit. Checking EKG orthodeoxia sats today....

## 2024-03-20 NOTE — ASSESSMENT
[FreeTextEntry1] : *Chronic mild lymphocytosis. ALC 2.9-4.1 since , for which FLC Flow cytometry ordered, ALC normal 2.8 currently, FLC  without monoclonality.  *Polysubstance abuse. 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 Denies IV drug abuse. HCV and HIV nonreactive . UDS neg .  *Internal hemorrhoids. Lower GI bleed prompted colonoscopy 2013 moderate internal hemorrhoids but no other pathology identified at that time. Colonoscopy age 44.   *Left knee pain. S/P R ACL recon  after  rupture. Valgus deformity, BMI 32. Plans to f/u w her orthopedist.  *Stated history recurrent H pylori infections. Rpts eradication attempts x2, recent EGD by Dr Bolton pathology results pending. Requested both be faxed to us. Saw GI in the [employer] system in  noting constipation GERD declining to take PPI at that time. Awaiting his records currently.  *Neurodermatitis. Thickening interscapular skin where chronically scratches. Doing a good job with moisturization based on exam today.  *Orthopnea. No findings for CHF on exam, pt overweight. Supine vs seated oxygen saturation  *Mild dyslipidemia.  LP(a) 56 HDL 65, which confers a 0.6% 10-year risk of M ACE given smoking history. Discussed Mediterranean diet.  *Vitamin D deficiency. Vitamin D low at 12 for which 50,000 IU weekly for 2 months dropping to 2000 IU daily advised.  *Routine adult health maintenance Vaccinations: [id number], declines flu COVID Colon cancer screening: Moderate internal hemorrhoids on diagnostic colonoscopy 2013. No family history colorectal cancer. Next colonoscopy age 44. Breast cancer screening: No family history of breast cancer. Every other year mammograms starting next year. Cervical cancer: Pap  neg for HPV. Offered referral to gyne. Tuberculosis H CV HIV nonreactive [id number]. Screens for anemia, diabetes thyroid disease elevated LP(a) UTI hematuria all normal/negative   Pt unable to attend on time, not seen

## 2024-03-20 NOTE — ASSESSMENT
[FreeTextEntry1] : *Chronic lymphocytosis. ALC 2.9-4.1 since , for which FLC Flow cytometry ordered, ALC normal 2.8 currently, FLC pending.  *Polysubstance abuse. 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 Denies IV drug abuse. HCV and HIV nonreactive . UDS neg .  *Internal hemorrhoids. Lower GI bleed prompted colonoscopy 2013 moderate internal hemorrhoids but no other pathology identified at that time.  *Left knee pain. S/P R ACL recon  after  rupture. Valgus deformity, BMI 32. Plans to f/u w her orthopedist.  *Stated history recurrent H pylori infections. Rpts eradication attempts x2, recent EGD by Dr Bolton pathology results pending. Requested both be faxed to us.  Saw GI in the [employer] system in  noting constipation GERD declining to take PPI at that time.  *Neurodermatitis. Thickening interscapular skin where chronically scratches. Doing a good job with moisturization based on exam today.  *Orthopnea. No findings for CHF on exam, pt overweight.  Supine oxygen saturation  *Mild dyslipidemia.   LP(a) 56 HDL 65, which confers a 0.6% 10-year risk of M ACE given smoking history.  Discussed Mediterranean diet.  *Vitamin D deficiency.  Vitamin D low at 12 for which 50,000 IU weekly for 2 months dropping to 2000 IU daily advised.  *Routine adult health maintenance Vaccinations: [id number], declines flu COVID Colon cancer screening: Moderate internal hemorrhoids on diagnostic colonoscopy 2013. No family history colorectal cancer. Next colonoscopy age 44. Breast cancer screening: No family history of breast cancer.  Every other year mammograms starting next year. Tuberculosis H CV HIV nonreactive [id number]. Screens for diabetes thyroid disease dyslipidemia elevated LP(a) vit D deficiency organized. UTD CMP.  Pleasure to visit with Ms Hale today. Answered all questions as best I could. Patient unable to attend, not seen.

## 2024-03-22 ENCOUNTER — APPOINTMENT (OUTPATIENT)
Dept: INTERNAL MEDICINE | Facility: CLINIC | Age: 39
End: 2024-03-22
Payer: MEDICAID

## 2024-03-22 VITALS
OXYGEN SATURATION: 98 % | DIASTOLIC BLOOD PRESSURE: 82 MMHG | BODY MASS INDEX: 32.94 KG/M2 | SYSTOLIC BLOOD PRESSURE: 122 MMHG | HEIGHT: 62 IN | RESPIRATION RATE: 16 BRPM | WEIGHT: 179 LBS | HEART RATE: 101 BPM | TEMPERATURE: 96.6 F

## 2024-03-22 DIAGNOSIS — K58.1 IRRITABLE BOWEL SYNDROME WITH CONSTIPATION: ICD-10-CM

## 2024-03-22 DIAGNOSIS — E55.9 VITAMIN D DEFICIENCY, UNSPECIFIED: ICD-10-CM

## 2024-03-22 PROCEDURE — 99213 OFFICE O/P EST LOW 20 MIN: CPT

## 2024-03-22 RX ORDER — SERTRALINE 25 MG/1
25 TABLET, FILM COATED ORAL DAILY
Qty: 30 | Refills: 0 | Status: DISCONTINUED | COMMUNITY
Start: 2022-01-19 | End: 2024-03-22

## 2024-03-22 RX ORDER — BENZONATATE 100 MG/1
100 CAPSULE ORAL 3 TIMES DAILY
Qty: 21 | Refills: 0 | Status: DISCONTINUED | COMMUNITY
Start: 2023-03-16 | End: 2024-03-22

## 2024-03-22 RX ORDER — SODIUM CHLORIDE 0.65 %
0.65 AEROSOL, SPRAY (ML) NASAL TWICE DAILY
Qty: 1 | Refills: 0 | Status: DISCONTINUED | COMMUNITY
Start: 2023-03-16 | End: 2024-03-22

## 2024-03-22 RX ORDER — MECLIZINE HYDROCHLORIDE 25 MG/1
25 TABLET ORAL 3 TIMES DAILY
Qty: 15 | Refills: 0 | Status: DISCONTINUED | COMMUNITY
Start: 2023-03-16 | End: 2024-03-22

## 2024-03-22 RX ORDER — IBUPROFEN 800 MG/1
800 TABLET, FILM COATED ORAL
Qty: 120 | Refills: 0 | Status: DISCONTINUED | COMMUNITY
Start: 2023-12-11 | End: 2024-03-22

## 2024-03-22 RX ORDER — FLUTICASONE PROPIONATE 50 UG/1
50 SPRAY, METERED NASAL DAILY
Qty: 1 | Refills: 0 | Status: DISCONTINUED | COMMUNITY
Start: 2023-09-22 | End: 2024-03-22

## 2024-03-22 RX ORDER — ACETAMINOPHEN 650 MG/1
650 TABLET, EXTENDED RELEASE ORAL
Qty: 12 | Refills: 0 | Status: DISCONTINUED | COMMUNITY
Start: 2023-09-22 | End: 2024-03-22

## 2024-03-22 RX ORDER — HYOSCYAMINE SULFATE 0.12 MG/1
0.12 TABLET ORAL 4 TIMES DAILY
Qty: 20 | Refills: 0 | Status: ACTIVE | COMMUNITY
Start: 2024-03-22 | End: 1900-01-01

## 2024-03-22 RX ORDER — NAPROXEN 500 MG/1
500 TABLET ORAL
Qty: 60 | Refills: 3 | Status: DISCONTINUED | COMMUNITY
Start: 2022-11-29 | End: 2024-03-22

## 2024-03-22 RX ORDER — DICLOFENAC SODIUM 75 MG/1
75 TABLET, DELAYED RELEASE ORAL
Qty: 60 | Refills: 3 | Status: DISCONTINUED | COMMUNITY
Start: 2022-11-10 | End: 2024-03-22

## 2024-03-22 NOTE — HISTORY OF PRESENT ILLNESS
[FreeTextEntry1] : Report visit.  Abdominal pain. [de-identified] : Pleasant 38-year-old -American female with a history of chronic mild lymphocytosis, polysubstance abuse (cocaine marijuana alcohol), mild mixed hyperlipidemia, reactive airway disease prescribed Flovent/prednisone/albuterol with respiratory tract infections who recently came in as a new patient to our practice requesting full physical and labs.  History of visits to the ED 4 times in the past 4 months, epigastric pain with small hiatal hernia on CT unremarkable labs, COVID, and most recently for subjective tachycardia in the setting of cocaine alcohol marijuana use on March 10 with pulse 108 in the ED, sinus rhythm on EKG. Received IV fluids Ativan felt better and dismissed home.  We are awaiting records from her gastroenterologist regarding appropriate management strategy for patient reported recurrent H. pylori infection.  On initial visit, reported following symptoms: *itch, rash *Shortness of breath sinus congestion, *Chest pain palpitations.  Today she explains that she has right shoulder pain worsened with right upper extremity abduction and external rotation stretching the pectoralis muscle. *Headache dizziness weakness numbness *Orthopnea Heart Lung Neurologic exam unremarkable at that time, did not cough once during visit.  O2 sat sitting 99% supine 97-98%. *Bloating, abdominal pain.  Today she is having abdominal pain last bowel movement yesterday straining dry hard.  She believes she is hydrating well but appears to have stopped taking fiber recently.  Passing gas from below.

## 2024-03-22 NOTE — PHYSICAL EXAM
[Soft] : abdomen soft [Non-distended] : non-distended [No Masses] : no abdominal mass palpated [Normal] : affect was normal and insight and judgment were intact [Normal Bowel Sounds] : normal bowel sounds [de-identified] : Tender right lower quadrant but less tenderness in the epigastrium as well. No guarding. [de-identified] : Right chest wall tenderness without underlying pleuritic friction rub or obvious rash.  No rib step-offs.

## 2024-03-22 NOTE — ASSESSMENT
[FreeTextEntry1] : *Chronic mild lymphocytosis. ALC 2.9-4.1 since , for which FLC Flow cytometry ordered, ALC normal 2.8 currently, FLC without monoclonality.  *Polysubstance abuse. 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 Denies IV drug abuse. HCV and HIV nonreactive . UDS neg .  *Internal hemorrhoids. Lower GI bleed prompted colonoscopy 2013 moderate internal hemorrhoids but no other pathology identified at that time. Colonoscopy age 44.  *Left knee pain. S/P R ACL recon  after  rupture. Valgus deformity, BMI 32. Plans to f/u w her orthopedist.  *Stated history recurrent H pylori infections. Rpts eradication attempts x2, recent EGD by Dr Bolton pathology results pending. Requested both be faxed to us. Saw GI in the [employer] system in  noting constipation GERD declining to take PPI at that time. Awaiting his records currently.  *Neurodermatitis. Thickening interscapular skin where chronically scratches. Doing a good job with moisturization based on exam today.  *Orthopnea. No findings for CHF on exam, pt overweight. Supine vs seated oxygen saturation normal.  Patient reassured.  *Mild dyslipidemia.  LP(a) 56 HDL 65, which confers a 0.6% 10-year risk of M ACE given smoking history. Discussed Mediterranean diet.  *Vitamin D deficiency. Vitamin D low at 12 for which 50,000 IU weekly for 2 months dropping to 2000 IU daily advised.  * Chest wall strain, possible chondral chondritis.  No rash step-offs or pleural friction rub.  Point tenderness.  Patient reassured.  As needed NSAIDs, heat.  *Constipation, irritable bowel.  Reviewed importance of Benefiber adequate hydration low FODMAP diet probiotics.  Levsin to be taken as needed bowel spasm.  *Routine adult health maintenance Vaccinations: [id number], declines flu COVID Colon cancer screening: Moderate internal hemorrhoids on diagnostic colonoscopy 2013. No family history colorectal cancer. Next colonoscopy age 44. Breast cancer screening: No family history of breast cancer. Every other year mammograms starting next year. Cervical cancer: Pap  neg for HPV. Offered referral to gyne. Tuberculosis H CV HIV nonreactive [id number]. Screens for anemia, diabetes thyroid disease elevated LP(a) UTI hematuria all normal/negative   Disposition: await GI records re H pylori, suspect functional dyspepsis. Time 25 min

## 2024-03-26 ENCOUNTER — APPOINTMENT (OUTPATIENT)
Dept: GASTROENTEROLOGY | Facility: CLINIC | Age: 39
End: 2024-03-26

## 2024-03-31 ENCOUNTER — NON-APPOINTMENT (OUTPATIENT)
Age: 39
End: 2024-03-31

## 2024-04-29 ENCOUNTER — APPOINTMENT (OUTPATIENT)
Dept: INTERNAL MEDICINE | Facility: CLINIC | Age: 39
End: 2024-04-29
Payer: MEDICAID

## 2024-04-29 VITALS
HEART RATE: 107 BPM | DIASTOLIC BLOOD PRESSURE: 72 MMHG | RESPIRATION RATE: 16 BRPM | OXYGEN SATURATION: 97 % | SYSTOLIC BLOOD PRESSURE: 118 MMHG | TEMPERATURE: 97.3 F

## 2024-04-29 DIAGNOSIS — J06.9 ACUTE UPPER RESPIRATORY INFECTION, UNSPECIFIED: ICD-10-CM

## 2024-04-29 DIAGNOSIS — A04.8 OTHER SPECIFIED BACTERIAL INTESTINAL INFECTIONS: ICD-10-CM

## 2024-04-29 PROCEDURE — 99213 OFFICE O/P EST LOW 20 MIN: CPT

## 2024-04-29 RX ORDER — OMEPRAZOLE 20 MG/1
20 CAPSULE, DELAYED RELEASE ORAL
Qty: 20 | Refills: 0 | Status: ACTIVE | COMMUNITY
Start: 2024-04-29 | End: 1900-01-01

## 2024-04-29 NOTE — ASSESSMENT
[FreeTextEntry1] : *Viral URTI.  Day 3 of illness.  Very mild by exam, clear rhinorrhea only.  Discussed use of multisymptom cold reliever sinus irrigation.  Suggested she mask and isolate at home for a couple more days.  Swab would not  at this point.  *Chronic mild lymphocytosis. ALC 2.9-4.1 since , for which FLC Flow cytometry ordered, ALC normal 2.8 currently, FLC without monoclonality.  *Polysubstance abuse. 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 Denies IV drug abuse. HCV and HIV nonreactive . UDS neg .  *Internal hemorrhoids. Lower GI bleed prompted colonoscopy 2013 moderate internal hemorrhoids but no other pathology identified at that time. Colonoscopy age 44.  *Left knee pain. S/P R ACL recon  after  rupture. Valgus deformity, BMI 32. Plans to f/u w her orthopedist.  *Stated history recurrent H pylori infections. Rpts eradication attempts x2 most recently  from Dr Teague, recent EGD 3/6/2024 by Dr Moran showed chronic active h pylori gastritis, as well as suggestion of eosinophilic esophagitis grossly fortunately no eosinophilic infiltrate or increased lymphocytosis appreciated on biopsy.  I called [facility] at [address] [place] [address] who indicate that they never received an H. pylori eradication regimen last month.  Presumably previously treated with Biaxin/amoxicillin or Biaxin/Flagyl triple therapy, will try bismuth quadruple therapy (bismuth subcitrate metronidazole and twice daily PPI omeprazole 20 mg twice daily) for 10 days (with this regimen, no added benefit to additional 4 days). Pylera 3 tabs qid x 10 day + Prilo 20mg bid x 10 rx's sent, pt informed.  *Neurodermatitis. Thickening interscapular skin where chronically scratches. Doing a good job with moisturization based on prior exam.  *Orthopnea. No findings for CHF on exam, pt overweight. Supine vs seated oxygen saturation normal. Patient reassured.  *Mild dyslipidemia.  LP(a) 56 HDL 65, which confers a 0.6% 10-year risk of M ACE given smoking history. Discussed Mediterranean diet.  *Vitamin D deficiency. Vitamin D low at 12 for which 50,000 IU weekly for 2 months dropping to 2000 IU daily advised.  * Chest wall strain, possible chondral chondritis. No rash step-offs or pleural friction rub. Point tenderness. Patient reassured. As needed NSAIDs, heat.  *Constipation, irritable bowel. Reviewed importance of Benefiber adequate hydration low FODMAP diet probiotics. Levsin to be taken as needed bowel spasm.  *Anxiety.  Patient reports she gets panicky at night, while laying in bed.  Used to see a therapist but stopped a couple years ago.  Not clearly throughout the day, therefore utility of medication such as Lexapro effects or unclear.  Polysubstance abuse history suggests benzodiazepine and not a good treatment option.  Perhaps BuSpar would be.  However we will see if patient can engage in CBT, counseling resources provided.  *Routine adult health maintenance Vaccinations: [id number], declines flu COVID Colon cancer screening: Moderate internal hemorrhoids on diagnostic colonoscopy 2013. No family history colorectal cancer. Next colonoscopy age 44. Breast cancer screening: No family history of breast cancer. Every other year mammograms starting next year. Cervical cancer: Pap  neg for HPV. Offered referral to gyne. Tuberculosis H CV HIV nonreactive [id number]. Screens for anemia, diabetes thyroid disease elevated LP(a) UTI hematuria all normal/negative   Disposition: await GI records re H pylori, may have functional dyspepsia. Time 25 min.

## 2024-04-29 NOTE — PHYSICAL EXAM
[Normal] : affect was normal and insight and judgment were intact [de-identified] : Left TM slightly reddened, no effusion. Clear rhinorrhea, bluish turbinates, minimal cobblestoning.

## 2024-04-29 NOTE — HISTORY OF PRESENT ILLNESS
[FreeTextEntry1] : Sore throat [de-identified] : Pleasant 38-year-old -American female with a history of chronic mild lymphocytosis, polysubstance abuse (cocaine marijuana alcohol), mild mixed hyperlipidemia, reactive airway disease prescribed Flovent/prednisone/albuterol with respiratory tract infections who comes in today for 3-day history of resolving sore throat, minimally productive cough without wheeze, denies fevers myalgias lymphadenopathy diarrhea new muscle aches.  We have yet to receive records from her gastroenterologist regarding past treatments of her H. pylori.  She missed her appointment with Dr. Davis/GI last month.  She has not rescheduled that appointment.  History of visits to the ED 4 times in the past 4 months, epigastric pain with small hiatal hernia on CT unremarkable labs, COVID, and most recently for subjective tachycardia in the setting of cocaine alcohol marijuana use on March 10 with pulse 108 in the ED, sinus rhythm on EKG. Received IV fluids Ativan felt better and dismissed home.  We are awaiting records from her gastroenterologist regarding appropriate management strategy for patient reported recurrent H. pylori infection.  Patient believes that they have been forwarded to Dr. Davis, I do not see anything scanned in her chart.  Patient reports being anxious at night.  Has not been able to identify a telehealth counselor.

## 2024-05-14 RX ORDER — BISMUTH SUBSALICYLATE 262 MG/1
262 TABLET, CHEWABLE ORAL 4 TIMES DAILY
Qty: 108 | Refills: 0 | Status: ACTIVE | COMMUNITY
Start: 2024-05-14 | End: 1900-01-01

## 2024-05-14 RX ORDER — BISMUTH SUBCITRATE POTASSIUM, METRONIDAZOLE, AND TETRACYCLINE HYDROCHLORIDE 140; 125; 125 MG/1; MG/1; MG/1
140-125-125 CAPSULE ORAL
Qty: 120 | Refills: 0 | Status: DISCONTINUED | COMMUNITY
Start: 2024-04-29 | End: 2024-05-14

## 2024-05-14 RX ORDER — METRONIDAZOLE 250 MG/1
250 TABLET ORAL
Qty: 54 | Refills: 0 | Status: ACTIVE | COMMUNITY
Start: 2024-05-14 | End: 1900-01-01

## 2024-05-14 RX ORDER — TETRACYCLINE HYDROCHLORIDE 500 MG/1
500 TABLET, FILM COATED ORAL
Qty: 54 | Refills: 0 | Status: ACTIVE | COMMUNITY
Start: 2024-05-14 | End: 1900-01-01

## 2024-05-20 ENCOUNTER — APPOINTMENT (OUTPATIENT)
Dept: GASTROENTEROLOGY | Facility: CLINIC | Age: 39
End: 2024-05-20

## 2024-06-11 ENCOUNTER — APPOINTMENT (OUTPATIENT)
Dept: OBGYN | Facility: CLINIC | Age: 39
End: 2024-06-11
Payer: MEDICAID

## 2024-06-11 VITALS
SYSTOLIC BLOOD PRESSURE: 112 MMHG | WEIGHT: 164 LBS | BODY MASS INDEX: 30.18 KG/M2 | HEIGHT: 62 IN | OXYGEN SATURATION: 98 % | TEMPERATURE: 97.2 F | HEART RATE: 85 BPM | DIASTOLIC BLOOD PRESSURE: 72 MMHG

## 2024-06-11 DIAGNOSIS — D21.9 BENIGN NEOPLASM OF CONNECTIVE AND OTHER SOFT TISSUE, UNSPECIFIED: ICD-10-CM

## 2024-06-11 LAB
HCG UR QL: NEGATIVE
QUALITY CONTROL: YES

## 2024-06-11 PROCEDURE — 99202 OFFICE O/P NEW SF 15 MIN: CPT

## 2024-06-11 NOTE — HISTORY OF PRESENT ILLNESS
[FreeTextEntry1] : Pt is a 38-year-old who presents today second opinion on her CT scan. Pt feels pelvic pressure and had surgeries planned with prior GYN which got cancelled multiple times. Pt's last annual was 2024.  CT abdomen 2024: Myomatous uterus with possible degenerating fibroid measuring up to 8.1 x 7.5 x 6.1 cm.  Pt is not sexually active at this time.  LMP: 6/3/2024 POB:  x 1  PGYN: Regular, denies abl pap  PMH: denies PSH: denies  Med: per MAR All: NKDA- Latex- hives  SH: social ETOH, former smoker  FH: Maternal grandmother with ovarian cancer, Maternal aunt with GI cancer, Maternal uncle with prostate cancer   Mammo: DUE in December

## 2024-06-11 NOTE — PLAN
[FreeTextEntry1] : MRI pelvic ordered  Records from prior GYN to be faxed  Pt has an appointment with Dr. Noel on 6/19 at 12 pm for surgical consult

## 2024-06-11 NOTE — DISCUSSION/SUMMARY
[FreeTextEntry1] : I spent the time noted on the day of this patient encounter preparing for, providing and documenting the above service. I have  counseled and educated the patient on the differential, workup, disease course, and treatment/management plan. Education was provided to the patient during this encounter. All questions and concerns were answered and addressed in detail.  Anais Mcintosh NP, FNP-BC

## 2024-06-13 ENCOUNTER — NON-APPOINTMENT (OUTPATIENT)
Age: 39
End: 2024-06-13

## 2024-06-14 ENCOUNTER — APPOINTMENT (OUTPATIENT)
Dept: MRI IMAGING | Facility: HOSPITAL | Age: 39
End: 2024-06-14
Payer: MEDICAID

## 2024-06-14 ENCOUNTER — EMERGENCY (EMERGENCY)
Facility: HOSPITAL | Age: 39
LOS: 1 days | Discharge: HOME | End: 2024-06-14
Attending: EMERGENCY MEDICINE
Payer: MEDICAID

## 2024-06-14 ENCOUNTER — OUTPATIENT (OUTPATIENT)
Dept: OUTPATIENT SERVICES | Facility: HOSPITAL | Age: 39
LOS: 1 days | End: 2024-06-14
Payer: MEDICAID

## 2024-06-14 VITALS
HEART RATE: 66 BPM | OXYGEN SATURATION: 100 % | TEMPERATURE: 98 F | RESPIRATION RATE: 16 BRPM | SYSTOLIC BLOOD PRESSURE: 130 MMHG | DIASTOLIC BLOOD PRESSURE: 88 MMHG

## 2024-06-14 VITALS
RESPIRATION RATE: 20 BRPM | DIASTOLIC BLOOD PRESSURE: 69 MMHG | SYSTOLIC BLOOD PRESSURE: 109 MMHG | HEART RATE: 90 BPM | TEMPERATURE: 98 F | WEIGHT: 169.98 LBS | HEIGHT: 62 IN | OXYGEN SATURATION: 95 %

## 2024-06-14 DIAGNOSIS — N75.0 CYST OF BARTHOLIN'S GLAND: Chronic | ICD-10-CM

## 2024-06-14 DIAGNOSIS — D21.9 BENIGN NEOPLASM OF CONNECTIVE AND OTHER SOFT TISSUE, UNSPECIFIED: ICD-10-CM

## 2024-06-14 LAB
ALBUMIN SERPL ELPH-MCNC: 4 G/DL — SIGNIFICANT CHANGE UP (ref 3.3–5)
ALP SERPL-CCNC: 67 U/L — SIGNIFICANT CHANGE UP (ref 40–120)
ALT FLD-CCNC: 24 U/L — SIGNIFICANT CHANGE UP (ref 10–45)
ANION GAP SERPL CALC-SCNC: 11 MMOL/L — SIGNIFICANT CHANGE UP (ref 5–17)
APPEARANCE UR: CLEAR — SIGNIFICANT CHANGE UP
AST SERPL-CCNC: 27 U/L — SIGNIFICANT CHANGE UP (ref 10–40)
BACTERIA # UR AUTO: NEGATIVE /HPF — SIGNIFICANT CHANGE UP
BASOPHILS # BLD AUTO: 0.04 K/UL — SIGNIFICANT CHANGE UP (ref 0–0.2)
BASOPHILS NFR BLD AUTO: 0.6 % — SIGNIFICANT CHANGE UP (ref 0–2)
BILIRUB SERPL-MCNC: 0.2 MG/DL — SIGNIFICANT CHANGE UP (ref 0.2–1.2)
BILIRUB UR-MCNC: NEGATIVE — SIGNIFICANT CHANGE UP
BUN SERPL-MCNC: 16 MG/DL — SIGNIFICANT CHANGE UP (ref 7–23)
CALCIUM SERPL-MCNC: 9.2 MG/DL — SIGNIFICANT CHANGE UP (ref 8.4–10.5)
CAST: 0 /LPF — SIGNIFICANT CHANGE UP (ref 0–4)
CHLORIDE SERPL-SCNC: 107 MMOL/L — SIGNIFICANT CHANGE UP (ref 96–108)
CO2 SERPL-SCNC: 23 MMOL/L — SIGNIFICANT CHANGE UP (ref 22–31)
COLOR SPEC: YELLOW — SIGNIFICANT CHANGE UP
CREAT SERPL-MCNC: 0.87 MG/DL — SIGNIFICANT CHANGE UP (ref 0.5–1.3)
DIFF PNL FLD: NEGATIVE — SIGNIFICANT CHANGE UP
EGFR: 87 ML/MIN/1.73M2 — SIGNIFICANT CHANGE UP
EOSINOPHIL # BLD AUTO: 0.21 K/UL — SIGNIFICANT CHANGE UP (ref 0–0.5)
EOSINOPHIL NFR BLD AUTO: 2.9 % — SIGNIFICANT CHANGE UP (ref 0–6)
GLUCOSE SERPL-MCNC: 99 MG/DL — SIGNIFICANT CHANGE UP (ref 70–99)
GLUCOSE UR QL: NEGATIVE MG/DL — SIGNIFICANT CHANGE UP
HCG SERPL-ACNC: <2 MIU/ML — SIGNIFICANT CHANGE UP
HCT VFR BLD CALC: 34.4 % — LOW (ref 34.5–45)
HGB BLD-MCNC: 11.2 G/DL — LOW (ref 11.5–15.5)
IMM GRANULOCYTES NFR BLD AUTO: 0.3 % — SIGNIFICANT CHANGE UP (ref 0–0.9)
KETONES UR-MCNC: NEGATIVE MG/DL — SIGNIFICANT CHANGE UP
LEUKOCYTE ESTERASE UR-ACNC: NEGATIVE — SIGNIFICANT CHANGE UP
LIDOCAIN IGE QN: 34 U/L — SIGNIFICANT CHANGE UP (ref 7–60)
LYMPHOCYTES # BLD AUTO: 3.03 K/UL — SIGNIFICANT CHANGE UP (ref 1–3.3)
LYMPHOCYTES # BLD AUTO: 41.7 % — SIGNIFICANT CHANGE UP (ref 13–44)
MCHC RBC-ENTMCNC: 29.6 PG — SIGNIFICANT CHANGE UP (ref 27–34)
MCHC RBC-ENTMCNC: 32.6 GM/DL — SIGNIFICANT CHANGE UP (ref 32–36)
MCV RBC AUTO: 90.8 FL — SIGNIFICANT CHANGE UP (ref 80–100)
MONOCYTES # BLD AUTO: 0.74 K/UL — SIGNIFICANT CHANGE UP (ref 0–0.9)
MONOCYTES NFR BLD AUTO: 10.2 % — SIGNIFICANT CHANGE UP (ref 2–14)
NEUTROPHILS # BLD AUTO: 3.23 K/UL — SIGNIFICANT CHANGE UP (ref 1.8–7.4)
NEUTROPHILS NFR BLD AUTO: 44.3 % — SIGNIFICANT CHANGE UP (ref 43–77)
NITRITE UR-MCNC: NEGATIVE — SIGNIFICANT CHANGE UP
NRBC # BLD: 0 /100 WBCS — SIGNIFICANT CHANGE UP (ref 0–0)
PH UR: 6 — SIGNIFICANT CHANGE UP (ref 5–8)
PLATELET # BLD AUTO: 301 K/UL — SIGNIFICANT CHANGE UP (ref 150–400)
POTASSIUM SERPL-MCNC: 3.5 MMOL/L — SIGNIFICANT CHANGE UP (ref 3.5–5.3)
POTASSIUM SERPL-SCNC: 3.5 MMOL/L — SIGNIFICANT CHANGE UP (ref 3.5–5.3)
PROT SERPL-MCNC: 6.8 G/DL — SIGNIFICANT CHANGE UP (ref 6–8.3)
PROT UR-MCNC: NEGATIVE MG/DL — SIGNIFICANT CHANGE UP
RBC # BLD: 3.79 M/UL — LOW (ref 3.8–5.2)
RBC # FLD: 13 % — SIGNIFICANT CHANGE UP (ref 10.3–14.5)
RBC CASTS # UR COMP ASSIST: 1 /HPF — SIGNIFICANT CHANGE UP (ref 0–4)
SODIUM SERPL-SCNC: 141 MMOL/L — SIGNIFICANT CHANGE UP (ref 135–145)
SP GR SPEC: >1.03 — HIGH (ref 1–1.03)
SQUAMOUS # UR AUTO: 4 /HPF — SIGNIFICANT CHANGE UP (ref 0–5)
UROBILINOGEN FLD QL: 0.2 MG/DL — SIGNIFICANT CHANGE UP (ref 0.2–1)
WBC # BLD: 7.27 K/UL — SIGNIFICANT CHANGE UP (ref 3.8–10.5)
WBC # FLD AUTO: 7.27 K/UL — SIGNIFICANT CHANGE UP (ref 3.8–10.5)
WBC UR QL: 2 /HPF — SIGNIFICANT CHANGE UP (ref 0–5)

## 2024-06-14 PROCEDURE — 96374 THER/PROPH/DIAG INJ IV PUSH: CPT

## 2024-06-14 PROCEDURE — 99285 EMERGENCY DEPT VISIT HI MDM: CPT | Mod: 25

## 2024-06-14 PROCEDURE — 36415 COLL VENOUS BLD VENIPUNCTURE: CPT

## 2024-06-14 PROCEDURE — 84702 CHORIONIC GONADOTROPIN TEST: CPT

## 2024-06-14 PROCEDURE — 80053 COMPREHEN METABOLIC PANEL: CPT

## 2024-06-14 PROCEDURE — A9579: CPT

## 2024-06-14 PROCEDURE — 87086 URINE CULTURE/COLONY COUNT: CPT

## 2024-06-14 PROCEDURE — 81001 URINALYSIS AUTO W/SCOPE: CPT

## 2024-06-14 PROCEDURE — 76856 US EXAM PELVIC COMPLETE: CPT | Mod: 26,59

## 2024-06-14 PROCEDURE — 85025 COMPLETE CBC W/AUTO DIFF WBC: CPT

## 2024-06-14 PROCEDURE — 83690 ASSAY OF LIPASE: CPT

## 2024-06-14 PROCEDURE — 76856 US EXAM PELVIC COMPLETE: CPT

## 2024-06-14 PROCEDURE — 72197 MRI PELVIS W/O & W/DYE: CPT | Mod: 26

## 2024-06-14 PROCEDURE — 76830 TRANSVAGINAL US NON-OB: CPT | Mod: 26

## 2024-06-14 PROCEDURE — 93975 VASCULAR STUDY: CPT

## 2024-06-14 PROCEDURE — 99285 EMERGENCY DEPT VISIT HI MDM: CPT

## 2024-06-14 PROCEDURE — 72197 MRI PELVIS W/O & W/DYE: CPT

## 2024-06-14 PROCEDURE — 93975 VASCULAR STUDY: CPT | Mod: 26

## 2024-06-14 PROCEDURE — 76830 TRANSVAGINAL US NON-OB: CPT

## 2024-06-14 RX ORDER — MORPHINE SULFATE 50 MG/1
4 CAPSULE, EXTENDED RELEASE ORAL ONCE
Refills: 0 | Status: DISCONTINUED | OUTPATIENT
Start: 2024-06-14 | End: 2024-06-14

## 2024-06-14 RX ORDER — ACETAMINOPHEN 500 MG
1000 TABLET ORAL ONCE
Refills: 0 | Status: COMPLETED | OUTPATIENT
Start: 2024-06-14 | End: 2024-06-14

## 2024-06-14 RX ADMIN — Medication 400 MILLIGRAM(S): at 17:51

## 2024-06-14 NOTE — ED PROVIDER NOTE - PHYSICAL EXAMINATION
Const: Awake, alert, no acute distress.  Well appearing.  Moving comfortably on stretcher.  HEENT: NC/AT.  Moist mucous membranes.  No pharyngeal erythema, no exudates.  Eyes: Extraocular movements intact b/l.  Pupils equal, round, and reactive to light b/l.  Conjunctiva pink.  No scleral icterus.  Neck: Neck supple, full ROM without pain.  Cardiac: Regular rate and regular rhythm. S1 S2 present.  Peripheral pulses 2+ and symmetric.  No LE edema.  No chest wall tenderness, no overlying skin changes.  Resp: Speaking in full sentences. No evidence of respiratory distress.  Good air entry b/l, breath sounds clear to auscultation b/l.  Abd: Non-distended, no overlying skin changes.  Soft, mild suprapubic tenderness, moderate RLQ tenderness,  no guarding, no rigidity, no rebound tenderness.  No palpable masses.  MSK: Spine midline and non-tender, no paraspinal tenderness, no palpable deformities or overlying skin changes or open wounds. No CVAT.  Skin: Normal coloration.  No rashes, abrasions or lacerations.  Neuro: Awake, alert & oriented x 3.  Moves all extremities spontaneously and symmetrically.  No focal deficits.

## 2024-06-14 NOTE — ED PROVIDER NOTE - NSFOLLOWUPINSTRUCTIONS_ED_ALL_ED_FT
PLEASE SEE ALL RESULTS OF BLOODWORK AND ULTRASOUND IMAGING BELOW.  Please review these results with your primary care physician to establish any follow ups as required.  Please follow up with your primary care physician within 1-3 days for continued care and evaluation.    You can take over the counter Tylenol up to 1000mg every 6 hours as needed for pain and/or over the counter Motrin up to 600mg every 6 hours as needed for pain, please follow the instructions on the bottle.    PLEASE CONTINUE TO FOLLOW UP WITH YOUR OBGYN DOCTOR NEXT WEEK FOR CONTINUED MANAGEMENT OF FIBROIDS.    Please RETURN TO THE EMERGENCY DEPARTMENT OR SEEK IMMEDIATE MEDICAL ATTENTION if you experience any new or worsening symptoms, including but not limited to: fevers, chills, persistent nausea or vomiting or diarrhea, significant fatigue, significantly decreased physical activity, inability to stand or walk on your own, inability to hold down foods or fluids because of nausea or vomiting, fainting, severe headaches, vision changes, chest pain, shortness of breath, bloody urine, coughing up or throwing up blood, bloody stools, incontinence of urine or stool.

## 2024-06-14 NOTE — ED PROVIDER NOTE - ATTENDING CONTRIBUTION TO CARE
PMD Anais Mcintosh  38-year-old female past ministry of asthma, depression, H. pylori, IBS, uterine fibroids status post D&C approximately year ago comes to ER complains of abdominal/pelvic pain.  Patient has pain for the past 4 months has been followed by GYN who is planning possible procedure on fibroid uterus.  Over the past 2 weeks pain worsened and worsened 2 days ago.  No fevers chills chest pain short of breath palpitations.  Patient had an outpatient MRI today preparation for the procedure and presented to GYN clinic as referred to the ER.  Pain is worse with movement palpation.  Physical exam well-developed well-nourished female awake alert mildly uncomfortable.  HEENT no cephalic atraumatic.  Chest clear A&P, no use of  muscles no respiratory distress.  No rales rhonchi's or wheezes.  Abdomen mild suprapubic tenderness no rebound guarding or masses.  No CVA tenderness.  Neuro no focal defects  All Colon MD, Facep

## 2024-06-14 NOTE — ED PROVIDER NOTE - PATIENT PORTAL LINK FT
You can access the FollowMyHealth Patient Portal offered by [facility] by registering at the following website: [email or web address]. By joining [employer]’s FollowMyHealth portal, you will also be able to view your health information using other applications (apps) compatible with our system.

## 2024-06-14 NOTE — ED ADULT TRIAGE NOTE - NSWEIGHTCALCTOOLDRUG_GEN_A_CORE
Head,  normocephalic,  atraumatic,  Face,  Face within normal limits,  Ears,  External ears within normal limits,  Nose/Nasopharynx,  External nose  normal appearance,  nares patent,  no nasal discharge,  Mouth and Throat,  Oral cavity appearance normal,  Breath odor normal,  Lips,  Appearance normal
 used

## 2024-06-14 NOTE — ED PROVIDER NOTE - OBJECTIVE STATEMENT
38-year-old F patient with history fibroids s/p D&C April 2024 presenting for evaluation of acutely worsening lower abdominal pain last night.  Patient describes proximal 1 year of chronic intermittent lower abdominal pain, 38-year-old F patient with history fibroids s/p D&C April 2024 presenting for evaluation of acutely worsening lower abdominal pain last night.  Patient describes approximately 1 year of chronic intermittent lower abdominal pain, worsening over the past 2 weeks, acutely worsening last night.  She describes intermittent severe 10/10 sharp pain to the right lower quadrant, radiating through to her back since last night, with associated nausea, lightheadedness.  She also notes around 2 weeks of dysuria and urinary frequency.  Patient was at MRI this morning for presurgical fibroid planning, had severe pain after the MRI and went up to her GYN doctor, and was told to come to the ED for evaluation.  Did not take any pain medication today.  LMP 6/3, typically regular.  Denies fevers, chills, chest pain, shortness of breath, headaches, numbness, tingling, vomiting, diarrhea, vaginal bleeding, hematuria, LOC.

## 2024-06-14 NOTE — ED ADULT NURSE NOTE - NSFALLUNIVINTERV_ED_ALL_ED
Bed/Stretcher in lowest position, wheels locked, appropriate side rails in place/Call bell, personal items and telephone in reach/Instruct patient to call for assistance before getting out of bed/chair/stretcher/Non-slip footwear applied when patient is off stretcher/[place] to call system/Physically safe environment - no spills, clutter or unnecessary equipment/Purposeful proactive rounding/Room/bathroom lighting operational, light cord in reach

## 2024-06-14 NOTE — CONSULT NOTE ADULT - ASSESSMENT
38y  LMP 6/3 presents with pelvic pain and pressure after getting an MRI today for known fibroid uterus. TVUS w/ .3cm fibroid uterus and pain somewhat relieved with Tylenol. Patient stable at this time.     - outpatient surgical management at scheduled OBGYN apt  at 12p   - pain control prn   - care per ED    d/w Dr. Sundeep Casarez PGY4 38y  LMP 6/3 presents with pelvic pain and pressure after getting an MRI today for known fibroid uterus. TVUS w/ 7.3cm fibroid uterus and pain somewhat relieved with Tylenol. Patient stable at this time.     - outpatient surgical management at scheduled OBGYN apt  at 12p   - pain control prn   - care per ED    d/w Dr. Sundeep Casarez PGY4    ATTG note    Read and agree with above PGY4 note    39 yo P1 with known h/o pelvic pain related to fibroid uterus sent to ED by PCP for pain today during a MRI.  Pt given Tylenol in ED with improvement in pain and pt declining any further pain mngt.  Pt has GYN apt in 5 days for further w/u and possible surgery.    VSS, afebrile  Exam - as above  u/s- 7cm fibroid, no other patholgy    Pt with known fibroid uterus for pelvic pain from fibroid vs midcycle ovulation pain.  Pt with symptomatic relief with Tylenol.  No surgical intervention warranted nor medical intervention warranted as no new pathology or findings seen on imaging or exam.  Pt already has GYN f/u for long term mngt in 5 days.  -No GYN recs at this time  -Keep scheduled apt in 5 days with Dr Sadie Urbano

## 2024-06-14 NOTE — ED ADULT NURSE NOTE - OBJECTIVE STATEMENT
39 y/o female came to the ED with complaints of lower abdominal pains, nausea and dysuria x a few days. No abdominal distention. Denies V, D, fevers, chills, CP, SOB, hematuria. History fo fibroids.

## 2024-06-14 NOTE — ED PROVIDER NOTE - CLINICAL SUMMARY MEDICAL DECISION MAKING FREE TEXT BOX
38-year-old F patient with history fibroids s/p D&C April 2024 presenting for evaluation of acutely worsening lower abdominal pain last night.  Patient describes proximal 1 year of chronic intermittent lower abdominal pain, this is a 38-year-old woman with history of fibroids presenting for evaluation of acutely worsening lower abdominal pain last night, intermittent sharp 10/10 right lower quadrant radiating through to back, also nausea lightheadedness, 2 weeks dysuria, sent in by GYN Dr. Mcintosh, right lower quadrant tenderness and mild suprapubic tenderness, concern for ovarian torsion versus ectopic pregnancy versus fibroid degeneration, will check transvaginal ultrasound, basic blood work, consult GYN, pain control and antiemetics as needed, follow-up results and reassess.

## 2024-06-14 NOTE — ED PROVIDER NOTE - PROGRESS NOTE DETAILS
Lazaro VARNER), PGY-1: second page to GYN, awaiting call back. Lazaro VARNER), PGY-1: spoke with GYN, they have not seen pt in the ED yet, discussed results including US showing fibroids, they will see pt in the ED. Lazaro VARNER), PGY-1: GYN endorsing pt stable for dc home and outpatient follow up with GYN next week.  pt in agreement with plan.
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
## 2024-06-15 LAB
CULTURE RESULTS: SIGNIFICANT CHANGE UP
SPECIMEN SOURCE: SIGNIFICANT CHANGE UP

## 2024-06-19 ENCOUNTER — APPOINTMENT (OUTPATIENT)
Dept: OBGYN | Facility: CLINIC | Age: 39
End: 2024-06-19

## 2024-06-19 VITALS
WEIGHT: 164 LBS | TEMPERATURE: 97.5 F | BODY MASS INDEX: 30.18 KG/M2 | HEART RATE: 75 BPM | OXYGEN SATURATION: 98 % | HEIGHT: 62 IN | SYSTOLIC BLOOD PRESSURE: 118 MMHG | DIASTOLIC BLOOD PRESSURE: 70 MMHG

## 2024-06-19 LAB
HCG UR QL: NEGATIVE
QUALITY CONTROL: YES

## 2024-06-19 PROCEDURE — 99204 OFFICE O/P NEW MOD 45 MIN: CPT

## 2024-06-19 NOTE — HISTORY OF PRESENT ILLNESS
[FreeTextEntry1] : 38  presents today for well woman exam. chronic pelvic pain, heavy menses  LMP:  6/3-6/9  POB: svdx1 PGYN: heavy, nl pap PMH: hpylori PSH: denies ab med Med: denies All: nkma SH: denies FH: hpylori stomach ca

## 2024-07-12 ENCOUNTER — APPOINTMENT (OUTPATIENT)
Dept: INTERNAL MEDICINE | Facility: CLINIC | Age: 39
End: 2024-07-12

## 2024-07-25 ENCOUNTER — APPOINTMENT (OUTPATIENT)
Dept: OBGYN | Facility: CLINIC | Age: 39
End: 2024-07-25
Payer: MEDICAID

## 2024-07-25 VITALS
WEIGHT: 164 LBS | SYSTOLIC BLOOD PRESSURE: 120 MMHG | HEART RATE: 79 BPM | TEMPERATURE: 97.5 F | BODY MASS INDEX: 30.18 KG/M2 | OXYGEN SATURATION: 98 % | DIASTOLIC BLOOD PRESSURE: 70 MMHG | HEIGHT: 62 IN

## 2024-07-25 DIAGNOSIS — D21.9 BENIGN NEOPLASM OF CONNECTIVE AND OTHER SOFT TISSUE, UNSPECIFIED: ICD-10-CM

## 2024-07-25 LAB
HCG UR QL: NEGATIVE
QUALITY CONTROL: YES

## 2024-07-25 PROCEDURE — 99204 OFFICE O/P NEW MOD 45 MIN: CPT

## 2024-07-25 NOTE — HISTORY OF PRESENT ILLNESS
[FreeTextEntry1] : Continued consultation post MRI for RA myomectomy. Patient with continued L pelvic pain/urinary urgency.  Plan for 8/15  We discussed the risks, benefits and alternatives of the procedure including, but not limited to: pain, bleeding, infection, risk of damage to the uterus, uterine perforation, chance of diagnostic laparotomy, risk of damage of structures surrounding the uterus including but not limited to bowel, bladder ureters, nerves, and vessels. The chance of deep vein thromboses was also discussed. The patient expressed understanding of the risks, benefits and alternatives of the procedure and was able to explain them in her own words.  She understands that she should not become pregnant for 6-12 mo post surgery in an effort to avoid pregnancy complications such as abnormal placentation, miscarriage, fetal demise,  labor, uterine rupture.   She also understands that should she become pregnant, the safest mode of delivery would be via planned csection prior to labor.

## 2024-07-26 ENCOUNTER — APPOINTMENT (OUTPATIENT)
Dept: INTERNAL MEDICINE | Facility: CLINIC | Age: 39
End: 2024-07-26

## 2024-07-30 ENCOUNTER — NON-APPOINTMENT (OUTPATIENT)
Age: 39
End: 2024-07-30

## 2024-07-30 ENCOUNTER — APPOINTMENT (OUTPATIENT)
Dept: INTERNAL MEDICINE | Facility: CLINIC | Age: 39
End: 2024-07-30
Payer: MEDICAID

## 2024-07-30 VITALS
SYSTOLIC BLOOD PRESSURE: 120 MMHG | OXYGEN SATURATION: 98 % | WEIGHT: 165 LBS | TEMPERATURE: 97.1 F | HEIGHT: 62 IN | RESPIRATION RATE: 16 BRPM | BODY MASS INDEX: 30.36 KG/M2 | HEART RATE: 78 BPM | DIASTOLIC BLOOD PRESSURE: 80 MMHG

## 2024-07-30 DIAGNOSIS — R00.2 PALPITATIONS: ICD-10-CM

## 2024-07-30 DIAGNOSIS — Z01.818 ENCOUNTER FOR OTHER PREPROCEDURAL EXAMINATION: ICD-10-CM

## 2024-07-30 DIAGNOSIS — F41.9 ANXIETY DISORDER, UNSPECIFIED: ICD-10-CM

## 2024-07-30 DIAGNOSIS — R53.82 CHRONIC FATIGUE, UNSPECIFIED: ICD-10-CM

## 2024-07-30 PROCEDURE — 99213 OFFICE O/P EST LOW 20 MIN: CPT

## 2024-07-30 RX ORDER — HYDROXYZINE HYDROCHLORIDE 10 MG/1
10 TABLET ORAL 3 TIMES DAILY
Qty: 30 | Refills: 0 | Status: ACTIVE | COMMUNITY
Start: 2024-07-30 | End: 1900-01-01

## 2024-07-30 NOTE — ASSESSMENT
[FreeTextEntry1] : *Anxiety, rule out panic attack.  Labs EKG urine drug screen urinalysis.  Hydroxyzine as needed.  *Vitamin D deficiency.  Patient has not yet started supplementing encouraged to do so.  *MECHELLE. Labs EKG ordered. The patient has no known drug allergies, LATEX ALLERGY. she  is not an easy bleeder, and is not anticoagulated. She  has not been on chronic immunosuppression such as chronic prednisone.She  has no history of diabetes. There is no apparent personal or family history of unprovoked VTE, malignant hyperthermia, glaucoma, or HIV/HBV/HCV. She  has no history of blood transfusion. There is no known history of sleep apnea, STOPBANG *//8.  The patient endorses sweats, headache, but exam unremarkable and may be related to menses and the weather denies signs and symptoms of active infection within the last 14 days, including: fever, shaking, chills, drenching sweats, new headache, Denies sinus pressure, purulent rhinorhea ear ache, dental thermal sensitivity, sore throat, productive cough, new wheeze, abdominal pain, change in bowel or bladder habits such as diarrhea, dysuria.  The patient denies signs and symptoms of decompensated cardiopulmonary disease including new dyspnea even with exertion, wheeze, cough, exertional chest pain, PND, orthopnea, claudication, TIA or stroke.  With regards to functional capacity, patient indicates at baseline can mount 4 METS based on their ability to walk briskly at 4MPH for 20 minutes without chest pain or presyncope.  Last tetanus booster 6/2021. The patient denies dentures.  RCRI= 0 points, class I risk (3.9% 30 day risk of death, MI, arrest), therefore no NTproBNP check indicated.   Also, would not modify or add beta blockers now that the patient is within the 30 day pre operative window; given lack of evidence by history, exam, chart review for suboptimally controlled angina, heart failure, and no history of prior MI.  The patient has no clinical predictors of increased perioperative cardiovascular risk. Therefore post op scheduled troponin, EKG surveillance is not indicated.

## 2024-07-30 NOTE — PHYSICAL EXAM
[Normal] : no posterior cervical lymphadenopathy and no anterior cervical lymphadenopathy [de-identified] : Mild thyroid asymmetry [de-identified] : Negative Tinel's sign [de-identified] : Initially anxious, but relaxes during our brief visit

## 2024-07-30 NOTE — HISTORY OF PRESENT ILLNESS
[FreeTextEntry1] : Multiple somatic concerns [de-identified] : Pleasant 38-year-old -American female with a history of chronic mild lymphocytosis, polysubstance abuse (cocaine marijuana alcohol), mild mixed hyperlipidemia, apparent chronic pelvic pain with history of 3-8 cm fibroids, reactive airway disease prescribed Flovent/prednisone/albuterol with respiratory tract infections last seen for URI April 2029, who comes in for several day history of episodic sweats tingling hands and feet head pressure without fever difficulty focusing shortness of breath without wheeze or cough palpitations.  Historically prescribed hydroxyzine and Zoloft but tells me that she never took them.  She has an upcoming myomectomy August 15.  She is understandably concerned about that.  She does not believe anyone is slipped or any drugs.  She has chosen not to follow through with psychiatry referral placed 4 months ago and is not currently on any antidepressants or anxiety medication.  Last saw gynecology July 25, plan is myomectomy. History of visits to the ED 4 times in the past 6 months, epigastric pain with small hiatal hernia on CT unremarkable labs, COVID, and most recently for subjective tachycardia in the setting of cocaine alcohol marijuana use on March 10 with pulse 108 in the ED, sinus rhythm on EKG. Received IV fluids Ativan felt better and dismissed home.

## 2024-07-31 LAB
ALBUMIN SERPL ELPH-MCNC: 4.2 G/DL
ALP BLD-CCNC: 60 U/L
ALT SERPL-CCNC: 14 U/L
ANION GAP SERPL CALC-SCNC: 13 MMOL/L
AST SERPL-CCNC: 17 U/L
BASOPHILS # BLD AUTO: 0.04 K/UL
BASOPHILS NFR BLD AUTO: 0.6 %
BILIRUB SERPL-MCNC: 0.2 MG/DL
BUN SERPL-MCNC: 12 MG/DL
CALCIUM SERPL-MCNC: 9.2 MG/DL
CHLORIDE SERPL-SCNC: 107 MMOL/L
CO2 SERPL-SCNC: 22 MMOL/L
CREAT SERPL-MCNC: 0.88 MG/DL
CRP SERPL-MCNC: <3 MG/L
EGFR: 86 ML/MIN/1.73M2
EOSINOPHIL # BLD AUTO: 0.24 K/UL
EOSINOPHIL NFR BLD AUTO: 3.3 %
FERRITIN SERPL-MCNC: 58 NG/ML
FOLATE SERPL-MCNC: 6.9 NG/ML
GLUCOSE SERPL-MCNC: 84 MG/DL
HCT VFR BLD CALC: 38.6 %
HGB BLD-MCNC: 12 G/DL
IMM GRANULOCYTES NFR BLD AUTO: 0.3 %
LYMPHOCYTES # BLD AUTO: 3.3 K/UL
LYMPHOCYTES NFR BLD AUTO: 45.5 %
MAN DIFF?: NORMAL
MCHC RBC-ENTMCNC: 30 PG
MCHC RBC-ENTMCNC: 31.1 GM/DL
MCV RBC AUTO: 96.5 FL
MONOCYTES # BLD AUTO: 0.6 K/UL
MONOCYTES NFR BLD AUTO: 8.3 %
NEUTROPHILS # BLD AUTO: 3.06 K/UL
NEUTROPHILS NFR BLD AUTO: 42 %
PLATELET # BLD AUTO: 274 K/UL
POTASSIUM SERPL-SCNC: 4.1 MMOL/L
PROT SERPL-MCNC: 6.8 G/DL
RBC # BLD: 4 M/UL
RBC # FLD: 13.3 %
SODIUM SERPL-SCNC: 141 MMOL/L
TSH SERPL-ACNC: 1.04 UIU/ML
VIT B12 SERPL-MCNC: 563 PG/ML
WBC # FLD AUTO: 7.26 K/UL

## 2024-08-01 ENCOUNTER — OUTPATIENT (OUTPATIENT)
Dept: OUTPATIENT SERVICES | Facility: HOSPITAL | Age: 39
LOS: 1 days | End: 2024-08-01
Payer: MEDICAID

## 2024-08-01 VITALS
TEMPERATURE: 99 F | DIASTOLIC BLOOD PRESSURE: 75 MMHG | OXYGEN SATURATION: 99 % | WEIGHT: 166.23 LBS | RESPIRATION RATE: 18 BRPM | HEART RATE: 75 BPM | HEIGHT: 63 IN | SYSTOLIC BLOOD PRESSURE: 118 MMHG

## 2024-08-01 DIAGNOSIS — D25.9 LEIOMYOMA OF UTERUS, UNSPECIFIED: ICD-10-CM

## 2024-08-01 DIAGNOSIS — N75.0 CYST OF BARTHOLIN'S GLAND: Chronic | ICD-10-CM

## 2024-08-01 DIAGNOSIS — Z01.818 ENCOUNTER FOR OTHER PREPROCEDURAL EXAMINATION: ICD-10-CM

## 2024-08-01 LAB
APPEARANCE UR: CLEAR — SIGNIFICANT CHANGE UP
BACTERIA # UR AUTO: ABNORMAL /HPF
BILIRUB UR-MCNC: NEGATIVE — SIGNIFICANT CHANGE UP
BLD GP AB SCN SERPL QL: SIGNIFICANT CHANGE UP
COLOR SPEC: YELLOW — SIGNIFICANT CHANGE UP
DIFF PNL FLD: ABNORMAL
EPI CELLS # UR: 15 — SIGNIFICANT CHANGE UP
GLUCOSE UR QL: NEGATIVE MG/DL — SIGNIFICANT CHANGE UP
HYALINE CASTS # UR AUTO: SIGNIFICANT CHANGE UP
KETONES UR-MCNC: NEGATIVE MG/DL — SIGNIFICANT CHANGE UP
LEUKOCYTE ESTERASE UR-ACNC: ABNORMAL
NITRITE UR-MCNC: NEGATIVE — SIGNIFICANT CHANGE UP
PH UR: 6 — SIGNIFICANT CHANGE UP (ref 5–8)
PROT UR-MCNC: NEGATIVE MG/DL — SIGNIFICANT CHANGE UP
RBC CASTS # UR COMP ASSIST: 2 /HPF — SIGNIFICANT CHANGE UP (ref 0–4)
SP GR SPEC: 1.01 — SIGNIFICANT CHANGE UP (ref 1–1.03)
UROBILINOGEN FLD QL: 1 MG/DL — SIGNIFICANT CHANGE UP (ref 0.2–1)
WBC UR QL: 6 /HPF — HIGH (ref 0–5)

## 2024-08-01 PROCEDURE — 86901 BLOOD TYPING SEROLOGIC RH(D): CPT

## 2024-08-01 PROCEDURE — 87077 CULTURE AEROBIC IDENTIFY: CPT

## 2024-08-01 PROCEDURE — 86900 BLOOD TYPING SEROLOGIC ABO: CPT

## 2024-08-01 PROCEDURE — 36415 COLL VENOUS BLD VENIPUNCTURE: CPT

## 2024-08-01 PROCEDURE — 86850 RBC ANTIBODY SCREEN: CPT

## 2024-08-01 PROCEDURE — 87086 URINE CULTURE/COLONY COUNT: CPT

## 2024-08-01 PROCEDURE — 81001 URINALYSIS AUTO W/SCOPE: CPT

## 2024-08-01 NOTE — H&P PST ADULT - NSICDXPASTSURGICALHX_GEN_ALL_CORE_FT
PAST SURGICAL HISTORY:  Bartholin cyst in november, had drainage at [facility]    History of repair of ACL

## 2024-08-01 NOTE — H&P PST ADULT - LIVES WITH, PROFILE
Well , 2 Months Old    Well-child exams are recommended visits with a health care provider to track your child's growth and development at certain ages. This sheet tells you what to expect during this visit.  Recommended immunizations  Hepatitis B vaccine. The first dose of hepatitis B vaccine should have been given before being sent home (discharged) from the hospital. Your baby should get a second dose at age 1-2 months. A third dose will be given 8 weeks later.  Rotavirus vaccine. The first dose of a 2-dose or 3-dose series should be given every 2 months starting after 6 weeks of age (or no older than 15 weeks). The last dose of this vaccine should be given before your baby is 8 months old.  Diphtheria and tetanus toxoids and acellular pertussis (DTaP) vaccine. The first dose of a 5-dose series should be given at 6 weeks of age or later.  Haemophilus influenzae type b (Hib) vaccine. The first dose of a 2- or 3-dose series and booster dose should be given at 6 weeks of age or later.  Pneumococcal conjugate (PCV13) vaccine. The first dose of a 4-dose series should be given at 6 weeks of age or later.  Inactivated poliovirus vaccine. The first dose of a 4-dose series should be given at 6 weeks of age or later.  Meningococcal conjugate vaccine. Babies who have certain high-risk conditions, are present during an outbreak, or are traveling to a country with a high rate of meningitis should receive this vaccine at 6 weeks of age or later.  Your baby may receive vaccines as individual doses or as more than one vaccine together in one shot (combination vaccines). Talk with your baby's health care provider about the risks and benefits of combination vaccines.  Testing  Your baby's length, weight, and head size (head circumference) will be measured and compared to a growth chart.  Your baby's eyes will be assessed for normal structure (anatomy) and function (physiology).  Your health care provider may recommend  more testing based on your baby's risk factors.  General instructions  Oral health  Clean your baby's gums with a soft cloth or a piece of gauze one or two times a day. Do not use toothpaste.  Skin care  To prevent diaper rash, keep your baby clean and dry. You may use over-the-counter diaper creams and ointments if the diaper area becomes irritated. Avoid diaper wipes that contain alcohol or irritating substances, such as fragrances.  When changing a girl's diaper, wipe her bottom from front to back to prevent a urinary tract infection.  Sleep  At this age, most babies take several naps each day and sleep 15-16 hours a day.  Keep naptime and bedtime routines consistent.  Lay your baby down to sleep when he or she is drowsy but not completely asleep. This can help the baby learn how to self-soothe.  Medicines  Do not give your baby medicines unless your health care provider says it is okay.  Contact a health care provider if:  You will be returning to work and need guidance on pumping and storing breast milk or finding .  You are very tired, irritable, or short-tempered, or you have concerns that you may harm your child. Parental fatigue is common. Your health care provider can refer you to specialists who will help you.  Your baby shows signs of illness.  Your baby has yellowing of the skin and the whites of the eyes (jaundice).  Your baby has a fever of 100.4°F (38°C) or higher as taken by a rectal thermometer.  What's next?  Your next visit will take place when your baby is 4 months old.  Summary  Your baby may receive a group of immunizations at this visit.  Your baby will have a physical exam, vision test, and other tests, depending on his or her risk factors.  Your baby may sleep 15-16 hours a day. Try to keep naptime and bedtime routines consistent.  Keep your baby clean and dry in order to prevent diaper rash.  This information is not intended to replace advice given to you by your health care  provider. Make sure you discuss any questions you have with your health care provider.  Document Released: 01/07/2008 Document Revised: 04/07/2020 Document Reviewed: 09/13/2019  Elsevier Patient Education © 2020 [employer]     niece and nephew/children

## 2024-08-01 NOTE — H&P PST ADULT - CARDIOVASCULAR
normal/regular rate and rhythm/S1 S2 present/no gallops/no rub/no murmur negative details… regular rate and rhythm/S1 S2 present

## 2024-08-01 NOTE — H&P PST ADULT - RESPIRATORY
normal/clear to auscultation bilaterally/no wheezes/no rales/no rhonchi normal/clear to auscultation bilaterally/no wheezes/no respiratory distress/airway patent/breath sounds equal/good air movement

## 2024-08-01 NOTE — H&P PST ADULT - PROBLEM SELECTOR PLAN 1
RIGHT Knee Arthroscopic Anterior Cruciate Ligament Reconstruction with Allograft on 05/31/2023.  NPO as per Anesthesia- no meds on AM of surgery  Hold Ibuprofen and Naproxen for 7 days pre-op  Instructions reviewed and questions addressed. Patient provided with pre-operative instructions and verbalized understanding.  Patient will be NPO on day of surgery. Patient will stop NSAIDs, aspirin, herbal supplements or vitamins 1 week prior to surgery.  Chlorhexidine wash provided with instructions, verbalized understanding.

## 2024-08-01 NOTE — H&P PST ADULT - NEUROLOGICAL
grossly intact details… sensation intact/responds to pain/responds to verbal commands/no spontaneous movement negative

## 2024-08-01 NOTE — H&P PST ADULT - MUSCULOSKELETAL
Right knee/no joint erythema/no joint warmth/no calf tenderness/decreased ROM/decreased ROM due to pain/decreased strength/abnormal gait details… negative ROM intact/no calf tenderness/normal gait
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
## 2024-08-01 NOTE — H&P PST ADULT - HISTORY OF PRESENT ILLNESS
38yo female patient with 3yr history of right knee pain after falling backward off some steps. She rates the pain at 8-9/10 at worst. She is taking Naproxen or Ibuprofen  prn with some relief. Arthroscopic ACL reconstruction has been recommended and she presents today for PSTs.  Patient is a 38 year old F presents for perioperative testing for robotic assisted myomectomy with Dr. Noel scheduled for 08/15/2024. Reports heavy menses with pelvic pain during menses, after US, recommended for surgical intervention. Denies any acute symptoms at this time. Patient otherwise feels well overall.

## 2024-08-01 NOTE — H&P PST ADULT - NSANTHOSAYNRD_GEN_A_CORE
neck 15 inches/No. MORTEZA screening performed.  STOP BANG Legend: 0-2 = LOW Risk; 3-4 = INTERMEDIATE Risk; 5-8 = HIGH Risk

## 2024-08-02 ENCOUNTER — APPOINTMENT (OUTPATIENT)
Dept: OBGYN | Facility: CLINIC | Age: 39
End: 2024-08-02

## 2024-08-02 LAB — ANACR T: NEGATIVE

## 2024-08-02 NOTE — ED PROVIDER NOTE - PATIENT PORTAL LINK FT
Referral faxed on 8/2/2024   You can access the FollowMyHealth Patient Portal offered by [facility] by registering at the following website: [email or web address]. By joining [employer]’s FollowMyHealth portal, you will also be able to view your health information using other applications (apps) compatible with our system. You can access the FollowMyHealth Patient Portal offered by [facility] by registering at the following website: [email or web address]. By joining [email or web address]’s FollowMyHealth portal, you will also be able to view your health information using other applications (apps) compatible with our system.

## 2024-08-04 LAB
CULTURE RESULTS: ABNORMAL
SPECIMEN SOURCE: SIGNIFICANT CHANGE UP

## 2024-08-07 NOTE — ADDENDUM
[FreeTextEntry1] : I, Siobhan Hanson, acted solely as a scribe for Dr. Garrett Johnson MD on this date 08/08/2024   All medical record entries made by the Scribe were at my, Dr. Garrett Johnson MD, direction and personally dictated by me on 08/08/2024 .  I have reviewed the chart and agree that the record accurately reflects my personal performance of the history, physical exam, assessment and plan. I have also personally directed, reviewed, and agreed with the chart.

## 2024-08-07 NOTE — HISTORY OF PRESENT ILLNESS
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 Patient presents ambulating independently. She has attended PT with improvements in ROM and strength. 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 She notes intermittently sharp pain on the anterior aspect of the right knee that occasionally radiates into the anterior aspect shin since. Since her last visit on 09/14/2023, she notes worsening pain the last few days of the anterior aspect of the right knee  that is intermittent in nature. Her insurance is no longer approving additional PT visits. Since her last visit on 12/11/2023, she  She also complains of intermittent left knee pain for the last few weeks.  Denies injury or trauma to the area. The patient describes the pain as a dull aching, and occasionally sharp pain localized to the medial aspect of her knee that is intermittent in nature. Her symptoms are exacerbated with bending the knee. Pain is alleviated with rest. Patient denies instability, catching or locking of the knee.

## 2024-08-07 NOTE — PHYSICAL EXAM
[Normal RLE] : Right Lower Extremity: No scars, rashes, lesions, ulcers, skin intact [Normal LLE] : Left Lower Extremity: No scars, rashes, lesions, ulcers, skin intact [Normal Touch] : sensation intact for touch [Normal] : Alert and in no acute distress [de-identified] : Right Lower Extremity o Knee :  Inspection/Palpation :  tenderness to palpation over the anterior medial proximal tibia at the site of the tight rope button, no swelling, no deformity, surgical incisions healed.   Range of Motion : 0 - 110 degrees, no crepitus  Stability : no valgus or varus instability present on provocative testing, Lachmans Test (-)  Strength : flexion and extension 3/5 with pain o Muscle Bulk : normal muscle bulk present o Skin : no erythema, no ecchymosis o Sensation : sensation to pin intact o Vascular Exam : no edema, no cyanosis, dorsalis pedis artery pulse 2+, posterior tibial artery pulse 2+  Left Lower Extremity o Knee :  Inspection/Palpation : medial joint line tenderness to palpation, no swelling, no deformity  Range of Motion : 0 -110 degrees, no crepitus  Stability : no valgus or varus instability present on provocative testing, Lachmans Test (-)  Strength : flexion and extension 5/5 o Muscle Bulk : normal muscle bulk present o Skin : no erythema, no ecchymosis o Sensation : sensation to pin intact o Vascular Exam : no edema, no cyanosis, dorsalis pedis artery pulse 2+, posterior tibial artery pulse 2+ [de-identified] : XR obtained on 12/11/2023 :  o RIGHT Knee : AP and lateral views of the knee were obtained, there are no soft tissue abnormalities, no fractures, mild to moderate tricompartmental osteoarthritis, normal bone density, no bony lesions, s/p ACL reconstruction with bone tunnel and tight rope buttons in appropriate positioning with no signs of loosening.  o LEFT Knee : AP, lateral, sunrise, and Ramesh views of the knee were obtained, there are no soft tissue abnormalities, no fractures, minimal tricompartmental osteoarthritis, normal bone density, no bony lesions

## 2024-08-07 NOTE — REASON FOR VISIT
[Follow-Up Visit] : a follow-up visit for [FreeTextEntry2] :  s/p Right knee arthroscopy with anterior cruciate reconstruction on 5/31/2023.

## 2024-08-07 NOTE — DISCUSSION/SUMMARY
[de-identified] : 38 female s/p Right knee arthroscopy with anterior cruciate reconstruction on 5/31/2023.   The underlying pathophysiology was reviewed in great detail with the patient as well as the various treatment options, including ice, analgesics, NSAIDs, Physical therapy, steroid injections, surgical removal of buttons. Viscoupplementation injections of the left knee, voltaren gel.  Activity modifications and restrictions were discussed. I advised avoiding deep bending, squatting and high intensity activity. I discussed the importance of weight loss to alleviate her knee pain.  Continue with home exercises.   I have recommended utilizing a knee sleeve to provide added support and stability.  A prescription for 800mg Ibuprofen was sent today.   FU 6 - 8 weeks or prn  All questions were answered, all alternatives discussed and the patient is in complete agreement with that plan. Follow-up appointment as instructed. Any issues and the patient will call or come in sooner.

## 2024-08-08 ENCOUNTER — APPOINTMENT (OUTPATIENT)
Dept: ORTHOPEDIC SURGERY | Facility: CLINIC | Age: 39
End: 2024-08-08

## 2024-08-08 PROBLEM — R82.71 GBS BACTERIURIA: Status: ACTIVE | Noted: 2024-08-08

## 2024-08-15 ENCOUNTER — APPOINTMENT (OUTPATIENT)
Dept: OBGYN | Facility: HOSPITAL | Age: 39
End: 2024-08-15

## 2024-08-15 ENCOUNTER — RESULT REVIEW (OUTPATIENT)
Age: 39
End: 2024-08-15

## 2024-08-15 ENCOUNTER — TRANSCRIPTION ENCOUNTER (OUTPATIENT)
Age: 39
End: 2024-08-15

## 2024-08-16 NOTE — ED PROVIDER NOTE - PRINCIPAL DIAGNOSIS
[facility]    Procedure: IR Procedure Note    Date/Time: 8/16/2024 3:34 PM    Performed by: Gregory Brambila MD  Authorized by: Gregory Brambila MD  IR Fellow Physician: Tyrone Ivory MD      UNIVERSAL PROTOCOL   Site Marked: NA  Prior Images Obtained and Reviewed:  Yes  Required items: Required blood products, implants, devices and special equipment available    Patient identity confirmed:  Verbally with patient, arm band, provided demographic data and hospital-assigned identification number  Patient was reevaluated immediately before administering moderate or deep sedation or anesthesia  Confirmation Checklist:  Patient's identity using two indicators, relevant allergies, procedure was appropriate and matched the consent or emergent situation and correct equipment/implants were available  Time out: Immediately prior to the procedure a time out was called    Universal Protocol: the Joint Commission Universal Protocol was followed    Preparation: Patient was prepped and draped in usual sterile fashion       ANESTHESIA    Anesthesia:  Local infiltration  Local Anesthetic:  Lidocaine 1% without epinephrine      SEDATION  Patient Sedated: Yes    Sedation:  Fentanyl and midazolam  Vital signs: Vital signs monitored during sedation    See dictated procedure note for full details.  Findings: 9.5F x 24 cm DL tunneled CVC via RIJV, tip at atriocaval junction. Hep locked and ready for use,.    Specimens: none    Complications: None    Condition: Stable      PROCEDURE    Patient Tolerance:  Patient tolerated the procedure well with no immediate complications  Length of time physician/provider present for 1:1 monitoring during sedation: 15       Panic attack

## 2024-08-21 ENCOUNTER — APPOINTMENT (OUTPATIENT)
Dept: CT IMAGING | Facility: HOSPITAL | Age: 39
End: 2024-08-21
Payer: MEDICAID

## 2024-08-21 ENCOUNTER — RESULT REVIEW (OUTPATIENT)
Age: 39
End: 2024-08-21

## 2024-08-21 ENCOUNTER — APPOINTMENT (OUTPATIENT)
Dept: OBGYN | Facility: CLINIC | Age: 39
End: 2024-08-21

## 2024-08-21 VITALS
SYSTOLIC BLOOD PRESSURE: 114 MMHG | TEMPERATURE: 97.1 F | OXYGEN SATURATION: 99 % | BODY MASS INDEX: 29.81 KG/M2 | WEIGHT: 162 LBS | DIASTOLIC BLOOD PRESSURE: 72 MMHG | HEIGHT: 62 IN | HEART RATE: 61 BPM

## 2024-08-21 DIAGNOSIS — N99.89 OTHER POSTPROCEDURAL COMPLICATIONS AND DISORDERS OF GENITOURINARY SYSTEM: ICD-10-CM

## 2024-08-21 DIAGNOSIS — G89.18 OTHER ACUTE POSTPROCEDURAL PAIN: ICD-10-CM

## 2024-08-21 DIAGNOSIS — R10.2 PELVIC AND PERINEAL PAIN: ICD-10-CM

## 2024-08-21 PROBLEM — D25.9 LEIOMYOMA OF UTERUS, UNSPECIFIED: Chronic | Status: ACTIVE | Noted: 2024-08-01

## 2024-08-21 LAB
BILIRUB UR QL STRIP: NEGATIVE
CLARITY UR: CLEAR
COLLECTION METHOD: NORMAL
GLUCOSE UR-MCNC: NEGATIVE
HCG UR QL: 0.2 EU/DL
HCG UR QL: NEGATIVE
HGB UR QL STRIP.AUTO: NEGATIVE
KETONES UR-MCNC: NEGATIVE
LEUKOCYTE ESTERASE UR QL STRIP: NEGATIVE
NITRITE UR QL STRIP: NEGATIVE
PH UR STRIP: 6
PROT UR STRIP-MCNC: NEGATIVE
QUALITY CONTROL: YES
SP GR UR STRIP: 1.02

## 2024-08-21 PROCEDURE — 74177 CT ABD & PELVIS W/CONTRAST: CPT | Mod: 26

## 2024-08-21 PROCEDURE — 99024 POSTOP FOLLOW-UP VISIT: CPT

## 2024-08-21 RX ORDER — SULFAMETHOXAZOLE AND TRIMETHOPRIM 800; 160 MG/1; MG/1
800-160 TABLET ORAL TWICE DAILY
Qty: 28 | Refills: 0 | Status: ACTIVE | COMMUNITY
Start: 2024-08-21 | End: 1900-01-01

## 2024-08-21 NOTE — REVIEW OF SYSTEMS
[Urgency] : no urgency [Frequency] : no frequency [Incontinence] : no incontinence [Dysuria] : no dysuria [Urethral Discharge] : no urethral discharge [Abn Vaginal bleeding] : no abnormal vaginal bleeding [Pelvic pain] : pelvic pain [CVA Pain] : no CVA pain [Genital Rash/Irritation] : no genital rash/irritation [Negative] : Heme/Lymph

## 2024-08-22 LAB
BASOPHILS # BLD AUTO: 0.06 K/UL
BASOPHILS NFR BLD AUTO: 0.7 %
EOSINOPHIL # BLD AUTO: 0.2 K/UL
EOSINOPHIL NFR BLD AUTO: 2.4 %
HCT VFR BLD CALC: 34.3 %
HGB BLD-MCNC: 10.5 G/DL
IMM GRANULOCYTES NFR BLD AUTO: 0.2 %
LYMPHOCYTES # BLD AUTO: 3.22 K/UL
LYMPHOCYTES NFR BLD AUTO: 38.7 %
MAN DIFF?: NORMAL
MCHC RBC-ENTMCNC: 29 PG
MCHC RBC-ENTMCNC: 30.6 GM/DL
MCV RBC AUTO: 94.8 FL
MONOCYTES # BLD AUTO: 0.82 K/UL
MONOCYTES NFR BLD AUTO: 9.8 %
NEUTROPHILS # BLD AUTO: 4.01 K/UL
NEUTROPHILS NFR BLD AUTO: 48.2 %
PLATELET # BLD AUTO: 307 K/UL
RBC # BLD: 3.62 M/UL
RBC # FLD: 13.2 %
WBC # FLD AUTO: 8.33 K/UL

## 2024-08-25 LAB
A VAGINAE DNA VAG QL NAA+PROBE: NORMAL
BACTERIA UR CULT: NORMAL
BVAB2 DNA VAG QL NAA+PROBE: NORMAL
C KRUSEI DNA VAG QL NAA+PROBE: NEGATIVE
C TRACH RRNA SPEC QL NAA+PROBE: NEGATIVE
CANDIDA DNA VAG QL NAA+PROBE: NEGATIVE
MEGA1 DNA VAG QL NAA+PROBE: NORMAL
N GONORRHOEA RRNA SPEC QL NAA+PROBE: NEGATIVE
T VAGINALIS RRNA SPEC QL NAA+PROBE: NEGATIVE

## 2024-09-05 ENCOUNTER — APPOINTMENT (OUTPATIENT)
Dept: OBGYN | Facility: CLINIC | Age: 39
End: 2024-09-05
Payer: MEDICAID

## 2024-09-05 VITALS
TEMPERATURE: 97.2 F | BODY MASS INDEX: 29.81 KG/M2 | HEART RATE: 102 BPM | HEIGHT: 62 IN | SYSTOLIC BLOOD PRESSURE: 118 MMHG | DIASTOLIC BLOOD PRESSURE: 60 MMHG | OXYGEN SATURATION: 97 % | WEIGHT: 162 LBS

## 2024-09-05 LAB
HCG UR QL: NEGATIVE
QUALITY CONTROL: YES

## 2024-09-05 PROCEDURE — 99024 POSTOP FOLLOW-UP VISIT: CPT

## 2024-09-05 NOTE — PLAN
[FreeTextEntry1] : Advised pt to alternate tyenol and motrin for mild pain relief on incision sites.   Pt to RTO if surgical sites is warm to touch, draining, fever, chills, and SOB.

## 2024-09-05 NOTE — ASSESSMENT
[Doing Well] : is doing well [No Sign of Infection] : is showing no signs of infection [de-identified] : Pt reports mild surgical pain on 3 surgical sites.

## 2024-09-05 NOTE — HISTORY OF PRESENT ILLNESS
[Pain is well-controlled] : pain is well-controlled [Clean/Dry/Intact] : clean, dry and intact [Normal] : normal [Pathology reviewed] : pathology reviewed [None] : no vaginal bleeding [Fever] : no fever [Chills] : no chills [Nausea] : no nausea [Vomiting] : no vomiting [Diarrhea] : no diarrhea [Vaginal Bleeding] : no vaginal bleeding [Pelvic Pressure] : no pelvic pressure [Dysuria] : no dysuria [Vaginal Discharge] : no vaginal discharge [Constipation] : no constipation [Erythema] : not erythematous [Swelling] : not swollen [Dehiscence] : not dehisced [Discharge] : absent of discharge [de-identified] : Myomectomy, robot-assisted, laparoscopic, [de-identified] : Myomectomy, robot-assisted, laparoscopic with Dr. Noel 8/15/2024  Pt reports mild incisional pain on three incision sites. Pt is overall doing well.   LMP 8/26-8/31

## 2024-09-10 ENCOUNTER — APPOINTMENT (OUTPATIENT)
Dept: OBGYN | Facility: CLINIC | Age: 39
End: 2024-09-10

## 2024-09-18 ENCOUNTER — APPOINTMENT (OUTPATIENT)
Dept: OBGYN | Facility: CLINIC | Age: 39
End: 2024-09-18
Payer: MEDICAID

## 2024-09-18 VITALS
HEART RATE: 87 BPM | SYSTOLIC BLOOD PRESSURE: 120 MMHG | OXYGEN SATURATION: 98 % | RESPIRATION RATE: 16 BRPM | DIASTOLIC BLOOD PRESSURE: 65 MMHG | HEIGHT: 62 IN | BODY MASS INDEX: 29.81 KG/M2 | WEIGHT: 162 LBS

## 2024-09-18 DIAGNOSIS — Z11.3 ENCOUNTER FOR SCREENING FOR INFECTIONS WITH A PREDOMINANTLY SEXUAL MODE OF TRANSMISSION: ICD-10-CM

## 2024-09-18 LAB
HCG UR QL: NEGATIVE
QUALITY CONTROL: YES

## 2024-09-18 PROCEDURE — 99213 OFFICE O/P EST LOW 20 MIN: CPT

## 2024-09-18 NOTE — PLAN
[FreeTextEntry1] : STD blood work ordered  Vaginal culture collected  Pt will f/u in 1 month and 5 months after for full STD panel  Mental Health referrals given.
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
## 2024-09-18 NOTE — PHYSICAL EXAM
[Chaperone Declined] : Patient declined chaperone [Appropriately responsive] : appropriately responsive [Alert] : alert [No Acute Distress] : no acute distress [Soft] : soft [Non-tender] : non-tender [Non-distended] : non-distended [No HSM] : No HSM [No Lesions] : no lesions [No Mass] : no mass [Oriented x3] : oriented x3 [Labia Majora] : normal [Labia Minora] : normal [Discharge] : a  ~M vaginal discharge was present [Scant] : scant [Foul Smelling] : not foul smelling [White] : white [Thick] : thick [Normal] : normal [Uterine Adnexae] : normal

## 2024-09-19 LAB
HBV SURFACE AG SER QL: NONREACTIVE
HCV RNA SERPL NAA+PROBE-LOG IU: NOT DETECTED LOGIU/ML
HEPC RNA INTERP: NOT DETECTED
HIV1+2 AB SPEC QL IA.RAPID: NONREACTIVE
T PALLIDUM AB SER QL IA: NEGATIVE

## 2024-09-23 LAB
A VAGINAE DNA VAG QL NAA+PROBE: NORMAL
BVAB2 DNA VAG QL NAA+PROBE: NORMAL
C KRUSEI DNA VAG QL NAA+PROBE: NEGATIVE
C TRACH RRNA SPEC QL NAA+PROBE: NEGATIVE
CANDIDA DNA VAG QL NAA+PROBE: NEGATIVE
HSV 1 IGG TYPE-SPECIFIC AB: 36.3 INDEX
HSV 2 IGG TYPE-SPECIFIC AB: <0.9 INDEX
MEGA1 DNA VAG QL NAA+PROBE: NORMAL
N GONORRHOEA RRNA SPEC QL NAA+PROBE: NEGATIVE
T VAGINALIS RRNA SPEC QL NAA+PROBE: NEGATIVE

## 2024-09-24 ENCOUNTER — APPOINTMENT (OUTPATIENT)
Dept: GASTROENTEROLOGY | Facility: CLINIC | Age: 39
End: 2024-09-24

## 2024-09-28 DIAGNOSIS — B00.2 HERPESVIRAL GINGIVOSTOMATITIS AND PHARYNGOTONSILLITIS: ICD-10-CM

## 2024-09-28 RX ORDER — VALACYCLOVIR 1 G/1
1 TABLET, FILM COATED ORAL
Qty: 4 | Refills: 1 | Status: ACTIVE | COMMUNITY
Start: 2024-09-28 | End: 1900-01-01

## 2024-10-07 ENCOUNTER — APPOINTMENT (OUTPATIENT)
Dept: GASTROENTEROLOGY | Facility: CLINIC | Age: 39
End: 2024-10-07

## 2024-11-18 ENCOUNTER — APPOINTMENT (OUTPATIENT)
Dept: INTERNAL MEDICINE | Facility: CLINIC | Age: 39
End: 2024-11-18

## 2024-11-20 ENCOUNTER — APPOINTMENT (OUTPATIENT)
Dept: INTERNAL MEDICINE | Facility: CLINIC | Age: 39
End: 2024-11-20

## 2024-11-20 VITALS
TEMPERATURE: 96.8 F | HEART RATE: 101 BPM | HEIGHT: 62 IN | WEIGHT: 163 LBS | OXYGEN SATURATION: 97 % | BODY MASS INDEX: 30 KG/M2 | SYSTOLIC BLOOD PRESSURE: 122 MMHG | RESPIRATION RATE: 14 BRPM | DIASTOLIC BLOOD PRESSURE: 64 MMHG

## 2024-11-20 DIAGNOSIS — F41.9 ANXIETY DISORDER, UNSPECIFIED: ICD-10-CM

## 2024-11-20 DIAGNOSIS — E55.9 VITAMIN D DEFICIENCY, UNSPECIFIED: ICD-10-CM

## 2024-11-20 DIAGNOSIS — R53.82 CHRONIC FATIGUE, UNSPECIFIED: ICD-10-CM

## 2024-11-20 DIAGNOSIS — Z00.00 ENCOUNTER FOR GENERAL ADULT MEDICAL EXAMINATION W/OUT ABNORMAL FINDINGS: ICD-10-CM

## 2024-11-20 DIAGNOSIS — F41.1 GENERALIZED ANXIETY DISORDER: ICD-10-CM

## 2024-11-20 DIAGNOSIS — A04.8 OTHER SPECIFIED BACTERIAL INTESTINAL INFECTIONS: ICD-10-CM

## 2024-11-20 PROCEDURE — 99395 PREV VISIT EST AGE 18-39: CPT

## 2024-11-20 RX ORDER — BUSPIRONE HYDROCHLORIDE 5 MG/1
5 TABLET ORAL
Qty: 10 | Refills: 0 | Status: ACTIVE | COMMUNITY
Start: 2024-11-20 | End: 1900-01-01

## 2024-11-27 NOTE — ED PROVIDER NOTE - CARE PROVIDER_API CALL
Left message for patient to call back (1st attempt)    Letter sent to patient (2nd attempt)    ECO - when patient call back please reach out to [facility] GI PROCEDURE SCHEDULING POOL [place]    Awaiting call back from patient    Garrett Johnson)  Orthopaedic Sports Medicine; Orthopaedic Surgery  [address] [address]  Phone: [phone]  Fax: [phone]  Follow Up Time:

## 2024-12-16 ENCOUNTER — NON-APPOINTMENT (OUTPATIENT)
Age: 39
End: 2024-12-16

## 2025-01-06 NOTE — ED PROVIDER NOTE - CPE EDP HEAD NORM PED
[FreeTextEntry1] : 83 yo Male with PMHx of HTN, HLD, DMII, HFrEF (EF 45%), CAD s/p SAMAN to mRCA (2019, previously on Plavix, stopped since 10/2024 for hematuria), chronic AF (on Eliquis), bladder cancer s/p TURP (10/4/2024, follows with Dr. Beltran) who originally presented to Dr. You c/o worsening SOB, and TTE revealed severe AS. He was referred to Dr. Trimble for intervention and presents to [facility] on 1/2/25 for planned LHC/TF TAVR. [facility] with no obstructive disease. Patient underwent 26mm sapian valve placement. Patient widened briefly intraop and the TVP was left in place. On POD 1 patient was given 50 of Metoprolol and EKG was done at 10AM. EKG with stable QRS and TVP was removed. EKG done at 2pm and was stable. Patient ambulated the halls with PT and no skilled PT needs necessary and after discussion with Dr. Trimble the patient is stable and ready for discharge home with OT. Patient is tolerating diet and passing gas.  Mr. Wade is recovering at home, his niece Daniella lives nearby and assists him. Daniella is a retired nurse. He is ambulating independently.  Pt denies dizziness, SOB, palpitations, difficulty urinating and LE swelling.  Mr. Wade verbalized a decreased appetite since October. He recently lost his partner and had a TURP in October. [Diabetes Mellitus] : Diabetes Mellitus [Diet] : controlled with diet [Dyslipidemia] : Dyslipidemia [Hypertension] : Hypertension [A fib / A flutter] : A fib / A flutter [Continuous/Persistent] : Continuous/persistent comprehensive exam...

## 2025-01-08 ENCOUNTER — EMERGENCY (EMERGENCY)
Facility: HOSPITAL | Age: 40
LOS: 1 days | Discharge: HOME | End: 2025-01-08
Attending: INTERNAL MEDICINE | Admitting: INTERNAL MEDICINE
Payer: MEDICAID

## 2025-01-08 VITALS
WEIGHT: 173.06 LBS | OXYGEN SATURATION: 100 % | SYSTOLIC BLOOD PRESSURE: 125 MMHG | DIASTOLIC BLOOD PRESSURE: 74 MMHG | HEART RATE: 69 BPM | HEIGHT: 62 IN | RESPIRATION RATE: 16 BRPM

## 2025-01-08 VITALS
DIASTOLIC BLOOD PRESSURE: 78 MMHG | RESPIRATION RATE: 18 BRPM | HEART RATE: 70 BPM | SYSTOLIC BLOOD PRESSURE: 116 MMHG | OXYGEN SATURATION: 99 %

## 2025-01-08 DIAGNOSIS — N75.0 CYST OF BARTHOLIN'S GLAND: Chronic | ICD-10-CM

## 2025-01-08 LAB
ALBUMIN SERPL ELPH-MCNC: 3.3 G/DL — SIGNIFICANT CHANGE UP (ref 3.3–5)
ALP SERPL-CCNC: 62 U/L — SIGNIFICANT CHANGE UP (ref 40–120)
ALT FLD-CCNC: 29 U/L — SIGNIFICANT CHANGE UP (ref 10–45)
ANION GAP SERPL CALC-SCNC: 10 MMOL/L — SIGNIFICANT CHANGE UP (ref 5–17)
AST SERPL-CCNC: 15 U/L — SIGNIFICANT CHANGE UP (ref 10–40)
BASOPHILS # BLD AUTO: 0.04 K/UL — SIGNIFICANT CHANGE UP (ref 0–0.2)
BASOPHILS NFR BLD AUTO: 0.7 % — SIGNIFICANT CHANGE UP (ref 0–2)
BILIRUB SERPL-MCNC: 0.3 MG/DL — SIGNIFICANT CHANGE UP (ref 0.2–1.2)
BUN SERPL-MCNC: 14 MG/DL — SIGNIFICANT CHANGE UP (ref 7–23)
CALCIUM SERPL-MCNC: 9.3 MG/DL — SIGNIFICANT CHANGE UP (ref 8.4–10.5)
CHLORIDE SERPL-SCNC: 104 MMOL/L — SIGNIFICANT CHANGE UP (ref 96–108)
CO2 SERPL-SCNC: 26 MMOL/L — SIGNIFICANT CHANGE UP (ref 22–31)
CREAT SERPL-MCNC: 0.92 MG/DL — SIGNIFICANT CHANGE UP (ref 0.5–1.3)
EGFR: 80 ML/MIN/1.73M2 — SIGNIFICANT CHANGE UP
EOSINOPHIL # BLD AUTO: 0.14 K/UL — SIGNIFICANT CHANGE UP (ref 0–0.5)
EOSINOPHIL NFR BLD AUTO: 2.4 % — SIGNIFICANT CHANGE UP (ref 0–6)
FLUAV AG NPH QL: SIGNIFICANT CHANGE UP
FLUBV AG NPH QL: SIGNIFICANT CHANGE UP
GLUCOSE SERPL-MCNC: 82 MG/DL — SIGNIFICANT CHANGE UP (ref 70–99)
HCT VFR BLD CALC: 35 % — SIGNIFICANT CHANGE UP (ref 34.5–45)
HGB BLD-MCNC: 11.5 G/DL — SIGNIFICANT CHANGE UP (ref 11.5–15.5)
IMM GRANULOCYTES NFR BLD AUTO: 0.2 % — SIGNIFICANT CHANGE UP (ref 0–0.9)
LYMPHOCYTES # BLD AUTO: 2.85 K/UL — SIGNIFICANT CHANGE UP (ref 1–3.3)
LYMPHOCYTES # BLD AUTO: 48.3 % — HIGH (ref 13–44)
MCHC RBC-ENTMCNC: 29.6 PG — SIGNIFICANT CHANGE UP (ref 27–34)
MCHC RBC-ENTMCNC: 32.9 G/DL — SIGNIFICANT CHANGE UP (ref 32–36)
MCV RBC AUTO: 90.2 FL — SIGNIFICANT CHANGE UP (ref 80–100)
MONOCYTES # BLD AUTO: 0.49 K/UL — SIGNIFICANT CHANGE UP (ref 0–0.9)
MONOCYTES NFR BLD AUTO: 8.3 % — SIGNIFICANT CHANGE UP (ref 2–14)
NEUTROPHILS # BLD AUTO: 2.37 K/UL — SIGNIFICANT CHANGE UP (ref 1.8–7.4)
NEUTROPHILS NFR BLD AUTO: 40.1 % — LOW (ref 43–77)
NRBC # BLD: 0 /100 WBCS — SIGNIFICANT CHANGE UP (ref 0–0)
PLATELET # BLD AUTO: 272 K/UL — SIGNIFICANT CHANGE UP (ref 150–400)
POTASSIUM SERPL-MCNC: 3.5 MMOL/L — SIGNIFICANT CHANGE UP (ref 3.5–5.3)
POTASSIUM SERPL-SCNC: 3.5 MMOL/L — SIGNIFICANT CHANGE UP (ref 3.5–5.3)
PROT SERPL-MCNC: 7.2 G/DL — SIGNIFICANT CHANGE UP (ref 6–8.3)
RBC # BLD: 3.88 M/UL — SIGNIFICANT CHANGE UP (ref 3.8–5.2)
RBC # FLD: 13.1 % — SIGNIFICANT CHANGE UP (ref 10.3–14.5)
RSV RNA NPH QL NAA+NON-PROBE: SIGNIFICANT CHANGE UP
SARS-COV-2 RNA SPEC QL NAA+PROBE: SIGNIFICANT CHANGE UP
SODIUM SERPL-SCNC: 140 MMOL/L — SIGNIFICANT CHANGE UP (ref 135–145)
TROPONIN I, HIGH SENSITIVITY RESULT: 5.3 NG/L — SIGNIFICANT CHANGE UP
WBC # BLD: 5.9 K/UL — SIGNIFICANT CHANGE UP (ref 3.8–10.5)
WBC # FLD AUTO: 5.9 K/UL — SIGNIFICANT CHANGE UP (ref 3.8–10.5)

## 2025-01-08 PROCEDURE — 96374 THER/PROPH/DIAG INJ IV PUSH: CPT

## 2025-01-08 PROCEDURE — 84484 ASSAY OF TROPONIN QUANT: CPT

## 2025-01-08 PROCEDURE — 99285 EMERGENCY DEPT VISIT HI MDM: CPT

## 2025-01-08 PROCEDURE — 36415 COLL VENOUS BLD VENIPUNCTURE: CPT

## 2025-01-08 PROCEDURE — 71045 X-RAY EXAM CHEST 1 VIEW: CPT

## 2025-01-08 PROCEDURE — 80053 COMPREHEN METABOLIC PANEL: CPT

## 2025-01-08 PROCEDURE — 87637 SARSCOV2&INF A&B&RSV AMP PRB: CPT

## 2025-01-08 PROCEDURE — 93010 ELECTROCARDIOGRAM REPORT: CPT

## 2025-01-08 PROCEDURE — 93005 ELECTROCARDIOGRAM TRACING: CPT

## 2025-01-08 PROCEDURE — 71045 X-RAY EXAM CHEST 1 VIEW: CPT | Mod: 26

## 2025-01-08 PROCEDURE — 85025 COMPLETE CBC W/AUTO DIFF WBC: CPT

## 2025-01-08 PROCEDURE — 99285 EMERGENCY DEPT VISIT HI MDM: CPT | Mod: 25

## 2025-01-08 PROCEDURE — 94640 AIRWAY INHALATION TREATMENT: CPT

## 2025-01-08 RX ORDER — SODIUM CHLORIDE 9 MG/ML
1000 INJECTION, SOLUTION INTRAMUSCULAR; INTRAVENOUS; SUBCUTANEOUS ONCE
Refills: 0 | Status: COMPLETED | OUTPATIENT
Start: 2025-01-08 | End: 2025-01-08

## 2025-01-08 RX ORDER — LORAZEPAM 1 MG/1
1 TABLET ORAL
Qty: 9 | Refills: 0
Start: 2025-01-08 | End: 2025-01-10

## 2025-01-08 RX ORDER — IPRATROPIUM BROMIDE AND ALBUTEROL SULFATE .5; 2.5 MG/3ML; MG/3ML
3 SOLUTION RESPIRATORY (INHALATION) ONCE
Refills: 0 | Status: COMPLETED | OUTPATIENT
Start: 2025-01-08 | End: 2025-01-08

## 2025-01-08 RX ORDER — LORAZEPAM 1 MG/1
1 TABLET ORAL ONCE
Refills: 0 | Status: DISCONTINUED | OUTPATIENT
Start: 2025-01-08 | End: 2025-01-08

## 2025-01-08 RX ADMIN — SODIUM CHLORIDE 1000 MILLILITER(S): 9 INJECTION, SOLUTION INTRAMUSCULAR; INTRAVENOUS; SUBCUTANEOUS at 10:58

## 2025-01-08 RX ADMIN — IPRATROPIUM BROMIDE AND ALBUTEROL SULFATE 3 MILLILITER(S): .5; 2.5 SOLUTION RESPIRATORY (INHALATION) at 12:44

## 2025-01-08 RX ADMIN — LORAZEPAM 1 MILLIGRAM(S): 1 TABLET ORAL at 10:58

## 2025-01-08 NOTE — ED PROVIDER NOTE - OBJECTIVE STATEMENT
39-year-old female came to the emergency room chief complaint of shortness of breath felt something bad is going to happen to her very anxious patient stated that she is under a lot of stress patient was also complaining of chest pain

## 2025-01-08 NOTE — ED ADULT TRIAGE NOTE - SPO2 (%)
-- DO NOT REPLY / DO NOT REPLY ALL --  -- Message is from Engagement Center Operations (ECO) --    General Patient Message: Patient is returning a call to Lizzy, she has found the medication at [facility], [address] [place] [address]. Their phone number is [phone]. They only have the 2.5 mg of the Altace, patient takes 5 mg.     Caller Information       Type Contact Phone/Fax    09/01/2023 11:37 AM CDT Phone (Incoming) Radha Archuleta (Self) [phone] (M)    09/01/2023 03:54 PM CDT Phone (Outgoing) Radha Archuleta (Self) [phone] (M)    Left Message     09/01/2023 03:54 PM CDT Phone (Outgoing) Radha Archuleta (Self) [phone] (M)    09/05/2023 08:31 AM CDT Phone (Incoming) Radha Archuleta (Self) [phone] (M)        Alternative phone number: none    Can a detailed message be left? Yes    Message Turnaround: [place]:    Refer to site's KB page for routing instructions    Please give this turnaround time to the caller:   \"You can expect to receive a response 1-3 business days after your provider's clinical team reviews the message\"               100

## 2025-01-08 NOTE — ED PROVIDER NOTE - NSFOLLOWUPINSTRUCTIONS_ED_ALL_ED_FT
Follow up with your PMD within 1-2 days.  Rest, increase your fluids, advance your activity as tolerated.   Take all of your other medications as previously prescribed.   Worsening, continued or ANY new concerning symptoms return to the emergency department.  Ativan 1 mg at night for anxiety follow-up with psychiatry outpatient  has made made arrangement for the psychiatry outpatient

## 2025-01-08 NOTE — ED PROVIDER NOTE - PHYSICAL EXAMINATION
General:     NAD, well-nourished, well-appearing  Head:     NC/AT, EOMI, oral mucosa moist  Neck:     trachea midline  Lungs:     CTA b/l, no w/r/r  CVS:     S1S2, RRR, no m/g/r  Abd:     +BS, s/nt/nd, no organomegaly  Ext:    2+ radial and pedal pulses, no c/c/e  Neuro: AAOx3, no sensory/motor deficits  Psych–patient is very anxious no suicidal or homicidal ideations no delusions or hallucinations

## 2025-01-08 NOTE — ED PROVIDER NOTE - CLINICAL SUMMARY MEDICAL DECISION MAKING FREE TEXT BOX
39-year-old female came to the emergency room chief complaint of shortness of breath felt something bad is going to happen to her very anxious patient stated that she is under a lot of stress patient was also complaining of chest pain  CBC CMP troponins within normal limits EKG normal sinus rhythm chest x-ray clear

## 2025-01-08 NOTE — ED ADULT NURSE NOTE - NSFALLUNIVINTERV_ED_ALL_ED
Bed/Stretcher in lowest position, wheels locked, appropriate side rails in place/Call bell, personal items and telephone in reach/Instruct patient to call for assistance before getting out of bed/chair/stretcher/Non-slip footwear applied when patient is off stretcher/[place] to call system/Physically safe environment - no spills, clutter or unnecessary equipment/Purposeful proactive rounding/Room/bathroom lighting operational, light cord in reach

## 2025-01-08 NOTE — ED PROVIDER NOTE - NSICDXPASTSURGICALHX_GEN_ALL_CORE_FT
PAST SURGICAL HISTORY:  Bartholin cyst in november, had drainage at [facility]    History of repair of ACL

## 2025-01-08 NOTE — CHART NOTE - NSCHARTNOTEFT_GEN_A_CORE
39-year-old female came to the emergency room chief complaint of shortness of breath felt something bad is going to happen to her very anxious patient stated that she is under a lot of stress patient was also complaining of chest pain as per chart.  SW met with patient at bedside to discuss options to manage anxiety.  The patient was agreeable to, and provided with a list of psychotherapists in network with her [employer].  ED provider recommended primary care follow up and patient will schedule her appointment.  Contact information was provided for further assistance if required.

## 2025-01-08 NOTE — ED PROVIDER NOTE - PATIENT PORTAL LINK FT
You can access the FollowMyHealth Patient Portal offered by [facility] by registering at the following website: [email or web address]. By joining [employer]’s FollowMyHealth portal, you will also be able to view your health information using other applications (apps) compatible with our system.

## 2025-01-08 NOTE — ED ADULT NURSE NOTE - NSICDXPASTSURGICALHX_GEN_ALL_CORE_FT
PAST SURGICAL HISTORY:  Bartholin cyst in november, had drainage at [facility]    History of repair of ACL

## 2025-01-19 ENCOUNTER — NON-APPOINTMENT (OUTPATIENT)
Age: 40
End: 2025-01-19

## 2025-01-30 ENCOUNTER — APPOINTMENT (OUTPATIENT)
Dept: ORTHOPEDIC SURGERY | Facility: CLINIC | Age: 40
End: 2025-01-30

## 2025-02-03 ENCOUNTER — APPOINTMENT (OUTPATIENT)
Dept: OBGYN | Facility: CLINIC | Age: 40
End: 2025-02-03
Payer: MEDICAID

## 2025-02-03 VITALS
DIASTOLIC BLOOD PRESSURE: 74 MMHG | BODY MASS INDEX: 30.36 KG/M2 | HEART RATE: 59 BPM | SYSTOLIC BLOOD PRESSURE: 110 MMHG | HEIGHT: 62 IN | WEIGHT: 165 LBS | TEMPERATURE: 97 F | OXYGEN SATURATION: 96 %

## 2025-02-03 DIAGNOSIS — R10.9 UNSPECIFIED ABDOMINAL PAIN: ICD-10-CM

## 2025-02-03 PROCEDURE — 99213 OFFICE O/P EST LOW 20 MIN: CPT

## 2025-02-04 LAB
FERRITIN SERPL-MCNC: 91 NG/ML
HCT VFR BLD CALC: 35.7 %
HGB BLD-MCNC: 11.6 G/DL
IRON SERPL-MCNC: 101 UG/DL
MCHC RBC-ENTMCNC: 29 PG
MCHC RBC-ENTMCNC: 32.5 G/DL
MCV RBC AUTO: 89.3 FL
PLATELET # BLD AUTO: 308 K/UL
RBC # BLD: 4 M/UL
RBC # FLD: 12.5 %
WBC # FLD AUTO: 6.09 K/UL

## 2025-02-05 LAB — BACTERIA UR CULT: NORMAL

## 2025-02-07 DIAGNOSIS — N89.8 OTHER SPECIFIED NONINFLAMMATORY DISORDERS OF VAGINA: ICD-10-CM

## 2025-02-07 LAB — BACTERIA GENITAL AEROBE CULT: ABNORMAL

## 2025-02-07 RX ORDER — CEPHALEXIN 500 MG/1
500 CAPSULE ORAL
Qty: 14 | Refills: 0 | Status: ACTIVE | COMMUNITY
Start: 2025-02-07 | End: 1900-01-01

## 2025-02-14 ENCOUNTER — NON-APPOINTMENT (OUTPATIENT)
Age: 40
End: 2025-02-14

## 2025-02-26 ENCOUNTER — APPOINTMENT (OUTPATIENT)
Dept: OBGYN | Facility: CLINIC | Age: 40
End: 2025-02-26

## 2025-03-03 ENCOUNTER — APPOINTMENT (OUTPATIENT)
Dept: ORTHOPEDIC SURGERY | Facility: CLINIC | Age: 40
End: 2025-03-03

## 2025-03-10 ENCOUNTER — APPOINTMENT (OUTPATIENT)
Dept: INTERNAL MEDICINE | Facility: CLINIC | Age: 40
End: 2025-03-10
Payer: MEDICAID

## 2025-03-10 ENCOUNTER — EMERGENCY (EMERGENCY)
Facility: HOSPITAL | Age: 40
LOS: 1 days | Discharge: HOME | End: 2025-03-10
Attending: EMERGENCY MEDICINE | Admitting: EMERGENCY MEDICINE
Payer: MEDICAID

## 2025-03-10 VITALS
OXYGEN SATURATION: 100 % | WEIGHT: 158.95 LBS | DIASTOLIC BLOOD PRESSURE: 68 MMHG | HEART RATE: 71 BPM | TEMPERATURE: 99 F | HEIGHT: 62 IN | RESPIRATION RATE: 20 BRPM | SYSTOLIC BLOOD PRESSURE: 129 MMHG

## 2025-03-10 VITALS
OXYGEN SATURATION: 99 % | RESPIRATION RATE: 16 BRPM | HEIGHT: 62 IN | HEART RATE: 85 BPM | DIASTOLIC BLOOD PRESSURE: 76 MMHG | BODY MASS INDEX: 29.26 KG/M2 | TEMPERATURE: 97.7 F | SYSTOLIC BLOOD PRESSURE: 114 MMHG | WEIGHT: 159 LBS

## 2025-03-10 VITALS
TEMPERATURE: 99 F | SYSTOLIC BLOOD PRESSURE: 112 MMHG | RESPIRATION RATE: 17 BRPM | DIASTOLIC BLOOD PRESSURE: 68 MMHG | HEART RATE: 81 BPM | OXYGEN SATURATION: 100 %

## 2025-03-10 DIAGNOSIS — R20.2 PARESTHESIA OF SKIN: ICD-10-CM

## 2025-03-10 DIAGNOSIS — G44.209 TENSION-TYPE HEADACHE, UNSPECIFIED, NOT INTRACTABLE: ICD-10-CM

## 2025-03-10 DIAGNOSIS — R07.89 OTHER CHEST PAIN: ICD-10-CM

## 2025-03-10 DIAGNOSIS — N75.0 CYST OF BARTHOLIN'S GLAND: Chronic | ICD-10-CM

## 2025-03-10 LAB
ALBUMIN SERPL ELPH-MCNC: 3.4 G/DL — SIGNIFICANT CHANGE UP (ref 3.3–5)
ALP SERPL-CCNC: 63 U/L — SIGNIFICANT CHANGE UP (ref 30–120)
ALT FLD-CCNC: 23 U/L — SIGNIFICANT CHANGE UP (ref 10–60)
ANION GAP SERPL CALC-SCNC: 6 MMOL/L — SIGNIFICANT CHANGE UP (ref 5–17)
AST SERPL-CCNC: 16 U/L — SIGNIFICANT CHANGE UP (ref 10–40)
BASOPHILS # BLD AUTO: 0.04 K/UL — SIGNIFICANT CHANGE UP (ref 0–0.2)
BASOPHILS NFR BLD AUTO: 0.5 % — SIGNIFICANT CHANGE UP (ref 0–2)
BILIRUB SERPL-MCNC: 0.4 MG/DL — SIGNIFICANT CHANGE UP (ref 0.2–1.2)
BUN SERPL-MCNC: 15 MG/DL — SIGNIFICANT CHANGE UP (ref 7–23)
CALCIUM SERPL-MCNC: 9 MG/DL — SIGNIFICANT CHANGE UP (ref 8.4–10.5)
CHLORIDE SERPL-SCNC: 106 MMOL/L — SIGNIFICANT CHANGE UP (ref 96–108)
CO2 SERPL-SCNC: 29 MMOL/L — SIGNIFICANT CHANGE UP (ref 22–31)
CREAT SERPL-MCNC: 0.86 MG/DL — SIGNIFICANT CHANGE UP (ref 0.5–1.3)
D DIMER BLD IA.RAPID-MCNC: <150 NG/ML DDU — SIGNIFICANT CHANGE UP
EGFR: 88 ML/MIN/1.73M2 — SIGNIFICANT CHANGE UP
EOSINOPHIL # BLD AUTO: 0.21 K/UL — SIGNIFICANT CHANGE UP (ref 0–0.5)
EOSINOPHIL NFR BLD AUTO: 2.4 % — SIGNIFICANT CHANGE UP (ref 0–6)
GLUCOSE SERPL-MCNC: 92 MG/DL — SIGNIFICANT CHANGE UP (ref 70–99)
HCG SERPL-ACNC: <1 MIU/ML — SIGNIFICANT CHANGE UP
HCT VFR BLD CALC: 33 % — LOW (ref 34.5–45)
HGB BLD-MCNC: 10.8 G/DL — LOW (ref 11.5–15.5)
IMM GRANULOCYTES NFR BLD AUTO: 0.2 % — SIGNIFICANT CHANGE UP (ref 0–0.9)
LYMPHOCYTES # BLD AUTO: 3.77 K/UL — HIGH (ref 1–3.3)
LYMPHOCYTES # BLD AUTO: 43.3 % — SIGNIFICANT CHANGE UP (ref 13–44)
MCHC RBC-ENTMCNC: 29.3 PG — SIGNIFICANT CHANGE UP (ref 27–34)
MCHC RBC-ENTMCNC: 32.7 G/DL — SIGNIFICANT CHANGE UP (ref 32–36)
MCV RBC AUTO: 89.7 FL — SIGNIFICANT CHANGE UP (ref 80–100)
MONOCYTES # BLD AUTO: 0.65 K/UL — SIGNIFICANT CHANGE UP (ref 0–0.9)
MONOCYTES NFR BLD AUTO: 7.5 % — SIGNIFICANT CHANGE UP (ref 2–14)
NEUTROPHILS # BLD AUTO: 4.02 K/UL — SIGNIFICANT CHANGE UP (ref 1.8–7.4)
NEUTROPHILS NFR BLD AUTO: 46.1 % — SIGNIFICANT CHANGE UP (ref 43–77)
NRBC BLD AUTO-RTO: 0 /100 WBCS — SIGNIFICANT CHANGE UP (ref 0–0)
PLATELET # BLD AUTO: 235 K/UL — SIGNIFICANT CHANGE UP (ref 150–400)
POTASSIUM SERPL-MCNC: 3.5 MMOL/L — SIGNIFICANT CHANGE UP (ref 3.5–5.3)
POTASSIUM SERPL-SCNC: 3.5 MMOL/L — SIGNIFICANT CHANGE UP (ref 3.5–5.3)
PROT SERPL-MCNC: 6.8 G/DL — SIGNIFICANT CHANGE UP (ref 6–8.3)
RBC # BLD: 3.68 M/UL — LOW (ref 3.8–5.2)
RBC # FLD: 12.4 % — SIGNIFICANT CHANGE UP (ref 10.3–14.5)
SODIUM SERPL-SCNC: 141 MMOL/L — SIGNIFICANT CHANGE UP (ref 135–145)
TROPONIN I, HIGH SENSITIVITY RESULT: <4 NG/L — SIGNIFICANT CHANGE UP
WBC # BLD: 8.71 K/UL — SIGNIFICANT CHANGE UP (ref 3.8–10.5)
WBC # FLD AUTO: 8.71 K/UL — SIGNIFICANT CHANGE UP (ref 3.8–10.5)

## 2025-03-10 PROCEDURE — 84702 CHORIONIC GONADOTROPIN TEST: CPT

## 2025-03-10 PROCEDURE — 85379 FIBRIN DEGRADATION QUANT: CPT

## 2025-03-10 PROCEDURE — 71045 X-RAY EXAM CHEST 1 VIEW: CPT | Mod: 26

## 2025-03-10 PROCEDURE — 93010 ELECTROCARDIOGRAM REPORT: CPT

## 2025-03-10 PROCEDURE — 96374 THER/PROPH/DIAG INJ IV PUSH: CPT

## 2025-03-10 PROCEDURE — 84484 ASSAY OF TROPONIN QUANT: CPT

## 2025-03-10 PROCEDURE — 96375 TX/PRO/DX INJ NEW DRUG ADDON: CPT

## 2025-03-10 PROCEDURE — 80053 COMPREHEN METABOLIC PANEL: CPT

## 2025-03-10 PROCEDURE — 71045 X-RAY EXAM CHEST 1 VIEW: CPT

## 2025-03-10 PROCEDURE — 99214 OFFICE O/P EST MOD 30 MIN: CPT

## 2025-03-10 PROCEDURE — 36415 COLL VENOUS BLD VENIPUNCTURE: CPT

## 2025-03-10 PROCEDURE — 93005 ELECTROCARDIOGRAM TRACING: CPT

## 2025-03-10 PROCEDURE — 99285 EMERGENCY DEPT VISIT HI MDM: CPT | Mod: 25

## 2025-03-10 PROCEDURE — 96361 HYDRATE IV INFUSION ADD-ON: CPT

## 2025-03-10 PROCEDURE — 99285 EMERGENCY DEPT VISIT HI MDM: CPT

## 2025-03-10 PROCEDURE — 85025 COMPLETE CBC W/AUTO DIFF WBC: CPT

## 2025-03-10 RX ORDER — ACETAMINOPHEN 500 MG/5ML
1000 LIQUID (ML) ORAL ONCE
Refills: 0 | Status: COMPLETED | OUTPATIENT
Start: 2025-03-10 | End: 2025-03-10

## 2025-03-10 RX ORDER — METOCLOPRAMIDE HCL 10 MG
10 TABLET ORAL ONCE
Refills: 0 | Status: COMPLETED | OUTPATIENT
Start: 2025-03-10 | End: 2025-03-10

## 2025-03-10 RX ADMIN — Medication 1000 MILLIGRAM(S): at 20:19

## 2025-03-10 RX ADMIN — Medication 400 MILLIGRAM(S): at 20:04

## 2025-03-10 RX ADMIN — Medication 10 MILLIGRAM(S): at 20:17

## 2025-03-10 RX ADMIN — Medication 1000 MILLILITER(S): at 20:05

## 2025-03-10 RX ADMIN — Medication 1000 MILLILITER(S): at 21:05

## 2025-03-10 RX ADMIN — Medication 1000 MILLIGRAM(S): at 21:00

## 2025-03-10 NOTE — ED PROVIDER NOTE - PATIENT PORTAL LINK FT
You can access the FollowMyHealth Patient Portal offered by [facility] by registering at the following website: [email or web address]. By joining [employer]’s FollowMyHealth portal, you will also be able to view your health information using other applications (apps) compatible with our system.

## 2025-03-10 NOTE — ED ADULT NURSE NOTE - NS TRANSFER RESPONSE BELONGING
Assessment/Plan:         Diagnoses and all orders for this visit:    Conjunctivitis, unspecified conjunctivitis type, unspecified laterality  -     erythromycin (ILOTYCIN) ophthalmic ointment; Administer 0.5 inches to both eyes every 6 (six) hours for 7 days        Conjunctivitis vs NLDO.  Supportive care and follow up instructions reviewed.  Recheck for fever, increasing or persisting symptoms.    Subjective:      Patient ID: Jez Marrufo is a 5 m.o. male.    Conjunctivitis   The current episode started 2 days ago. The onset was sudden. The problem has been gradually improving. The problem is mild. Nothing relieves the symptoms. Nothing aggravates the symptoms. Associated symptoms include congestion, rhinorrhea, cough, URI and eye discharge. Pertinent negatives include no fever, no sore throat, no stridor, no rash, no eye pain and no eye redness. Both eyes are affected. He has been Behaving normally. He has been Eating and drinking normally. Urine output has been normal. There were sick contacts at home (twin sibling with pink eye two weeks ago).       The following portions of the patient's history were reviewed and updated as appropriate: allergies, current medications, past family history, past medical history, past social history, past surgical history, and problem list.    Review of Systems   Constitutional:  Negative for fever.   HENT:  Positive for congestion and rhinorrhea. Negative for sore throat.    Eyes:  Positive for discharge. Negative for pain and redness.   Respiratory:  Positive for cough. Negative for stridor.    Skin:  Negative for rash.         Objective:      Pulse 122   Resp 30   Wt 7.69 kg (16 lb 15.3 oz)          Physical Exam  Vitals and nursing note reviewed.   Constitutional:       General: He is active, vigorous and smiling. He has a strong cry. He is not in acute distress.     Appearance: He is well-developed.   HENT:      Head: Normocephalic and atraumatic. No cranial deformity.  Anterior fontanelle is flat.      Right Ear: Tympanic membrane normal. Tympanic membrane is not erythematous or bulging.      Left Ear: Tympanic membrane normal. Tympanic membrane is not erythematous or bulging.      Nose: Congestion present. No rhinorrhea.      Mouth/Throat:      Mouth: Mucous membranes are moist.      Pharynx: Oropharynx is clear. No oropharyngeal exudate or posterior oropharyngeal erythema.   Eyes:      General: Red reflex is present bilaterally. Visual tracking is normal. Vision grossly intact. Gaze aligned appropriately.         Right eye: No erythema.         Left eye: No erythema.      No periorbital erythema on the right side. No periorbital erythema on the left side.      Conjunctiva/sclera: Conjunctivae normal.      Pupils: Pupils are equal, round, and reactive to light.      Comments: Dried mucoid discharge to inner canthus ou.  Mild erythema to lower palpebral conjunctival bilaterally.   Cardiovascular:      Rate and Rhythm: Normal rate and regular rhythm.      Pulses: Normal pulses.      Heart sounds: Normal heart sounds, S1 normal and S2 normal. No murmur heard.  Pulmonary:      Effort: Pulmonary effort is normal.      Breath sounds: Normal breath sounds. No stridor. No wheezing, rhonchi or rales.   Abdominal:      General: Bowel sounds are normal. There is no distension.      Palpations: Abdomen is soft. There is no mass.      Tenderness: There is no abdominal tenderness. There is no guarding or rebound.      Hernia: No hernia is present.   Genitourinary:     Testes: Cremasteric reflex is present.   Musculoskeletal:         General: No deformity. Normal range of motion.      Cervical back: Normal range of motion and neck supple.   Lymphadenopathy:      Cervical: No cervical adenopathy.   Skin:     General: Skin is warm.      Capillary Refill: Capillary refill takes less than 2 seconds.      Turgor: Normal.   Neurological:      General: No focal deficit present.      Mental Status: He  is alert.            yes

## 2025-03-10 NOTE — ED ADULT NURSE NOTE - NSICDXPASTSURGICALHX_GEN_ALL_CORE_FT
PAST SURGICAL HISTORY:  Bartholin cyst in november, had drainage at [facility]    History of repair of ACL

## 2025-03-10 NOTE — ED PROVIDER NOTE - PROVIDER TOKENS
PROVIDER:[TOKEN:[[id number]],FOLLOWUP:[1-3 Days]],PROVIDER:[TOKEN:[[id number]],FOLLOWUP:[1-3 Days]],PROVIDER:[TOKEN:[[id number]],FOLLOWUP:[1-3 Days]]

## 2025-03-10 NOTE — ED PROVIDER NOTE - PROGRESS NOTE DETAILS
Patient stable.  Overall much improved.  Headache resolved.  No dizziness.  No chest pain.  D-dimer negative, do not suspect pulmonary embolism.  Patient states similar chest pain and dizziness in the past for several years, worse the past several months.  Recommend outpatient follow-up with cardiology and neurology.  Information also provided to referral coordinator to help arrange outpatient follow-up.  Patient comfortable with plan.

## 2025-03-10 NOTE — ED PROVIDER NOTE - OBJECTIVE STATEMENT
39-year-old female with history of hypertension, PCOS, anxiety, and H pylori presents with chest pain since yesterday.  Patient also reports some intermittent headaches and dizziness.  Patient reports some tingling to left arm and fingertips.  Slight occasional nausea.  No vomiting.  Denies any injury or trauma.  Patient reports similar symptoms in the past for several years, worse the past several months.  States she has been seen multiple times in emergency room for same complaint.  States she had cardiac workup and CTs of head in the past which were unremarkable.  States she was told symptoms likely related to anxiety.  Patient states she does not believe it is related to anxiety.  Patient has not been seen by neurology or cardiology for same complaint.  Patient was seen by primary care doctor and told to come to emergency room for further evaluation.  No history of DVT or PE.  No calf pain or swelling.  No recent travels  PCP Claude Bridges

## 2025-03-10 NOTE — ED PROVIDER NOTE - CARE PROVIDER_API CALL
Ismael Hobbs  Cardiovascular Disease  [address] [place] [address]  Phone: [phone]  Fax: [phone]  Follow Up Time: 1-3 Days    Yancy March  Neurology  [address] [facility], [address]  [place] [address]  Phone: [phone]  Fax: [phone]  Follow Up Time: 1-3 Days    Amadou Tomas  Neurology  [address] [place] [address]  Phone: [phone]  Fax: [phone]  Follow Up Time: 1-3 Days

## 2025-03-10 NOTE — ED PROVIDER NOTE - NSICDXPASTSURGICALHX_GEN_ALL_CORE_FT
PAST SURGICAL HISTORY:  Bartholin cyst in november, had drainage at [facility]    History of repair of ACL

## 2025-03-10 NOTE — ED ADULT NURSE NOTE - NS TRANSFER PATIENT BELONGINGS
ring, earrings, mini backpack/Cell Phone/PDA (specify)/Jewelry/Money (specify)/Other belongings/Clothing

## 2025-03-10 NOTE — ED PROVIDER NOTE - CARE PROVIDERS DIRECT ADDRESSES
,DirectAddress_Unknown,DirectAddress_Unknown,[email or web address]

## 2025-03-10 NOTE — ED ADULT NURSE NOTE - OBJECTIVE STATEMENT
pt arrives to ED with c/o left sided chest pain that radiates to left arm, then to fingers and to left foot. pt reports these symptoms have been going on for years, but worse over last month, associated with dizziness, headaches, SOB. pt states shes gone to [facility] 4x in the past and told its anxiety but never followed up with cardiologist.

## 2025-03-10 NOTE — ED PROVIDER NOTE - NSFOLLOWUPINSTRUCTIONS_ED_ALL_ED_FT
Tylenol or Motrin over-the-counter for pain discomfort  Have close follow-up with cardiology and neurology.  Referral provided if needed.  Call to make appointment.  Referral coordinator will also contact you to help arrange outpatient follow-up      Chest Pain    WHAT YOU NEED TO KNOW:    Chest pain can be caused by a range of conditions, from not serious to life-threatening. Chest pain can be a symptom of a digestive problem, such as acid reflux or a stomach ulcer. An anxiety attack or a strong emotion, such as anger, can also cause chest pain. Infection, inflammation, or a fracture in the bones or cartilage in your chest can cause pain or discomfort. Sometimes chest pain or pressure is caused by poor blood flow to your heart (angina). Chest pain may also be caused by life-threatening conditions such as a heart attack or blood clot in your lungs.    DISCHARGE INSTRUCTIONS:    Call your local emergency number ([phone] in the ) or have someone call if:    You have any of the following signs of a heart attack:  Squeezing, pressure, or pain in your chest    You may also have any of the following:  Discomfort or pain in your back, neck, jaw, stomach, or arm    Shortness of breath    Nausea or vomiting    Lightheadedness or a sudden cold sweat    Seek care immediately if:    You have chest discomfort that gets worse, even with medicine.    You cough or vomit blood.    Your bowel movements are black or bloody.    You cannot stop vomiting, or it hurts to swallow.  Call your doctor if:    You have questions or concerns about your condition or care.    Medicines:    Medicines may be given to treat the cause of your chest pain. Examples include pain medicine, anxiety medicine, or medicines to increase blood flow to your heart.    Do not take certain medicines without asking your healthcare provider first. These include NSAIDs, herbal or vitamin supplements, and hormones, such as estrogen or progestin.    Take your medicine as directed. Contact your healthcare provider if you think your medicine is not helping or if you have side effects. Tell your provider if you are allergic to any medicine. Keep a list of the medicines, vitamins, and herbs you take. Include the amounts, and when and why you take them. Bring the list or the pill bottles to follow-up visits. Carry your medicine list with you in case of an emergency.  Healthy living tips: If the cause of your chest pain is known, your healthcare provider will give you specific guidelines to follow. The following are general healthy guidelines:    Do not smoke. Nicotine and other chemicals in cigarettes and cigars can cause lung and heart damage. Ask your healthcare provider for information if you currently smoke and need help to quit. E-cigarettes or smokeless tobacco still contain nicotine. Talk to your healthcare provider before you use these products.    Choose a variety of healthy foods as often as possible. Include fresh, frozen, or canned fruits and vegetables. Also include low-fat dairy products, fish, chicken (without skin), and lean meats. Your healthcare provider or a dietitian can help you create meal plans. You may need to avoid certain foods or drinks if your pain is caused by a digestion problem.  Healthy Foods      Lower your sodium (salt) intake. Limit foods that are high in sodium, such as canned foods, salty snacks, and cold cuts. If you add salt when you cook food, do not add more at the table. Choose low-sodium canned foods as much as possible.        Drink plenty of water every day. Water helps your body to control temperature and blood pressure. Ask your healthcare provider how much water you should drink every day.    Ask about activity. Your healthcare provider will tell you which activities to limit or avoid. Ask when you can drive, return to work, and have sex. Ask about the best exercise plan for you.    Maintain a healthy weight. Ask your healthcare provider what a healthy weight is for you. Ask him or her to help you create a safe weight loss plan if you are overweight.    Ask about vaccines you may need. Your healthcare provider can tell you which vaccines you need, and when to get them. The following vaccines help prevent diseases that can become serious for a person with a heart condition:  The influenza (flu) vaccine is given each year. Get a flu vaccine as soon as recommended, usually in September or October.    The pneumonia vaccine is usually given every 5 years. Your healthcare provider may recommend the pneumonia vaccine if you are 65 or older.    COVID-19 vaccines are given to adults as a shot. At least 1 dose of an updated vaccine is recommended for all adults. COVID-19 vaccines are updated throughout the year. Adults 65 or older need a second dose of updated vaccine at least 4 months after the first dose. Your healthcare provider can help you schedule all needed doses as updated vaccines become available.  Prevent Heart Disease   Follow up with your doctor within 72 hours, or as directed: You may need to return for more tests to find the cause of your chest pain. You may be referred to a specialist, such as a cardiologist or gastroenterologist. Write down your questions so you remember to ask them during your visits.

## 2025-03-10 NOTE — ED PROVIDER NOTE - CLINICAL SUMMARY MEDICAL DECISION MAKING FREE TEXT BOX
39-year-old female with history of hypertension, PCOS, anxiety, and H pylori presents with chest pain since yesterday.  Patient also reports some intermittent headaches and dizziness.  Patient reports some tingling to left arm and fingertips.  Slight occasional nausea.  No vomiting.  Denies any injury or trauma.  Patient reports similar symptoms in the past for several years, worse the past several months.  States she has been seen multiple times in emergency room for same complaint.  States she had cardiac workup and CTs of head in the past which were unremarkable.  States she was told symptoms likely related to anxiety.  Patient states she does not believe it is related to anxiety.  Patient has not been seen by neurology or cardiology for same complaint.  Patient was seen by primary care doctor and told to come to emergency room for further evaluation.  No history of DVT or PE.  No calf pain or swelling.  No recent travel.    VS stable  NAD  Heart sounds regular  Lungs CTA  Neuro intact  NIHSS zero    Will eval for ACS, anxiety, PE, PTX. Will get labs, EKG, CXR. Will reassess. Pt recommended to f/u with Cardio/neuro outpt for further testing if symptoms persist.

## 2025-03-10 NOTE — ED PROVIDER NOTE - DIFFERENTIAL DIAGNOSIS
Differentials include but limited to chest wall pain, costochondritis, pericarditis, myocarditis, ACS, anxiety reaction, pulmonary embolism, pneumothorax, infection Differential Diagnosis

## 2025-03-13 ENCOUNTER — EMERGENCY (EMERGENCY)
Facility: HOSPITAL | Age: 40
LOS: 1 days | Discharge: HOME | End: 2025-03-13
Attending: EMERGENCY MEDICINE | Admitting: EMERGENCY MEDICINE
Payer: MEDICAID

## 2025-03-13 VITALS
DIASTOLIC BLOOD PRESSURE: 84 MMHG | WEIGHT: 158.95 LBS | HEIGHT: 62 IN | RESPIRATION RATE: 18 BRPM | SYSTOLIC BLOOD PRESSURE: 147 MMHG | OXYGEN SATURATION: 100 % | HEART RATE: 65 BPM

## 2025-03-13 VITALS
HEART RATE: 66 BPM | DIASTOLIC BLOOD PRESSURE: 80 MMHG | OXYGEN SATURATION: 100 % | SYSTOLIC BLOOD PRESSURE: 122 MMHG | RESPIRATION RATE: 18 BRPM | TEMPERATURE: 98 F

## 2025-03-13 DIAGNOSIS — N75.0 CYST OF BARTHOLIN'S GLAND: Chronic | ICD-10-CM

## 2025-03-13 LAB
ALBUMIN SERPL ELPH-MCNC: 3.5 G/DL — SIGNIFICANT CHANGE UP (ref 3.3–5)
ALP SERPL-CCNC: 68 U/L — SIGNIFICANT CHANGE UP (ref 40–120)
ALT FLD-CCNC: 26 U/L — SIGNIFICANT CHANGE UP (ref 10–45)
ANION GAP SERPL CALC-SCNC: 9 MMOL/L — SIGNIFICANT CHANGE UP (ref 5–17)
AST SERPL-CCNC: 28 U/L — SIGNIFICANT CHANGE UP (ref 10–40)
BILIRUB SERPL-MCNC: 0.4 MG/DL — SIGNIFICANT CHANGE UP (ref 0.2–1.2)
BUN SERPL-MCNC: 18 MG/DL — SIGNIFICANT CHANGE UP (ref 7–23)
CALCIUM SERPL-MCNC: 9.4 MG/DL — SIGNIFICANT CHANGE UP (ref 8.4–10.5)
CHLORIDE SERPL-SCNC: 102 MMOL/L — SIGNIFICANT CHANGE UP (ref 96–108)
CO2 SERPL-SCNC: 26 MMOL/L — SIGNIFICANT CHANGE UP (ref 22–31)
CREAT SERPL-MCNC: 0.9 MG/DL — SIGNIFICANT CHANGE UP (ref 0.5–1.3)
EGFR: 83 ML/MIN/1.73M2 — SIGNIFICANT CHANGE UP
EGFR: 83 ML/MIN/1.73M2 — SIGNIFICANT CHANGE UP
GLUCOSE SERPL-MCNC: 92 MG/DL — SIGNIFICANT CHANGE UP (ref 70–99)
HCT VFR BLD CALC: 34.6 % — SIGNIFICANT CHANGE UP (ref 34.5–45)
HGB BLD-MCNC: 11.5 G/DL — SIGNIFICANT CHANGE UP (ref 11.5–15.5)
MCHC RBC-ENTMCNC: 29.8 PG — SIGNIFICANT CHANGE UP (ref 27–34)
MCHC RBC-ENTMCNC: 33.2 G/DL — SIGNIFICANT CHANGE UP (ref 32–36)
MCV RBC AUTO: 89.6 FL — SIGNIFICANT CHANGE UP (ref 80–100)
NRBC BLD AUTO-RTO: 0 /100 WBCS — SIGNIFICANT CHANGE UP (ref 0–0)
PLATELET # BLD AUTO: 236 K/UL — SIGNIFICANT CHANGE UP (ref 150–400)
POTASSIUM SERPL-MCNC: 4.5 MMOL/L — SIGNIFICANT CHANGE UP (ref 3.5–5.3)
POTASSIUM SERPL-SCNC: 4.5 MMOL/L — SIGNIFICANT CHANGE UP (ref 3.5–5.3)
PROT SERPL-MCNC: 7.6 G/DL — SIGNIFICANT CHANGE UP (ref 6–8.3)
RBC # BLD: 3.86 M/UL — SIGNIFICANT CHANGE UP (ref 3.8–5.2)
RBC # FLD: 12.5 % — SIGNIFICANT CHANGE UP (ref 10.3–14.5)
SODIUM SERPL-SCNC: 137 MMOL/L — SIGNIFICANT CHANGE UP (ref 135–145)
TSH SERPL-MCNC: 0.94 UIU/ML — SIGNIFICANT CHANGE UP (ref 0.36–3.74)
WBC # BLD: 8.7 K/UL — SIGNIFICANT CHANGE UP (ref 3.8–10.5)
WBC # FLD AUTO: 8.7 K/UL — SIGNIFICANT CHANGE UP (ref 3.8–10.5)

## 2025-03-13 PROCEDURE — 96374 THER/PROPH/DIAG INJ IV PUSH: CPT

## 2025-03-13 PROCEDURE — 99285 EMERGENCY DEPT VISIT HI MDM: CPT

## 2025-03-13 PROCEDURE — 85027 COMPLETE CBC AUTOMATED: CPT

## 2025-03-13 PROCEDURE — 93005 ELECTROCARDIOGRAM TRACING: CPT

## 2025-03-13 PROCEDURE — 80053 COMPREHEN METABOLIC PANEL: CPT

## 2025-03-13 PROCEDURE — 96375 TX/PRO/DX INJ NEW DRUG ADDON: CPT

## 2025-03-13 PROCEDURE — 93010 ELECTROCARDIOGRAM REPORT: CPT

## 2025-03-13 PROCEDURE — 36415 COLL VENOUS BLD VENIPUNCTURE: CPT

## 2025-03-13 PROCEDURE — 70450 CT HEAD/BRAIN W/O DYE: CPT | Mod: MC

## 2025-03-13 PROCEDURE — 84443 ASSAY THYROID STIM HORMONE: CPT

## 2025-03-13 PROCEDURE — 99285 EMERGENCY DEPT VISIT HI MDM: CPT | Mod: 25

## 2025-03-13 PROCEDURE — 70450 CT HEAD/BRAIN W/O DYE: CPT | Mod: 26

## 2025-03-13 RX ORDER — METOCLOPRAMIDE HCL 10 MG
10 TABLET ORAL ONCE
Refills: 0 | Status: COMPLETED | OUTPATIENT
Start: 2025-03-13 | End: 2025-03-13

## 2025-03-13 RX ORDER — ACETAMINOPHEN 500 MG/5ML
1000 LIQUID (ML) ORAL ONCE
Refills: 0 | Status: COMPLETED | OUTPATIENT
Start: 2025-03-13 | End: 2025-03-13

## 2025-03-13 RX ADMIN — Medication 1000 MILLIGRAM(S): at 20:00

## 2025-03-13 RX ADMIN — Medication 10 MILLIGRAM(S): at 19:43

## 2025-03-13 RX ADMIN — Medication 400 MILLIGRAM(S): at 19:43

## 2025-03-13 NOTE — ED ADULT NURSE NOTE - NSICDXPASTSURGICALHX_GEN_ALL_CORE_FT
PAST SURGICAL HISTORY:  Bartholin cyst in november, had drainage at [facility]    History of repair of ACL

## 2025-03-13 NOTE — ED PROVIDER NOTE - NSICDXPASTSURGICALHX_GEN_ALL_CORE_FT
PAST SURGICAL HISTORY:  Bartholin cyst in november, had drainage at [facility]    History of repair of ACL

## 2025-03-13 NOTE — ED PROVIDER NOTE - OBJECTIVE STATEMENT
39-year-old female past medical history of hypertension PCOS anxiety and H. pylori presenting with numbness and tingling with chest pain and dizziness.  Patient has had similar symptoms over the last couple of years without ever finding other etiology.  Patient was seen 3 days ago at [facility] where she had extensive workup for similar symptoms.  States that today however she started having some numbness of her tongue as well which was concerning to her.  Her doctor sent her to [facility] for an MRI where she told they do not do those in the emergency department.  Patient has not been seen by specialist at this time.  There is no other differences in with regards to her chest pain dizziness or numbness except for tongue involvement today.

## 2025-03-13 NOTE — ED ADULT NURSE NOTE - CHIEF COMPLAINT QUOTE
Numbness of tounge finger and toes, Patient states she was seen @ [facility] and was supposed to follow up with cardiologist

## 2025-03-13 NOTE — ED ADULT TRIAGE NOTE - CHIEF COMPLAINT QUOTE
Numbness of tounge finger and toes, Patient states she was seen @ [facility] and was supposed to follow up with cardiologist

## 2025-03-13 NOTE — ED ADULT NURSE NOTE - NS ED NOTE ABUSE RESPONSE YN
Notified pharmacy of prescribing physician's name and we will proceed with the prior authorization   Yes

## 2025-03-13 NOTE — ED PROVIDER NOTE - CLINICAL SUMMARY MEDICAL DECISION MAKING FREE TEXT BOX
Patient presenting with paresthesias as well as some chest discomfort and dizziness.  Says similar episodes multiple times over the last couple of years and has never found an etiology.  Patient will need to follow-up with a neurologist for possible MRI or cardiologist.  Anxiety is also in differential.  She was just worked up for an ACS as well as PE and both were negative.  Her EKG here is unremarkable and unchanged.  Being that she does have a headache with new numbness I will have to look for a secondary headache although my suspicion is low.  Also check some basic labs for etiology of her paresthesias.  If I cannot find a diagnosis today I will refer her through our referral coordinator for neuro and/or cardiology follow-up

## 2025-03-13 NOTE — ED PROVIDER NOTE - PROGRESS NOTE DETAILS
Patient feeling better at this time.  All symptoms have resolved.  CT scan laboratory studies are nonactionable.  I will discharge patient at this time with referrals to the referral coordinator

## 2025-03-13 NOTE — ED ADULT NURSE NOTE - OBJECTIVE STATEMENT
Pt came in from home c/o numbness and tingling of tounge, fingers, and toes. Pt was advised to follow up with cardiology. Hx HTN, PCOS, anxiety.

## 2025-03-13 NOTE — ED PROVIDER NOTE - PATIENT PORTAL LINK FT
You can access the FollowMyHealth Patient Portal offered by [facility] by registering at the following website: [email or web address]. By joining [facility]’s FollowMyHealth portal, you will also be able to view your health information using other applications (apps) compatible with our system.

## 2025-03-20 ENCOUNTER — APPOINTMENT (OUTPATIENT)
Dept: OBGYN | Facility: CLINIC | Age: 40
End: 2025-03-20
Payer: MEDICAID

## 2025-03-20 VITALS
WEIGHT: 159 LBS | SYSTOLIC BLOOD PRESSURE: 104 MMHG | OXYGEN SATURATION: 98 % | BODY MASS INDEX: 29.26 KG/M2 | DIASTOLIC BLOOD PRESSURE: 70 MMHG | HEIGHT: 62 IN | HEART RATE: 95 BPM | TEMPERATURE: 97.9 F

## 2025-03-20 DIAGNOSIS — R39.9 UNSPECIFIED SYMPTOMS AND SIGNS INVOLVING THE GENITOURINARY SYSTEM: ICD-10-CM

## 2025-03-20 DIAGNOSIS — N76.0 ACUTE VAGINITIS: ICD-10-CM

## 2025-03-20 LAB
BILIRUB UR QL STRIP: NORMAL
CLARITY UR: CLEAR
COLLECTION METHOD: NORMAL
GLUCOSE UR-MCNC: NORMAL
HCG UR QL: 0.2 EU/DL
HGB UR QL STRIP.AUTO: NORMAL
KETONES UR-MCNC: NORMAL
LEUKOCYTE ESTERASE UR QL STRIP: NORMAL
NITRITE UR QL STRIP: NORMAL
PH UR STRIP: 6
PROT UR STRIP-MCNC: NORMAL
SP GR UR STRIP: 1.02

## 2025-03-20 PROCEDURE — 99214 OFFICE O/P EST MOD 30 MIN: CPT

## 2025-03-20 PROCEDURE — 99459 PELVIC EXAMINATION: CPT

## 2025-03-20 RX ORDER — NITROFURANTOIN (MONOHYDRATE/MACROCRYSTALS) 25; 75 MG/1; MG/1
100 CAPSULE ORAL
Qty: 14 | Refills: 0 | Status: ACTIVE | COMMUNITY
Start: 2025-03-20 | End: 1900-01-01

## 2025-03-21 ENCOUNTER — NON-APPOINTMENT (OUTPATIENT)
Age: 40
End: 2025-03-21

## 2025-03-22 ENCOUNTER — NON-APPOINTMENT (OUTPATIENT)
Age: 40
End: 2025-03-22

## 2025-03-24 ENCOUNTER — APPOINTMENT (OUTPATIENT)
Dept: NEUROLOGY | Facility: CLINIC | Age: 40
End: 2025-03-24
Payer: MEDICAID

## 2025-03-24 ENCOUNTER — NON-APPOINTMENT (OUTPATIENT)
Age: 40
End: 2025-03-24

## 2025-03-24 ENCOUNTER — APPOINTMENT (OUTPATIENT)
Dept: GASTROENTEROLOGY | Facility: CLINIC | Age: 40
End: 2025-03-24

## 2025-03-24 VITALS
HEIGHT: 62 IN | TEMPERATURE: 98.2 F | BODY MASS INDEX: 29.26 KG/M2 | WEIGHT: 159 LBS | DIASTOLIC BLOOD PRESSURE: 78 MMHG | OXYGEN SATURATION: 98 % | HEART RATE: 66 BPM | SYSTOLIC BLOOD PRESSURE: 124 MMHG

## 2025-03-24 VITALS
DIASTOLIC BLOOD PRESSURE: 78 MMHG | SYSTOLIC BLOOD PRESSURE: 124 MMHG | HEIGHT: 62 IN | WEIGHT: 159 LBS | BODY MASS INDEX: 29.26 KG/M2 | HEART RATE: 66 BPM | TEMPERATURE: 98.2 F | OXYGEN SATURATION: 98 %

## 2025-03-24 DIAGNOSIS — R20.2 PARESTHESIA OF SKIN: ICD-10-CM

## 2025-03-24 DIAGNOSIS — R51.9 HEADACHE, UNSPECIFIED: ICD-10-CM

## 2025-03-24 LAB — BACTERIA UR CULT: NORMAL

## 2025-03-24 PROCEDURE — G2211 COMPLEX E/M VISIT ADD ON: CPT | Mod: NC

## 2025-03-24 PROCEDURE — 99204 OFFICE O/P NEW MOD 45 MIN: CPT

## 2025-03-24 RX ORDER — AMOXICILLIN AND CLAVULANATE POTASSIUM 875; 125 MG/1; MG/1
875-125 TABLET, COATED ORAL
Refills: 0 | Status: ACTIVE | COMMUNITY

## 2025-03-24 RX ORDER — FLUCONAZOLE 150 MG/1
150 TABLET ORAL
Qty: 3 | Refills: 0 | Status: COMPLETED | COMMUNITY
Start: 2025-03-20 | End: 2025-03-24

## 2025-03-24 RX ORDER — RIZATRIPTAN BENZOATE 10 MG/1
10 TABLET, ORALLY DISINTEGRATING ORAL
Qty: 12 | Refills: 3 | Status: ACTIVE | COMMUNITY
Start: 2025-03-24 | End: 1900-01-01

## 2025-03-24 RX ORDER — METRONIDAZOLE 7.5 MG/G
0.75 GEL VAGINAL
Qty: 1 | Refills: 0 | Status: ACTIVE | COMMUNITY
Start: 2025-03-24 | End: 1900-01-01

## 2025-03-27 LAB
CANDIDA VAG CYTO: DETECTED
G VAGINALIS+PREV SP MTYP VAG QL MICRO: DETECTED
T VAGINALIS VAG QL WET PREP: NOT DETECTED

## 2025-03-31 ENCOUNTER — OUTPATIENT (OUTPATIENT)
Dept: OUTPATIENT SERVICES | Facility: HOSPITAL | Age: 40
LOS: 1 days | End: 2025-03-31
Payer: MEDICAID

## 2025-03-31 ENCOUNTER — APPOINTMENT (OUTPATIENT)
Dept: MRI IMAGING | Facility: HOSPITAL | Age: 40
End: 2025-03-31
Payer: MEDICAID

## 2025-03-31 ENCOUNTER — NON-APPOINTMENT (OUTPATIENT)
Age: 40
End: 2025-03-31

## 2025-03-31 DIAGNOSIS — R51.9 HEADACHE, UNSPECIFIED: ICD-10-CM

## 2025-03-31 DIAGNOSIS — R20.2 PARESTHESIA OF SKIN: ICD-10-CM

## 2025-03-31 DIAGNOSIS — N75.0 CYST OF BARTHOLIN'S GLAND: Chronic | ICD-10-CM

## 2025-03-31 PROCEDURE — 70551 MRI BRAIN STEM W/O DYE: CPT

## 2025-03-31 PROCEDURE — 70551 MRI BRAIN STEM W/O DYE: CPT | Mod: 26

## 2025-04-10 ENCOUNTER — APPOINTMENT (OUTPATIENT)
Dept: OBGYN | Facility: CLINIC | Age: 40
End: 2025-04-10
Payer: MEDICAID

## 2025-04-10 VITALS
OXYGEN SATURATION: 97 % | BODY MASS INDEX: 28.16 KG/M2 | TEMPERATURE: 97.6 F | DIASTOLIC BLOOD PRESSURE: 72 MMHG | WEIGHT: 153 LBS | SYSTOLIC BLOOD PRESSURE: 108 MMHG | HEIGHT: 62 IN | HEART RATE: 97 BPM

## 2025-04-10 DIAGNOSIS — B37.31 ACUTE CANDIDIASIS OF VULVA AND VAGINA: ICD-10-CM

## 2025-04-10 PROCEDURE — 99459 PELVIC EXAMINATION: CPT

## 2025-04-10 PROCEDURE — 99212 OFFICE O/P EST SF 10 MIN: CPT

## 2025-04-10 RX ORDER — TERCONAZOLE 8 MG/G
0.8 CREAM VAGINAL
Qty: 1 | Refills: 2 | Status: ACTIVE | COMMUNITY
Start: 2025-04-10 | End: 1900-01-01

## 2025-04-16 ENCOUNTER — NON-APPOINTMENT (OUTPATIENT)
Age: 40
End: 2025-04-16

## 2025-04-25 ENCOUNTER — APPOINTMENT (OUTPATIENT)
Dept: GASTROENTEROLOGY | Facility: CLINIC | Age: 40
End: 2025-04-25

## 2025-04-28 ENCOUNTER — APPOINTMENT (OUTPATIENT)
Dept: ORTHOPEDIC SURGERY | Facility: CLINIC | Age: 40
End: 2025-04-28

## 2025-04-28 VITALS — BODY MASS INDEX: 28.16 KG/M2 | HEIGHT: 62 IN | WEIGHT: 153 LBS

## 2025-05-02 ENCOUNTER — APPOINTMENT (OUTPATIENT)
Dept: INTERNAL MEDICINE | Facility: CLINIC | Age: 40
End: 2025-05-02

## 2025-05-12 ENCOUNTER — APPOINTMENT (OUTPATIENT)
Dept: NEUROLOGY | Facility: CLINIC | Age: 40
End: 2025-05-12

## 2025-05-19 ENCOUNTER — APPOINTMENT (OUTPATIENT)
Dept: OBGYN | Facility: CLINIC | Age: 40
End: 2025-05-19

## 2025-06-02 ENCOUNTER — APPOINTMENT (OUTPATIENT)
Dept: OBGYN | Facility: CLINIC | Age: 40
End: 2025-06-02

## 2025-06-03 ENCOUNTER — APPOINTMENT (OUTPATIENT)
Dept: OBGYN | Facility: CLINIC | Age: 40
End: 2025-06-03
Payer: MEDICAID

## 2025-06-03 ENCOUNTER — APPOINTMENT (OUTPATIENT)
Dept: INTERNAL MEDICINE | Facility: CLINIC | Age: 40
End: 2025-06-03
Payer: MEDICAID

## 2025-06-03 VITALS
TEMPERATURE: 97.1 F | HEIGHT: 62 IN | BODY MASS INDEX: 28.34 KG/M2 | SYSTOLIC BLOOD PRESSURE: 120 MMHG | HEART RATE: 70 BPM | WEIGHT: 154 LBS | DIASTOLIC BLOOD PRESSURE: 80 MMHG | OXYGEN SATURATION: 99 % | RESPIRATION RATE: 16 BRPM

## 2025-06-03 VITALS
OXYGEN SATURATION: 99 % | HEART RATE: 90 BPM | BODY MASS INDEX: 28.34 KG/M2 | DIASTOLIC BLOOD PRESSURE: 68 MMHG | WEIGHT: 154 LBS | TEMPERATURE: 97 F | SYSTOLIC BLOOD PRESSURE: 110 MMHG | HEIGHT: 62 IN

## 2025-06-03 DIAGNOSIS — Z00.00 ENCOUNTER FOR GENERAL ADULT MEDICAL EXAMINATION W/OUT ABNORMAL FINDINGS: ICD-10-CM

## 2025-06-03 DIAGNOSIS — E55.9 VITAMIN D DEFICIENCY, UNSPECIFIED: ICD-10-CM

## 2025-06-03 DIAGNOSIS — R20.2 PARESTHESIA OF SKIN: ICD-10-CM

## 2025-06-03 DIAGNOSIS — R51.9 HEADACHE, UNSPECIFIED: ICD-10-CM

## 2025-06-03 DIAGNOSIS — N76.0 ACUTE VAGINITIS: ICD-10-CM

## 2025-06-03 DIAGNOSIS — R53.83 OTHER FATIGUE: ICD-10-CM

## 2025-06-03 DIAGNOSIS — A04.8 OTHER SPECIFIED BACTERIAL INTESTINAL INFECTIONS: ICD-10-CM

## 2025-06-03 DIAGNOSIS — D72.820 LYMPHOCYTOSIS (SYMPTOMATIC): ICD-10-CM

## 2025-06-03 DIAGNOSIS — Z12.39 ENCOUNTER FOR OTHER SCREENING FOR MALIGNANT NEOPLASM OF BREAST: ICD-10-CM

## 2025-06-03 PROCEDURE — 99213 OFFICE O/P EST LOW 20 MIN: CPT

## 2025-06-03 PROCEDURE — 99214 OFFICE O/P EST MOD 30 MIN: CPT

## 2025-06-03 PROCEDURE — 99459 PELVIC EXAMINATION: CPT

## 2025-06-03 RX ORDER — FLUCONAZOLE 150 MG/1
150 TABLET ORAL
Qty: 3 | Refills: 0 | Status: ACTIVE | COMMUNITY
Start: 2025-06-03 | End: 1900-01-01

## 2025-06-04 LAB
BILIRUB UR QL STRIP: NEGATIVE
COLLECTION METHOD: NORMAL
GLUCOSE UR-MCNC: NEGATIVE
HCG UR QL: 1 EU/DL
HGB UR QL STRIP.AUTO: NEGATIVE
KETONES UR-MCNC: NORMAL
LEUKOCYTE ESTERASE UR QL STRIP: NORMAL
NITRITE UR QL STRIP: NEGATIVE
PH UR STRIP: 7
PROT UR STRIP-MCNC: NEGATIVE
SP GR UR STRIP: 1.01

## 2025-06-05 ENCOUNTER — APPOINTMENT (OUTPATIENT)
Dept: ORTHOPEDIC SURGERY | Facility: CLINIC | Age: 40
End: 2025-06-05
Payer: MEDICAID

## 2025-06-05 VITALS — BODY MASS INDEX: 28.34 KG/M2 | WEIGHT: 154 LBS | HEIGHT: 62 IN

## 2025-06-05 PROBLEM — M25.511 ACUTE PAIN OF RIGHT SHOULDER: Status: ACTIVE | Noted: 2025-06-05

## 2025-06-05 LAB — BACTERIA UR CULT: NORMAL

## 2025-06-05 PROCEDURE — 73564 X-RAY EXAM KNEE 4 OR MORE: CPT | Mod: RT

## 2025-06-05 PROCEDURE — 99213 OFFICE O/P EST LOW 20 MIN: CPT | Mod: 25

## 2025-06-09 LAB
A VAGINAE DNA VAG QL NAA+PROBE: NORMAL
BVAB2 DNA VAG QL NAA+PROBE: NORMAL
C KRUSEI DNA VAG QL NAA+PROBE: NEGATIVE
C KRUSEI DNA VAG QL NAA+PROBE: POSITIVE
C TRACH RRNA SPEC QL NAA+PROBE: NEGATIVE
CANDIDA DNA VAG QL NAA+PROBE: NEGATIVE
MEGA1 DNA VAG QL NAA+PROBE: NORMAL
N GONORRHOEA RRNA SPEC QL NAA+PROBE: NEGATIVE
T VAGINALIS RRNA SPEC QL NAA+PROBE: NEGATIVE

## 2025-06-24 ENCOUNTER — EMERGENCY (EMERGENCY)
Facility: HOSPITAL | Age: 40
LOS: 1 days | End: 2025-06-24
Attending: STUDENT IN AN ORGANIZED HEALTH CARE EDUCATION/TRAINING PROGRAM | Admitting: STUDENT IN AN ORGANIZED HEALTH CARE EDUCATION/TRAINING PROGRAM
Payer: MEDICAID

## 2025-06-24 VITALS
TEMPERATURE: 99 F | DIASTOLIC BLOOD PRESSURE: 69 MMHG | SYSTOLIC BLOOD PRESSURE: 121 MMHG | WEIGHT: 153 LBS | HEART RATE: 85 BPM | HEIGHT: 62 IN | OXYGEN SATURATION: 98 % | RESPIRATION RATE: 18 BRPM

## 2025-06-24 DIAGNOSIS — N75.0 CYST OF BARTHOLIN'S GLAND: Chronic | ICD-10-CM

## 2025-06-24 LAB
ALBUMIN SERPL ELPH-MCNC: 3.3 G/DL — SIGNIFICANT CHANGE UP (ref 3.3–5)
ALP SERPL-CCNC: 78 U/L — SIGNIFICANT CHANGE UP (ref 40–120)
ALT FLD-CCNC: 18 U/L — SIGNIFICANT CHANGE UP (ref 10–45)
ANION GAP SERPL CALC-SCNC: 10 MMOL/L — SIGNIFICANT CHANGE UP (ref 5–17)
APPEARANCE UR: CLEAR — SIGNIFICANT CHANGE UP
AST SERPL-CCNC: 20 U/L — SIGNIFICANT CHANGE UP (ref 10–40)
BASOPHILS # BLD AUTO: 0.04 K/UL — SIGNIFICANT CHANGE UP (ref 0–0.2)
BASOPHILS NFR BLD AUTO: 0.4 % — SIGNIFICANT CHANGE UP (ref 0–2)
BILIRUB SERPL-MCNC: 0.2 MG/DL — SIGNIFICANT CHANGE UP (ref 0.2–1.2)
BILIRUB UR-MCNC: NEGATIVE — SIGNIFICANT CHANGE UP
BUN SERPL-MCNC: 11 MG/DL — SIGNIFICANT CHANGE UP (ref 7–23)
CALCIUM SERPL-MCNC: 8.9 MG/DL — SIGNIFICANT CHANGE UP (ref 8.4–10.5)
CHLORIDE SERPL-SCNC: 109 MMOL/L — HIGH (ref 96–108)
CO2 SERPL-SCNC: 25 MMOL/L — SIGNIFICANT CHANGE UP (ref 22–31)
COLOR SPEC: YELLOW — SIGNIFICANT CHANGE UP
CREAT SERPL-MCNC: 0.94 MG/DL — SIGNIFICANT CHANGE UP (ref 0.5–1.3)
DIFF PNL FLD: NEGATIVE — SIGNIFICANT CHANGE UP
EGFR: 78 ML/MIN/1.73M2 — SIGNIFICANT CHANGE UP
EGFR: 78 ML/MIN/1.73M2 — SIGNIFICANT CHANGE UP
EOSINOPHIL # BLD AUTO: 0.17 K/UL — SIGNIFICANT CHANGE UP (ref 0–0.5)
EOSINOPHIL NFR BLD AUTO: 1.7 % — SIGNIFICANT CHANGE UP (ref 0–6)
GLUCOSE SERPL-MCNC: 105 MG/DL — HIGH (ref 70–99)
GLUCOSE UR QL: NEGATIVE MG/DL — SIGNIFICANT CHANGE UP
HCG SERPL-ACNC: <1 MIU/ML — SIGNIFICANT CHANGE UP
HCT VFR BLD CALC: 33.9 % — LOW (ref 34.5–45)
HGB BLD-MCNC: 11.2 G/DL — LOW (ref 11.5–15.5)
IMM GRANULOCYTES NFR BLD AUTO: 0.2 % — SIGNIFICANT CHANGE UP (ref 0–0.9)
KETONES UR QL: ABNORMAL MG/DL
LEUKOCYTE ESTERASE UR-ACNC: NEGATIVE — SIGNIFICANT CHANGE UP
LIDOCAIN IGE QN: 43 U/L — SIGNIFICANT CHANGE UP (ref 16–77)
LYMPHOCYTES # BLD AUTO: 3.9 K/UL — HIGH (ref 1–3.3)
LYMPHOCYTES # BLD AUTO: 39.5 % — SIGNIFICANT CHANGE UP (ref 13–44)
MCHC RBC-ENTMCNC: 30.1 PG — SIGNIFICANT CHANGE UP (ref 27–34)
MCHC RBC-ENTMCNC: 33 G/DL — SIGNIFICANT CHANGE UP (ref 32–36)
MCV RBC AUTO: 91.1 FL — SIGNIFICANT CHANGE UP (ref 80–100)
MONOCYTES # BLD AUTO: 0.83 K/UL — SIGNIFICANT CHANGE UP (ref 0–0.9)
MONOCYTES NFR BLD AUTO: 8.4 % — SIGNIFICANT CHANGE UP (ref 2–14)
NEUTROPHILS # BLD AUTO: 4.92 K/UL — SIGNIFICANT CHANGE UP (ref 1.8–7.4)
NEUTROPHILS NFR BLD AUTO: 49.8 % — SIGNIFICANT CHANGE UP (ref 43–77)
NITRITE UR-MCNC: NEGATIVE — SIGNIFICANT CHANGE UP
NRBC BLD AUTO-RTO: 0 /100 WBCS — SIGNIFICANT CHANGE UP (ref 0–0)
PH UR: 7.5 — SIGNIFICANT CHANGE UP (ref 5–8)
PLATELET # BLD AUTO: 270 K/UL — SIGNIFICANT CHANGE UP (ref 150–400)
POTASSIUM SERPL-MCNC: 3.8 MMOL/L — SIGNIFICANT CHANGE UP (ref 3.5–5.3)
POTASSIUM SERPL-SCNC: 3.8 MMOL/L — SIGNIFICANT CHANGE UP (ref 3.5–5.3)
PROT SERPL-MCNC: 7.1 G/DL — SIGNIFICANT CHANGE UP (ref 6–8.3)
PROT UR-MCNC: SIGNIFICANT CHANGE UP MG/DL
RBC # BLD: 3.72 M/UL — LOW (ref 3.8–5.2)
RBC # FLD: 13.2 % — SIGNIFICANT CHANGE UP (ref 10.3–14.5)
SODIUM SERPL-SCNC: 144 MMOL/L — SIGNIFICANT CHANGE UP (ref 135–145)
SP GR SPEC: 1.03 — SIGNIFICANT CHANGE UP (ref 1–1.03)
UROBILINOGEN FLD QL: 1 MG/DL — SIGNIFICANT CHANGE UP (ref 0.2–1)
WBC # BLD: 9.88 K/UL — SIGNIFICANT CHANGE UP (ref 3.8–10.5)
WBC # FLD AUTO: 9.88 K/UL — SIGNIFICANT CHANGE UP (ref 3.8–10.5)

## 2025-06-24 PROCEDURE — 87086 URINE CULTURE/COLONY COUNT: CPT

## 2025-06-24 PROCEDURE — 83690 ASSAY OF LIPASE: CPT

## 2025-06-24 PROCEDURE — 36415 COLL VENOUS BLD VENIPUNCTURE: CPT

## 2025-06-24 PROCEDURE — 96374 THER/PROPH/DIAG INJ IV PUSH: CPT | Mod: XU

## 2025-06-24 PROCEDURE — 74177 CT ABD & PELVIS W/CONTRAST: CPT

## 2025-06-24 PROCEDURE — 84702 CHORIONIC GONADOTROPIN TEST: CPT

## 2025-06-24 PROCEDURE — 81003 URINALYSIS AUTO W/O SCOPE: CPT

## 2025-06-24 PROCEDURE — 85025 COMPLETE CBC W/AUTO DIFF WBC: CPT

## 2025-06-24 PROCEDURE — 99284 EMERGENCY DEPT VISIT MOD MDM: CPT | Mod: 25

## 2025-06-24 PROCEDURE — 74177 CT ABD & PELVIS W/CONTRAST: CPT | Mod: 26

## 2025-06-24 PROCEDURE — 99285 EMERGENCY DEPT VISIT HI MDM: CPT

## 2025-06-24 PROCEDURE — 96375 TX/PRO/DX INJ NEW DRUG ADDON: CPT

## 2025-06-24 PROCEDURE — 80053 COMPREHEN METABOLIC PANEL: CPT

## 2025-06-24 RX ORDER — KETOROLAC TROMETHAMINE 30 MG/ML
15 INJECTION, SOLUTION INTRAMUSCULAR; INTRAVENOUS ONCE
Refills: 0 | Status: DISCONTINUED | OUTPATIENT
Start: 2025-06-24 | End: 2025-06-24

## 2025-06-24 RX ORDER — ONDANSETRON HCL/PF 4 MG/2 ML
4 VIAL (ML) INJECTION ONCE
Refills: 0 | Status: COMPLETED | OUTPATIENT
Start: 2025-06-24 | End: 2025-06-24

## 2025-06-24 RX ADMIN — KETOROLAC TROMETHAMINE 15 MILLIGRAM(S): 30 INJECTION, SOLUTION INTRAMUSCULAR; INTRAVENOUS at 21:22

## 2025-06-24 RX ADMIN — Medication 4 MILLIGRAM(S): at 21:20

## 2025-06-24 RX ADMIN — Medication 20 MILLIGRAM(S): at 21:20

## 2025-06-24 RX ADMIN — Medication 1000 MILLILITER(S): at 21:19

## 2025-06-24 NOTE — ED PROVIDER NOTE - PRO INTERPRETER NEED 2
HPI:        RUQ US Distended urinary bladder and right-sided hydronephrosis.  Rt Hip xray unremarkable, CXR neg.       PAST MEDICAL & SURGICAL HISTORY:  Syncope 2013  HTN (hypertension)  Hyperlipidemia  Afib  Bronchitis  Cardiac abnormality internal cardiac monitor placed 2013  H/O: hysterectomy  S/P cataract surgery Left eye.  s/p RT PADDY 23 for hip fracture d/t fall      Review of Systems:   CONSTITUTIONAL: No fever.  EYES: No eye pain or discharge.  ENMT:  No sinus or throat pain  NECK: No pain or stiffness  RESPIRATORY: No cough, wheezing, chills or hemoptysis; No shortness of breath  CARDIOVASCULAR: No chest pain, palpitations, dizziness, or leg swelling  GASTROINTESTINAL: ++ epigastric pain. ++ nausea, ++vomiting, NO  hematemesis; No diarrhea or constipation. No melena or hematochezia.  GENITOURINARY: No dysuria or incontinence  NEUROLOGICAL: No headaches, memory loss, loss of strength, numbness, or tremors  SKIN: No rashes.  MUSCULOSKELETAL: ++Rt hip  joint pain ++RLE swelling  PSYCHIATRIC: No depression, anxiety, mood swings, or difficulty sleeping    Social History:   denies smoking/etoh,   ind ADLs, now requires assist    FAMILY HISTORY:  Family history of primary TB,  CVA,  diabetes mellitus        MEDICATIONS  (STANDING):  diltiazem    milliGRAM(s) Oral daily  ketorolac   Injectable 30 milliGRAM(s) IV Push every 8 hours  metoprolol tartrate 37.5 milliGRAM(s) Oral two times a day  pantoprazole  Injectable 40 milliGRAM(s) IV Push two times a day  polyethylene glycol 3350 17 Gram(s) Oral at bedtime  rivaroxaban 20 milliGRAM(s) Oral with dinner  simvastatin 20 milliGRAM(s) Oral at bedtime    MEDICATIONS  (PRN):  acetaminophen     Tablet .. 650 milliGRAM(s) Oral every 6 hours PRN Temp greater or equal to 38C (100.4F), Mild Pain (1 - 3)  aluminum hydroxide/magnesium hydroxide/simethicone Suspension 30 milliLiter(s) Oral every 4 hours PRN Dyspepsia  HYDROmorphone   Tablet 2 milliGRAM(s) Oral every 4 hours PRN Severe Pain (7 - 10)  melatonin 3 milliGRAM(s) Oral at bedtime PRN Insomnia  ondansetron Injectable 4 milliGRAM(s) IV Push every 8 hours PRN Nausea and/or Vomiting  traMADol 50 milliGRAM(s) Oral every 6 hours PRN Moderate Pain (4 - 6)      I&O's Summary    2023 07:01  -  2023 15:16  --------------------------------------------------------  IN: 0 mL / OUT: 1800 mL / NET: -1800 mL        PHYSICAL EXAM:  Vital Signs Last 24 Hrs  T(C): 36.5 (2023 15:09), Max: 36.6 (2023 09:57)  T(F): 97.7 (2023 15:09), Max: 97.9 (2023 09:57)  HR: 85 (2023 15:09) (71 - 107)  BP: 117/74 (2023 15:09) (113/60 - 144/91)  BP(mean): 88 (2023 15:09) (88 - 88)  RR: 17 (2023 15:09) (17 - 20)  SpO2: 99% (2023 15:09) (99% - 100%)  Parameters below as of 2023 15:09  Patient On (Oxygen Delivery Method): room air  GENERAL: NAD, mild confusion, slowed speech  HEAD:  Atraumatic, Normocephalic  EYES: EOMI, PERRLA, conjunctiva and sclera clear  ENT: normal hearing, no nasal discharge, throat clear, dentition normal ++dry mucosa  NECK: Supple, No JVD, no LAD, no thyromegaly   CHEST/LUNG: Clear to auscultation bilaterally; No wheeze, respirations unlabored  HEART:  ++ IRREG IRREG, No murmur  ABDOMEN: Soft, ++EPIGASTRIC TTP, MILD DISTENSION,  Bowel sounds Hypoactive,  no HSM  EXTREMITIES:  2+ Peripheral Pulses, No clubbing, cyanosis, ++RLE edema (post surgical)>LLE  PSYCH: AAOx3, normal behavior but mild confusion  NEUROLOGY: non-focal, sensory and cn 2-12 intact, speech grossly intact/language intact  SKIN: No visible rashes or lesions    LABS:                        12.5   20.86 )-----------( 544      ( 2023 10:12 )             35.0     06-08    113<LL>  |  81<L>  |  19  ----------------------------<  106<H>  5.1   |  26  |  0.63    Ca    8.7      2023 14:36    TPro  6.8  /  Alb  2.8<L>  /  TBili  0.9  /  DBili  x   /  AST  35  /  ALT  36  /  AlkPhos  106  06-08    PT/INR - ( 2023 10:12 )   PT: 23.9 sec;   INR: 2.05 ratio         PTT - ( 2023 10:12 )  PTT:32.2 sec      Urinalysis Basic - ( 2023 14:23 )    Color: Yellow / Appearance: Clear / S.010 / pH: x  Gluc: x / Ketone: Negative  / Bili: Negative / Urobili: Negative   Blood: x / Protein: Negative / Nitrite: Negative   Leuk Esterase: Trace / RBC: x / WBC x   Sq Epi: x / Non Sq Epi: x / Bacteria: x        RADIOLOGY & ADDITIONAL TESTS:    Imaging Personally Reviewed:  RUQ US Distended urinary bladder and right-sided hydronephrosis.  Rt Hip xray unremarkable, CXR neg.     EKG Personally Reviewed:  afib  Consultant(s) Notes Reviewed:    ortho  Care Discussed with Consultants/Other Providers:   HPI:  85F w/PMH afib on xarelto, HTN, HLD, recent admit - for Rt hip fracture after fall s/p R PADDY on , d/c'd to [facility], from which she presents now when she was found in Afibrvr.  Pt is awake but slowed speech and at times seems mildly confused.  Pt's son, Juwan, at bedside and states she's been like this since surgery.  Son endorses that pt has not been eating much and pt agrees that she either has nausea or vomits PO intake and has had dec PO intake and c/o epigastric pain.  Pt had been continued on po dilaudid at [facility].     In ED, Na 114, started on NS, wbc 20, platelets 544, afibrvr- now cvr, cxr neg, hip xray neg, eval by ortho, ICU (notes reviewed), Urinary retention- desai placed in ED, UA sent  RUQ US Distended urinary bladder and right-sided hydronephrosis.  +MOLST DNR/DNI - confirmed     PAST MEDICAL & SURGICAL HISTORY:  Syncope 2013  HTN (hypertension)  Hyperlipidemia  Afib  Bronchitis  Cardiac abnormality internal cardiac monitor placed 2013  H/O: hysterectomy  S/P cataract surgery Left eye.  s/p RT PADDY 23 for hip fracture d/t fall      Review of Systems:   CONSTITUTIONAL: No fever.  EYES: No eye pain or discharge.  ENMT:  No sinus or throat pain  NECK: No pain or stiffness  RESPIRATORY: No cough, wheezing, chills or hemoptysis; No shortness of breath  CARDIOVASCULAR: No chest pain, palpitations, dizziness, or leg swelling  GASTROINTESTINAL: ++ epigastric pain. ++ nausea, ++vomiting, NO  hematemesis; No diarrhea or constipation. No melena or hematochezia.  GENITOURINARY: No dysuria or incontinence  NEUROLOGICAL: No headaches, memory loss, loss of strength, numbness, or tremors  SKIN: No rashes.  MUSCULOSKELETAL: ++Rt hip  joint pain ++RLE swelling  PSYCHIATRIC: No depression, anxiety, mood swings, or difficulty sleeping    Social History:   denies smoking/etoh,   ind ADLs, now requires assist    FAMILY HISTORY:  Family history of primary TB,  CVA,  diabetes mellitus        MEDICATIONS  (STANDING):  diltiazem    milliGRAM(s) Oral daily  ketorolac   Injectable 30 milliGRAM(s) IV Push every 8 hours  metoprolol tartrate 37.5 milliGRAM(s) Oral two times a day  pantoprazole  Injectable 40 milliGRAM(s) IV Push two times a day  polyethylene glycol 3350 17 Gram(s) Oral at bedtime  rivaroxaban 20 milliGRAM(s) Oral with dinner  simvastatin 20 milliGRAM(s) Oral at bedtime    MEDICATIONS  (PRN):  acetaminophen     Tablet .. 650 milliGRAM(s) Oral every 6 hours PRN Temp greater or equal to 38C (100.4F), Mild Pain (1 - 3)  aluminum hydroxide/magnesium hydroxide/simethicone Suspension 30 milliLiter(s) Oral every 4 hours PRN Dyspepsia  HYDROmorphone   Tablet 2 milliGRAM(s) Oral every 4 hours PRN Severe Pain (7 - 10)  melatonin 3 milliGRAM(s) Oral at bedtime PRN Insomnia  ondansetron Injectable 4 milliGRAM(s) IV Push every 8 hours PRN Nausea and/or Vomiting  traMADol 50 milliGRAM(s) Oral every 6 hours PRN Moderate Pain (4 - 6)      I&O's Summary    2023 07:01  -  2023 15:16  --------------------------------------------------------  IN: 0 mL / OUT: 1800 mL / NET: -1800 mL        PHYSICAL EXAM:  Vital Signs Last 24 Hrs  T(C): 36.5 (2023 15:09), Max: 36.6 (2023 09:57)  T(F): 97.7 (2023 15:09), Max: 97.9 (2023 09:57)  HR: 85 (2023 15:09) (71 - 107)  BP: 117/74 (2023 15:09) (113/60 - 144/91)  BP(mean): 88 (2023 15:09) (88 - 88)  RR: 17 (2023 15:09) (17 - 20)  SpO2: 99% (2023 15:09) (99% - 100%)  Parameters below as of 2023 15:09  Patient On (Oxygen Delivery Method): room air  GENERAL: NAD, mild confusion, slowed speech  HEAD:  Atraumatic, Normocephalic  EYES: EOMI, PERRLA, conjunctiva and sclera clear  ENT: normal hearing, no nasal discharge, throat clear, dentition normal ++dry mucosa  NECK: Supple, No JVD, no LAD, no thyromegaly   CHEST/LUNG: Clear to auscultation bilaterally; No wheeze, respirations unlabored  HEART:  ++ IRREG IRREG, No murmur  ABDOMEN: Soft, ++EPIGASTRIC TTP, MILD DISTENSION,  Bowel sounds Hypoactive,  no HSM  EXTREMITIES:  2+ Peripheral Pulses, No clubbing, cyanosis, ++RLE edema (post surgical)>LLE  PSYCH: AAOx3, normal behavior but mild confusion  NEUROLOGY: non-focal, sensory and cn 2-12 intact, speech grossly intact/language intact  SKIN: No visible rashes or lesions    LABS:                        12.5   20.86 )-----------( 544      ( 2023 10:12 )             35.0     06-08    113<LL>  |  81<L>  |  19  ----------------------------<  106<H>  5.1   |  26  |  0.63    Ca    8.7      2023 14:36    TPro  6.8  /  Alb  2.8<L>  /  TBili  0.9  /  DBili  x   /  AST  35  /  ALT  36  /  AlkPhos  106  06-08    PT/INR - ( 2023 10:12 )   PT: 23.9 sec;   INR: 2.05 ratio         PTT - ( 2023 10:12 )  PTT:32.2 sec      Urinalysis Basic - ( 2023 14:23 )    Color: Yellow / Appearance: Clear / S.010 / pH: x  Gluc: x / Ketone: Negative  / Bili: Negative / Urobili: Negative   Blood: x / Protein: Negative / Nitrite: Negative   Leuk Esterase: Trace / RBC: x / WBC x   Sq Epi: x / Non Sq Epi: x / Bacteria: x        RADIOLOGY & ADDITIONAL TESTS:    Imaging Personally Reviewed:  RUQ US Distended urinary bladder and right-sided hydronephrosis.  Rt Hip xray unremarkable, CXR neg.     EKG Personally Reviewed:  afib  Consultant(s) Notes Reviewed:    ortho  Care Discussed with Consultants/Other Providers:   English

## 2025-06-24 NOTE — ED ADULT NURSE NOTE - OBJECTIVE STATEMENT
Pt is alert, came to the ER due to abdominal pain since yesterday with nausea. No fever , dysuria, or constipation, diarrhea. Had Fibroid surgery in the past.

## 2025-06-24 NOTE — ED PROVIDER NOTE - OBJECTIVE STATEMENT
39-year-old female with a history of H. pylori, fibroids status post surgery this year no other past surgical history or past medical history presents abdominal pain x 2 days.  Patient admits to constant suprapubic abdominal pain that worsened today with sharp pain.  With some mild nausea.  Denies hematuria dysuria vaginal discharge vaginal bleeding fevers chills diarrhea sick contacts.  Admits unsure if similar symptoms to her H. pylori versus fibroids.  Admits last menstrual period about 1 week ago

## 2025-06-24 NOTE — ED ADULT NURSE NOTE - NSICDXPASTSURGICALHX_GEN_ALL_CORE_FT
PAST SURGICAL HISTORY:  Bartholin cyst in november, had drainage at [facility]    History of repair of ACL

## 2025-06-24 NOTE — ED PROVIDER NOTE - PHYSICAL EXAMINATION
CONSTITUTIONAL: NAD  SKIN: Warm dry  HEAD: NCAT  EYES: NL inspection  ENT: MMM  CARD: RRR  RESP: Unlabored respirations  ABD: soft, sno R/G  EXT: no pedal edema  NEURO: Grossly unremarkable  PSYCH: Cooperative, appropriate.

## 2025-06-24 NOTE — ED PROVIDER NOTE - CLINICAL SUMMARY MEDICAL DECISION MAKING FREE TEXT BOX
labs, CT abdomen, meds.  pt signed out to Dr. Truong pending CT abdomen results and DC.    Nusrat Truong MD, ED Attending:  pt signed out to me by Dr. Escalona pending CT results  CT with fibroids, cholelithiasis  pt feels improved, already has obgyn she will follow up with  stable for dc at this time

## 2025-06-24 NOTE — ED PROVIDER NOTE - NSFOLLOWUPINSTRUCTIONS_ED_ALL_ED_FT
As discussed, please follow-up with your OB/GYN this week for your fibroids.  For your pain, can take NSAIDs such as Advil/Motrin/Aleve.  Return to ED if any new or worsening symptoms.    Uterine fibroids are growths in the uterus. Fibroids aren't cancer. Doctors don't know what causes fibroids. But they are more common as you age, especially from your 30s and 40s and through menopause. After menopause, fibroids often shrink and go away.    Fibroids can grow on the outer wall, on the inner wall, or inside the wall of the uterus. They can cause painful cramps and heavy periods. In these cases, an intrauterine device (IUD) can help decrease symptoms. It can also help to take anti-inflammatory medicines or hormone birth control. Sometimes surgery is needed to treat fibroids. But if you are near menopause, you may want to wait and see if your symptoms get better.    Follow-up care is a key part of your treatment and safety. Be sure to make and go to all appointments, and call your doctor or nurse advice line (263 in most [place] and [facility]) if you are having problems. It's also a good idea to know your test results and keep a list of the medicines you take.    How can you care for yourself at home?  If your doctor gave you medicine, take it as exactly as prescribed. Call your doctor or nurse advice line if you think you are having a problem with your medicine.  Take anti-inflammatory medicines for pain. These include ibuprofen and naproxen. Read and follow all instructions on the label.  Use heat, such as a hot water bottle or a heating pad set on low, or a warm bath to relax tense muscles and relieve cramping. Put a thin cloth between the heating pad and your skin. Never go to sleep with a heating pad on.  Lie down and put a pillow under your knees. Or lie on your side and bring your knees up to your chest. These positions may help relieve belly pain or pressure.  Keep track of how many sanitary pads or tampons you use each day.  Get at least 2½ hours of exercise a week. Walking is a good choice. You also may want to do other activities, such as running, swimming, cycling, or playing tennis or team sports.  If you bleed longer than usual or have heavy bleeding, take a daily multivitamin with iron.  When should you call for help?  	  Call your doctor or nurse advice line now or seek immediate medical care if:    You have severe vaginal bleeding.  You have new or worse belly or pelvic pain.  Watch closely for changes in your health, and be sure to contact your doctor or nurse advice line if:    You do not get better as expected.  You have unusual vaginal bleeding.

## 2025-06-24 NOTE — ED ADULT NURSE NOTE - NSSUHOSCREENINGYN_ED_ALL_ED
[FreeTextEntry3] : Cerumen removed with curette and suction from bilateral ears. After removal of cerumen canal noted to be normal without edema, purulence or inflammation. Patient tolerated procedure well.
Yes - the patient is able to be screened

## 2025-06-24 NOTE — ED PROVIDER NOTE - PATIENT PORTAL LINK FT
You can access the FollowMyHealth Patient Portal offered by [facility] by registering at the following website: [email or web address]. By joining [facility]’s FollowMyHealth portal, you will also be able to view your health information using other applications (apps) compatible with our system.

## 2025-06-25 ENCOUNTER — APPOINTMENT (OUTPATIENT)
Dept: OBGYN | Facility: CLINIC | Age: 40
End: 2025-06-25
Payer: MEDICAID

## 2025-06-25 VITALS
SYSTOLIC BLOOD PRESSURE: 122 MMHG | RESPIRATION RATE: 18 BRPM | HEART RATE: 81 BPM | OXYGEN SATURATION: 96 % | DIASTOLIC BLOOD PRESSURE: 75 MMHG

## 2025-06-25 VITALS
WEIGHT: 153 LBS | SYSTOLIC BLOOD PRESSURE: 122 MMHG | HEART RATE: 61 BPM | TEMPERATURE: 97.4 F | DIASTOLIC BLOOD PRESSURE: 72 MMHG | HEIGHT: 62 IN | OXYGEN SATURATION: 95 % | BODY MASS INDEX: 28.16 KG/M2

## 2025-06-25 LAB
HCG UR QL: NEGATIVE
QUALITY CONTROL: YES

## 2025-06-25 PROCEDURE — 99214 OFFICE O/P EST MOD 30 MIN: CPT

## 2025-06-25 PROCEDURE — 99459 PELVIC EXAMINATION: CPT

## 2025-06-25 RX ORDER — TERCONAZOLE 4 MG/G
0.4 CREAM VAGINAL DAILY
Qty: 1 | Refills: 0 | Status: ACTIVE | COMMUNITY
Start: 2025-06-25 | End: 1900-01-01

## 2025-06-26 ENCOUNTER — LABORATORY RESULT (OUTPATIENT)
Age: 40
End: 2025-06-26

## 2025-06-26 ENCOUNTER — APPOINTMENT (OUTPATIENT)
Dept: OBGYN | Facility: CLINIC | Age: 40
End: 2025-06-26
Payer: MEDICAID

## 2025-06-26 VITALS
HEIGHT: 62 IN | SYSTOLIC BLOOD PRESSURE: 118 MMHG | HEART RATE: 65 BPM | TEMPERATURE: 97.3 F | BODY MASS INDEX: 28.16 KG/M2 | WEIGHT: 153 LBS | OXYGEN SATURATION: 97 % | DIASTOLIC BLOOD PRESSURE: 70 MMHG

## 2025-06-26 PROBLEM — Z12.39 BREAST CANCER SCREENING: Status: ACTIVE | Noted: 2025-06-26

## 2025-06-26 PROBLEM — R92.30 DENSE BREASTS: Status: ACTIVE | Noted: 2025-06-26

## 2025-06-26 LAB
CULTURE RESULTS: SIGNIFICANT CHANGE UP
SPECIMEN SOURCE: SIGNIFICANT CHANGE UP

## 2025-06-26 PROCEDURE — 99459 PELVIC EXAMINATION: CPT

## 2025-06-26 PROCEDURE — 99396 PREV VISIT EST AGE 40-64: CPT

## 2025-06-27 ENCOUNTER — APPOINTMENT (OUTPATIENT)
Dept: INTERNAL MEDICINE | Facility: CLINIC | Age: 40
End: 2025-06-27
Payer: MEDICAID

## 2025-06-27 VITALS
SYSTOLIC BLOOD PRESSURE: 108 MMHG | RESPIRATION RATE: 16 BRPM | OXYGEN SATURATION: 99 % | HEART RATE: 76 BPM | DIASTOLIC BLOOD PRESSURE: 60 MMHG | WEIGHT: 155 LBS | HEIGHT: 62 IN | TEMPERATURE: 97 F | BODY MASS INDEX: 28.52 KG/M2

## 2025-06-27 PROBLEM — R10.30 ABDOMINAL WALL PAIN IN SUPRAPUBIC REGION: Status: ACTIVE | Noted: 2025-06-27

## 2025-06-27 LAB
24R-OH-CALCIDIOL SERPL-MCNC: 96.3 PG/ML
AMPHET UR-MCNC: NEGATIVE NG/ML
APPEARANCE: CLEAR
BARBITURATES UR-MCNC: NEGATIVE NG/ML
BASOPHILS # BLD AUTO: 0.03 K/UL
BASOPHILS NFR BLD AUTO: 0.5 %
BENZODIAZ UR-MCNC: NEGATIVE NG/ML
BILIRUBIN URINE: NEGATIVE
BLOOD URINE: NEGATIVE
COCAINE METAB.OTHER UR-MCNC: NEGATIVE NG/ML
COLOR: YELLOW
CREATININE, URINE: 104.6 MG/DL
DEPRECATED KAPPA LC FREE/LAMBDA SER: 1.27 RATIO
EOSINOPHIL # BLD AUTO: 0.09 K/UL
EOSINOPHIL NFR BLD AUTO: 1.6 %
FENTANYL, URINE: NEGATIVE NG/ML
FOLATE SERPL-MCNC: 12.8 NG/ML
GLUCOSE QUALITATIVE U: NEGATIVE MG/DL
HCT VFR BLD CALC: 37.3 %
HGB BLD-MCNC: 11.9 G/DL
HPV HIGH+LOW RISK DNA PNL CVX: NOT DETECTED
IGA SERPL-MCNC: 322 MG/DL
IGG SERPL-MCNC: 1034 MG/DL
IGM SERPL-MCNC: 91 MG/DL
IMM GRANULOCYTES NFR BLD AUTO: 0.3 %
KAPPA LC CSF-MCNC: 1.36 MG/DL
KAPPA LC SERPL-MCNC: 1.73 MG/DL
KETONES URINE: NEGATIVE MG/DL
LEUKOCYTE ESTERASE URINE: ABNORMAL
LYMPHOCYTES # BLD AUTO: 2.45 K/UL
LYMPHOCYTES NFR BLD AUTO: 42.6 %
MAN DIFF?: NORMAL
MCHC RBC-ENTMCNC: 29.8 PG
MCHC RBC-ENTMCNC: 31.9 G/DL
MCV RBC AUTO: 93.3 FL
METHADONE SCREEN, UR: NEGATIVE NG/ML
MONOCYTES # BLD AUTO: 0.44 K/UL
MONOCYTES NFR BLD AUTO: 7.7 %
NEUTROPHILS # BLD AUTO: 2.72 K/UL
NEUTROPHILS NFR BLD AUTO: 47.3 %
NITRITE URINE: NEGATIVE
OPIATES UR-MCNC: NEGATIVE NG/ML
OXYCODONE/OXYMORPHONE, URINE: NEGATIVE NG/ML
PCP UR-MCNC: NEGATIVE NG/ML
PH URINE: 8
PH, URINE: 8
PLATELET # BLD AUTO: 267 K/UL
PROTEIN URINE: NORMAL MG/DL
RBC # BLD: 4 M/UL
RBC # FLD: 13.4 %
SPECIFIC GRAVITY URINE: 1.02
THC UR QL: NEGATIVE NG/ML
UROBILINOGEN URINE: 1 MG/DL
VIT B12 SERPL-MCNC: 439 PG/ML
WBC # FLD AUTO: 5.75 K/UL

## 2025-06-27 PROCEDURE — 99214 OFFICE O/P EST MOD 30 MIN: CPT

## 2025-06-27 RX ORDER — TIZANIDINE 4 MG/1
4 TABLET ORAL
Qty: 21 | Refills: 0 | Status: ACTIVE | COMMUNITY
Start: 2025-06-27 | End: 1900-01-01

## 2025-06-27 RX ORDER — MELOXICAM 15 MG/1
15 TABLET ORAL
Qty: 14 | Refills: 0 | Status: ACTIVE | COMMUNITY
Start: 2025-06-27 | End: 1900-01-01

## 2025-06-30 LAB
ALMOND IGE QN: <0.1 KUA/L
BRAZIL NUT IGE QN: <0.1 KUA/L
C1INH SERPL-MCNC: 34 MG/DL
CASHEW NUT IGE QN: <0.1 KUA/L
CODFISH IGE QN: <0.1 KUA/L
COW MILK IGE QN: <0.1 KUA/L
CYTOLOGY CVX/VAG DOC THIN PREP: NORMAL
DEPRECATED ALMOND IGE RAST QL: 0
DEPRECATED BRAZIL NUT IGE RAST QL: 0
DEPRECATED CASHEW NUT IGE RAST QL: 0
DEPRECATED CODFISH IGE RAST QL: 0
DEPRECATED COW MILK IGE RAST QL: 0
DEPRECATED EGG WHITE IGE RAST QL: 0
DEPRECATED HAZELNUT IGE RAST QL: 0
DEPRECATED PEANUT IGE RAST QL: 0
DEPRECATED SALMON IGE RAST QL: 0
DEPRECATED SESAME SEED IGE RAST QL: 0
DEPRECATED SHRIMP IGE RAST QL: 0
DEPRECATED SOYBEAN IGE RAST QL: 0
DEPRECATED TUNA IGE RAST QL: 0
DEPRECATED WALNUT IGE RAST QL: 0
DEPRECATED WHEAT IGE RAST QL: 0
EGG WHITE IGE QN: <0.1 KUA/L
HAZELNUT IGE QN: <0.1 KUA/L
PEANUT IGE QN: <0.1 KUA/L
SALMON IGE QN: <0.1 KUA/L
SCALLOP IGE QN: 0
SCALLOP IGE QN: <0.1 KUA/L
SESAME SEED IGE QN: <0.1 KUA/L
SHRIMP IGE QN: <0.1 KUA/L
SOYBEAN IGE QN: <0.1 KUA/L
TOTAL IGE SMQN RAST: 24 KU/L
TUNA IGE QN: <0.1 KUA/L
WALNUT IGE QN: <0.1 KUA/L
WHEAT IGE QN: <0.1 KUA/L

## 2025-07-01 ENCOUNTER — NON-APPOINTMENT (OUTPATIENT)
Age: 40
End: 2025-07-01

## 2025-07-01 LAB
PBG UR-MCNC: 0.4 MG/L
PORPHYRINS UR-MCNC: 0.6 MG/24 HR

## 2025-07-02 ENCOUNTER — LABORATORY RESULT (OUTPATIENT)
Age: 40
End: 2025-07-02

## 2025-07-02 ENCOUNTER — APPOINTMENT (OUTPATIENT)
Dept: INTERNAL MEDICINE | Facility: CLINIC | Age: 40
End: 2025-07-02
Payer: MEDICAID

## 2025-07-02 VITALS
OXYGEN SATURATION: 98 % | DIASTOLIC BLOOD PRESSURE: 76 MMHG | RESPIRATION RATE: 16 BRPM | HEIGHT: 62 IN | HEART RATE: 76 BPM | TEMPERATURE: 97.5 F | SYSTOLIC BLOOD PRESSURE: 120 MMHG

## 2025-07-02 PROBLEM — R73.09 ABNORMAL BLOOD SUGAR: Status: ACTIVE | Noted: 2025-07-02

## 2025-07-02 PROBLEM — G90.1 DYSAUTONOMIA: Status: ACTIVE | Noted: 2025-07-02

## 2025-07-02 PROBLEM — R00.0 TACHYCARDIA: Status: ACTIVE | Noted: 2025-07-02

## 2025-07-02 PROCEDURE — 99214 OFFICE O/P EST MOD 30 MIN: CPT

## 2025-07-02 RX ORDER — ACARBOSE 25 MG/1
25 TABLET ORAL
Qty: 190 | Refills: 0 | Status: ACTIVE | COMMUNITY
Start: 2025-07-02 | End: 1900-01-01

## 2025-07-03 ENCOUNTER — RESULT REVIEW (OUTPATIENT)
Age: 40
End: 2025-07-03

## 2025-07-03 ENCOUNTER — APPOINTMENT (OUTPATIENT)
Dept: CARDIOLOGY | Facility: CLINIC | Age: 40
End: 2025-07-03
Payer: MEDICAID

## 2025-07-03 ENCOUNTER — APPOINTMENT (OUTPATIENT)
Dept: MAMMOGRAPHY | Facility: CLINIC | Age: 40
End: 2025-07-03
Payer: MEDICAID

## 2025-07-03 ENCOUNTER — NON-APPOINTMENT (OUTPATIENT)
Age: 40
End: 2025-07-03

## 2025-07-03 ENCOUNTER — APPOINTMENT (OUTPATIENT)
Dept: ULTRASOUND IMAGING | Facility: CLINIC | Age: 40
End: 2025-07-03
Payer: MEDICAID

## 2025-07-03 VITALS
HEIGHT: 62 IN | HEART RATE: 58 BPM | BODY MASS INDEX: 28.52 KG/M2 | WEIGHT: 155 LBS | RESPIRATION RATE: 16 BRPM | OXYGEN SATURATION: 97 %

## 2025-07-03 VITALS — SYSTOLIC BLOOD PRESSURE: 118 MMHG | HEART RATE: 60 BPM | DIASTOLIC BLOOD PRESSURE: 80 MMHG

## 2025-07-03 PROBLEM — R00.2 HEART PALPITATIONS: Status: ACTIVE | Noted: 2025-07-03

## 2025-07-03 PROCEDURE — 93246 EXT ECG>7D<15D RECORDING: CPT

## 2025-07-03 PROCEDURE — 77063 BREAST TOMOSYNTHESIS BI: CPT

## 2025-07-03 PROCEDURE — 77067 SCR MAMMO BI INCL CAD: CPT

## 2025-07-03 PROCEDURE — 93000 ELECTROCARDIOGRAM COMPLETE: CPT | Mod: 59

## 2025-07-03 PROCEDURE — 76641 ULTRASOUND BREAST COMPLETE: CPT | Mod: 50

## 2025-07-03 PROCEDURE — 99204 OFFICE O/P NEW MOD 45 MIN: CPT | Mod: 25

## 2025-07-07 ENCOUNTER — NON-APPOINTMENT (OUTPATIENT)
Age: 40
End: 2025-07-07

## 2025-07-07 RX ORDER — FLUCONAZOLE 150 MG/1
150 TABLET ORAL
Qty: 3 | Refills: 0 | Status: ACTIVE | COMMUNITY
Start: 2025-07-07 | End: 1900-01-01

## 2025-07-08 NOTE — ED ADULT NURSE NOTE - NURSING SKIN WOUND TYPE #1
[Midline] : trachea located in midline position [Normal] : no rashes [Nasal Endoscopy Performed] : nasal endoscopy was performed, see procedure section for findings abcess

## 2025-07-09 ENCOUNTER — NON-APPOINTMENT (OUTPATIENT)
Age: 40
End: 2025-07-09

## 2025-07-10 PROBLEM — Z91.89 AT INCREASED RISK OF BREAST CANCER: Status: ACTIVE | Noted: 2025-07-10

## 2025-07-10 RX ORDER — TERCONAZOLE 8 MG/G
0.8 CREAM VAGINAL
Qty: 1 | Refills: 0 | Status: ACTIVE | COMMUNITY
Start: 2025-07-10 | End: 1900-01-01

## 2025-07-23 ENCOUNTER — NON-APPOINTMENT (OUTPATIENT)
Age: 40
End: 2025-07-23

## 2025-07-29 PROCEDURE — 93248 EXT ECG>7D<15D REV&INTERPJ: CPT

## 2025-08-06 ENCOUNTER — OUTPATIENT (OUTPATIENT)
Dept: OUTPATIENT SERVICES | Facility: HOSPITAL | Age: 40
LOS: 1 days | End: 2025-08-06
Payer: MEDICAID

## 2025-08-06 ENCOUNTER — APPOINTMENT (OUTPATIENT)
Dept: ULTRASOUND IMAGING | Facility: HOSPITAL | Age: 40
End: 2025-08-06
Payer: MEDICAID

## 2025-08-06 DIAGNOSIS — R10.2 PELVIC AND PERINEAL PAIN: ICD-10-CM

## 2025-08-06 DIAGNOSIS — N75.0 CYST OF BARTHOLIN'S GLAND: Chronic | ICD-10-CM

## 2025-08-06 PROCEDURE — 76830 TRANSVAGINAL US NON-OB: CPT

## 2025-08-06 PROCEDURE — 76830 TRANSVAGINAL US NON-OB: CPT | Mod: 26

## 2025-08-12 ENCOUNTER — APPOINTMENT (OUTPATIENT)
Dept: OBGYN | Facility: CLINIC | Age: 40
End: 2025-08-12

## 2025-08-28 ENCOUNTER — NON-APPOINTMENT (OUTPATIENT)
Age: 40
End: 2025-08-28

## 2025-09-03 ENCOUNTER — APPOINTMENT (OUTPATIENT)
Dept: OBGYN | Facility: CLINIC | Age: 40
End: 2025-09-03
Payer: MEDICAID

## 2025-09-03 ENCOUNTER — APPOINTMENT (OUTPATIENT)
Dept: INTERNAL MEDICINE | Facility: CLINIC | Age: 40
End: 2025-09-03
Payer: MEDICAID

## 2025-09-03 VITALS
OXYGEN SATURATION: 95 % | BODY MASS INDEX: 28.34 KG/M2 | DIASTOLIC BLOOD PRESSURE: 72 MMHG | SYSTOLIC BLOOD PRESSURE: 112 MMHG | WEIGHT: 154 LBS | HEIGHT: 62 IN | TEMPERATURE: 97.3 F | HEART RATE: 68 BPM

## 2025-09-03 VITALS
HEART RATE: 75 BPM | RESPIRATION RATE: 14 BRPM | WEIGHT: 154 LBS | TEMPERATURE: 97.3 F | DIASTOLIC BLOOD PRESSURE: 60 MMHG | SYSTOLIC BLOOD PRESSURE: 112 MMHG | OXYGEN SATURATION: 98 % | HEIGHT: 62 IN | BODY MASS INDEX: 28.34 KG/M2

## 2025-09-03 DIAGNOSIS — R05.1 ACUTE COUGH: ICD-10-CM

## 2025-09-03 DIAGNOSIS — R73.9 HYPERGLYCEMIA, UNSPECIFIED: ICD-10-CM

## 2025-09-03 LAB
BILIRUB UR QL STRIP: NEGATIVE
CLARITY UR: CLEAR
COLLECTION METHOD: NORMAL
GLUCOSE UR-MCNC: NEGATIVE
HCG UR QL: 1 EU/DL
HCG UR QL: NEGATIVE
HGB UR QL STRIP.AUTO: NEGATIVE
KETONES UR-MCNC: NORMAL
LEUKOCYTE ESTERASE UR QL STRIP: NEGATIVE
NITRITE UR QL STRIP: NEGATIVE
PH UR STRIP: 7
PROT UR STRIP-MCNC: NORMAL
QUALITY CONTROL: YES
SP GR UR STRIP: 1.02

## 2025-09-03 PROCEDURE — 99213 OFFICE O/P EST LOW 20 MIN: CPT

## 2025-09-03 PROCEDURE — 99459 PELVIC EXAMINATION: CPT

## 2025-09-03 PROCEDURE — 99214 OFFICE O/P EST MOD 30 MIN: CPT

## 2025-09-03 RX ORDER — ACARBOSE 25 MG/1
25 TABLET ORAL 3 TIMES DAILY
Qty: 270 | Refills: 0 | Status: ACTIVE | COMMUNITY
Start: 2025-09-03 | End: 1900-01-01

## 2025-09-03 RX ORDER — FLUCONAZOLE 150 MG/1
150 TABLET ORAL
Qty: 3 | Refills: 0 | Status: ACTIVE | COMMUNITY
Start: 2025-09-03 | End: 1900-01-01

## 2025-09-04 LAB
CANDIDA VAG CYTO: DETECTED
G VAGINALIS+PREV SP MTYP VAG QL MICRO: NOT DETECTED
T VAGINALIS VAG QL WET PREP: NOT DETECTED

## 2025-09-05 LAB — BACTERIA UR CULT: NORMAL

## 2025-09-11 ENCOUNTER — NON-APPOINTMENT (OUTPATIENT)
Age: 40
End: 2025-09-11

## (undated) DEVICE — DRAPE LIGHT HANDLE COVER (GREEN)

## (undated) DEVICE — SUT MONOSOF 4-0 18" C-13

## (undated) DEVICE — TUBING SET GRAVITY 4 SPIKE

## (undated) DEVICE — SUT POLYSORB 0 30" GS-10 UNDYED

## (undated) DEVICE — ELCTR BOVIE PENCIL BLADE 10FT

## (undated) DEVICE — SUT FIBERSTICK SIZE 2 50" BLUE

## (undated) DEVICE — PACK KNEE ARTHROSCOPY

## (undated) DEVICE — LAP PAD 18 X 18"

## (undated) DEVICE — BLADE SCALPEL SAFETYLOCK #15

## (undated) DEVICE — POSITIONER STIRRUP STRAP W SLIP RING 19X3.5"

## (undated) DEVICE — SUT ORTHOCORD 2 36" MO-6

## (undated) DEVICE — SUT SURGIPRO 0 30" GS-22

## (undated) DEVICE — DRAPE TOWEL BLUE 17" X 24"

## (undated) DEVICE — TUBING SUCTION 20FT

## (undated) DEVICE — DVC SUCTION FLR PUDDLE GUPPY

## (undated) DEVICE — POSITIONER STRAP ARMBOARD VELCRO TS-30

## (undated) DEVICE — SPLINT IMMOBILIZER 3-PANEL KNEE 20"

## (undated) DEVICE — S&N FASTFIX 360 STRAIGHT KNOT PUSHER SET

## (undated) DEVICE — SUT FIBERWIRE #2 38" STRAND 1 BLUE T-5 TAPER

## (undated) DEVICE — SYR ASEPTO

## (undated) DEVICE — ELCTR BOVIE TIP BLADE INSULATED 3" EDGE

## (undated) DEVICE — DRAPE INSTRUMENT POUCH 6.75" X 11"

## (undated) DEVICE — SYR LUER LOK 10CC

## (undated) DEVICE — VENODYNE/SCD SLEEVE CALF MEDIUM

## (undated) DEVICE — SUT PROLENE 3-0 36" RB-1

## (undated) DEVICE — SUT TAPE TIGERLOOP W NDL WHITE / BLACK

## (undated) DEVICE — SUT WIRE # 2 TIGERLOOP

## (undated) DEVICE — SUT POLYSORB 2-0 30" C-15 UNDYED

## (undated) DEVICE — DRSG COBAN 6"

## (undated) DEVICE — TOURNIQUET ESMARK 6"

## (undated) DEVICE — SHAVER BLADE LINVATEC ULTRAFRR 3.5MM

## (undated) DEVICE — DRSG WEBRIL 6"

## (undated) DEVICE — S&N FASTFIX 360 CURVED KNOT PUSHER SET

## (undated) DEVICE — SUCTION YANKAUER NO CONTROL VENT

## (undated) DEVICE — PACK BASIC TIBURON LTXF STRL

## (undated) DEVICE — CONTAINER SPECIMEN 4OZ

## (undated) DEVICE — DRAPE 3/4 SHEET 52X76"

## (undated) DEVICE — ARTHREX KIT ACL TRANSTIBIAL WITHOUT SAW BLADE

## (undated) DEVICE — TOURNIQUET CUFF 34" DUAL PORT W PLC

## (undated) DEVICE — BLADE SCALPEL SAFETYLOCK #10

## (undated) DEVICE — GLV 8 PROTEXIS (BLUE)

## (undated) DEVICE — SAW BLADE MICROAIRE SAGITTAL 9.4MMX25.4MMX0.6MM

## (undated) DEVICE — WARMING BLANKET UPPER ADULT

## (undated) DEVICE — NDL SPINAL 18G X 3.5" (PINK)

## (undated) DEVICE — SOL IRR BAG NS 0.9% 3000ML

## (undated) DEVICE — S&N ARTHROCARE WAND COBLATION WEREWOLF FLOW 90

## (undated) DEVICE — GLV 8 PROTEXIS (WHITE)